# Patient Record
Sex: MALE | Race: WHITE | NOT HISPANIC OR LATINO | Employment: OTHER | URBAN - NONMETROPOLITAN AREA
[De-identification: names, ages, dates, MRNs, and addresses within clinical notes are randomized per-mention and may not be internally consistent; named-entity substitution may affect disease eponyms.]

---

## 2017-01-09 RX ORDER — PRAVASTATIN SODIUM 40 MG
TABLET ORAL
Qty: 90 TABLET | Refills: 2 | Status: SHIPPED | OUTPATIENT
Start: 2017-01-09 | End: 2017-08-07 | Stop reason: SDUPTHER

## 2017-04-17 RX ORDER — LEVOTHYROXINE SODIUM 0.07 MG/1
TABLET ORAL
Qty: 90 TABLET | Refills: 3 | Status: SHIPPED | OUTPATIENT
Start: 2017-04-17 | End: 2018-02-09 | Stop reason: SDUPTHER

## 2017-04-17 RX ORDER — OMEPRAZOLE 20 MG/1
CAPSULE, DELAYED RELEASE ORAL
Qty: 90 CAPSULE | Refills: 2 | Status: SHIPPED | OUTPATIENT
Start: 2017-04-17 | End: 2018-02-09 | Stop reason: SDUPTHER

## 2017-04-26 DIAGNOSIS — E03.9 ACQUIRED HYPOTHYROIDISM: ICD-10-CM

## 2017-04-26 DIAGNOSIS — I10 ESSENTIAL HYPERTENSION: ICD-10-CM

## 2017-04-26 DIAGNOSIS — E11.8 TYPE 2 DIABETES MELLITUS WITH COMPLICATION, WITHOUT LONG-TERM CURRENT USE OF INSULIN (HCC): ICD-10-CM

## 2017-04-26 DIAGNOSIS — I48.20 CHRONIC ATRIAL FIBRILLATION (HCC): Primary | ICD-10-CM

## 2017-04-26 DIAGNOSIS — E78.2 MIXED HYPERLIPIDEMIA: ICD-10-CM

## 2017-06-06 ENCOUNTER — LAB (OUTPATIENT)
Dept: LAB | Facility: OTHER | Age: 77
End: 2017-06-06

## 2017-06-06 ENCOUNTER — OFFICE VISIT (OUTPATIENT)
Dept: FAMILY MEDICINE CLINIC | Facility: CLINIC | Age: 77
End: 2017-06-06

## 2017-06-06 VITALS
HEART RATE: 70 BPM | SYSTOLIC BLOOD PRESSURE: 105 MMHG | WEIGHT: 177.3 LBS | BODY MASS INDEX: 26.26 KG/M2 | DIASTOLIC BLOOD PRESSURE: 65 MMHG | OXYGEN SATURATION: 98 % | HEIGHT: 69 IN

## 2017-06-06 DIAGNOSIS — I48.20 CHRONIC ATRIAL FIBRILLATION (HCC): Chronic | ICD-10-CM

## 2017-06-06 DIAGNOSIS — E03.9 ACQUIRED HYPOTHYROIDISM: ICD-10-CM

## 2017-06-06 DIAGNOSIS — I10 ESSENTIAL HYPERTENSION: Chronic | ICD-10-CM

## 2017-06-06 DIAGNOSIS — E11.8 TYPE 2 DIABETES MELLITUS WITH COMPLICATION, WITHOUT LONG-TERM CURRENT USE OF INSULIN (HCC): Primary | Chronic | ICD-10-CM

## 2017-06-06 DIAGNOSIS — E78.2 MIXED HYPERLIPIDEMIA: Chronic | ICD-10-CM

## 2017-06-06 DIAGNOSIS — I48.20 CHRONIC ATRIAL FIBRILLATION (HCC): ICD-10-CM

## 2017-06-06 DIAGNOSIS — I10 ESSENTIAL HYPERTENSION: ICD-10-CM

## 2017-06-06 DIAGNOSIS — E11.8 TYPE 2 DIABETES MELLITUS WITH COMPLICATION, WITHOUT LONG-TERM CURRENT USE OF INSULIN (HCC): ICD-10-CM

## 2017-06-06 DIAGNOSIS — N28.9 RENAL INSUFFICIENCY: ICD-10-CM

## 2017-06-06 DIAGNOSIS — E78.2 MIXED HYPERLIPIDEMIA: ICD-10-CM

## 2017-06-06 DIAGNOSIS — T46.0X1A DIGOXIN TOXICITY, ACCIDENTAL OR UNINTENTIONAL, INITIAL ENCOUNTER: ICD-10-CM

## 2017-06-06 LAB
ALBUMIN SERPL-MCNC: 3.8 G/DL (ref 3.2–5.5)
ALBUMIN/GLOB SERPL: 0.8 G/DL (ref 1–3)
ALP SERPL-CCNC: 66 U/L (ref 15–121)
ALT SERPL W P-5'-P-CCNC: 65 U/L (ref 10–60)
ANION GAP SERPL CALCULATED.3IONS-SCNC: 11 MMOL/L (ref 5–15)
ARTICHOKE IGE QN: 87 MG/DL (ref 0–129)
AST SERPL-CCNC: 71 U/L (ref 10–60)
BASOPHILS # BLD AUTO: 0.01 10*3/MM3 (ref 0–0.2)
BASOPHILS NFR BLD AUTO: 0.1 % (ref 0–2)
BILIRUB SERPL-MCNC: 0.5 MG/DL (ref 0.2–1)
BILIRUB UR QL STRIP: NEGATIVE
BUN BLD-MCNC: 51 MG/DL (ref 8–25)
BUN/CREAT SERPL: 25.5 (ref 7–25)
CALCIUM SPEC-SCNC: 8.9 MG/DL (ref 8.4–10.8)
CHLORIDE SERPL-SCNC: 101 MMOL/L (ref 100–112)
CLARITY UR: CLEAR
CO2 SERPL-SCNC: 28 MMOL/L (ref 20–32)
COLOR UR: YELLOW
CREAT BLD-MCNC: 2 MG/DL (ref 0.4–1.3)
DEPRECATED RDW RBC AUTO: 51.4 FL (ref 35.1–43.9)
EOSINOPHIL # BLD AUTO: 0.2 10*3/MM3 (ref 0–0.7)
EOSINOPHIL NFR BLD AUTO: 3 % (ref 0–7)
ERYTHROCYTE [DISTWIDTH] IN BLOOD BY AUTOMATED COUNT: 16.5 % (ref 11.5–14.5)
GFR SERPL CREATININE-BSD FRML MDRD: 33 ML/MIN/1.73 (ref 42–98)
GLOBULIN UR ELPH-MCNC: 4.7 GM/DL (ref 2.5–4.6)
GLUCOSE BLD-MCNC: 177 MG/DL (ref 70–100)
GLUCOSE UR STRIP-MCNC: NEGATIVE MG/DL
HCT VFR BLD AUTO: 40 % (ref 39–49)
HGB BLD-MCNC: 12.2 G/DL (ref 13.7–17.3)
HGB UR QL STRIP.AUTO: NEGATIVE
KETONES UR QL STRIP: ABNORMAL
LEUKOCYTE ESTERASE UR QL STRIP.AUTO: NEGATIVE
LYMPHOCYTES # BLD AUTO: 1.33 10*3/MM3 (ref 0.6–4.2)
LYMPHOCYTES NFR BLD AUTO: 19.9 % (ref 10–50)
MCH RBC QN AUTO: 26.2 PG (ref 26.5–34)
MCHC RBC AUTO-ENTMCNC: 30.5 G/DL (ref 31.5–36.3)
MCV RBC AUTO: 85.8 FL (ref 80–98)
MONOCYTES # BLD AUTO: 0.67 10*3/MM3 (ref 0–0.9)
MONOCYTES NFR BLD AUTO: 10 % (ref 0–12)
NEUTROPHILS # BLD AUTO: 4.48 10*3/MM3 (ref 2–8.6)
NEUTROPHILS NFR BLD AUTO: 67 % (ref 37–80)
NITRITE UR QL STRIP: NEGATIVE
PH UR STRIP.AUTO: 5.5 [PH] (ref 5.5–8)
PLATELET # BLD AUTO: 167 10*3/MM3 (ref 150–450)
PMV BLD AUTO: 11 FL (ref 8–12)
POTASSIUM BLD-SCNC: 5.2 MMOL/L (ref 3.4–5.4)
PROT SERPL-MCNC: 8.5 G/DL (ref 6.7–8.2)
PROT UR QL STRIP: ABNORMAL
RBC # BLD AUTO: 4.66 10*6/MM3 (ref 4.37–5.74)
SODIUM BLD-SCNC: 140 MMOL/L (ref 134–146)
SP GR UR STRIP: 1.01 (ref 1–1.03)
UROBILINOGEN UR QL STRIP: ABNORMAL
WBC NRBC COR # BLD: 6.69 10*3/MM3 (ref 3.2–9.8)

## 2017-06-06 PROCEDURE — 81003 URINALYSIS AUTO W/O SCOPE: CPT | Performed by: GENERAL PRACTICE

## 2017-06-06 PROCEDURE — 80053 COMPREHEN METABOLIC PANEL: CPT | Performed by: GENERAL PRACTICE

## 2017-06-06 PROCEDURE — 36415 COLL VENOUS BLD VENIPUNCTURE: CPT | Performed by: GENERAL PRACTICE

## 2017-06-06 PROCEDURE — 84443 ASSAY THYROID STIM HORMONE: CPT | Performed by: GENERAL PRACTICE

## 2017-06-06 PROCEDURE — 83036 HEMOGLOBIN GLYCOSYLATED A1C: CPT | Performed by: GENERAL PRACTICE

## 2017-06-06 PROCEDURE — 82043 UR ALBUMIN QUANTITATIVE: CPT | Performed by: GENERAL PRACTICE

## 2017-06-06 PROCEDURE — 85025 COMPLETE CBC W/AUTO DIFF WBC: CPT | Performed by: GENERAL PRACTICE

## 2017-06-06 PROCEDURE — 84439 ASSAY OF FREE THYROXINE: CPT | Performed by: GENERAL PRACTICE

## 2017-06-06 PROCEDURE — 80162 ASSAY OF DIGOXIN TOTAL: CPT | Performed by: GENERAL PRACTICE

## 2017-06-06 PROCEDURE — 83721 ASSAY OF BLOOD LIPOPROTEIN: CPT | Performed by: GENERAL PRACTICE

## 2017-06-06 PROCEDURE — 99214 OFFICE O/P EST MOD 30 MIN: CPT | Performed by: GENERAL PRACTICE

## 2017-06-06 RX ORDER — UBIDECARENONE 75 MG
50 CAPSULE ORAL DAILY
COMMUNITY

## 2017-06-06 RX ORDER — FUROSEMIDE 40 MG/1
40 TABLET ORAL 2 TIMES DAILY
COMMUNITY
End: 2018-02-12 | Stop reason: SDUPTHER

## 2017-06-06 NOTE — PROGRESS NOTES
Subjective   Laci Pruett is a 77 y.o. male.   Chief Complaint   Patient presents with   • Follow-up     wanting to reduce some of his meds if he can   • Hypertension   • Hyperlipidemia   • Diabetes     wanting diabetic shoes     For review and evaluation of management of chronic medical problems. Labs reviewed. Recent hospitalization for dehydration and renal disease.   Hypertension   This is a chronic problem. The current episode started more than 1 year ago. The problem has been gradually improving since onset. The problem is controlled. Associated symptoms include anxiety. Pertinent negatives include no chest pain, headaches, neck pain, palpitations or shortness of breath. There are no associated agents to hypertension. Risk factors for coronary artery disease include diabetes mellitus, dyslipidemia, male gender and stress. Past treatments include calcium channel blockers and ACE inhibitors. The current treatment provides significant improvement. There are no compliance problems.  Hypertensive end-organ damage includes heart failure. Identifiable causes of hypertension include chronic renal disease.   Hyperlipidemia   This is a chronic problem. The current episode started more than 1 year ago. The problem is controlled. Recent lipid tests were reviewed and are normal. Exacerbating diseases include chronic renal disease, diabetes and hypothyroidism. There are no known factors aggravating his hyperlipidemia. Pertinent negatives include no chest pain, myalgias or shortness of breath. Current antihyperlipidemic treatment includes statins. The current treatment provides significant improvement of lipids. There are no compliance problems.    Diabetes   He presents for his follow-up diabetic visit. He has type 2 diabetes mellitus. His disease course has been stable. Pertinent negatives for hypoglycemia include no dizziness, headaches or nervousness/anxiousness. There are no diabetic associated symptoms. Pertinent  negatives for diabetes include no chest pain, no fatigue and no weakness. There are no hypoglycemic complications. Symptoms are stable. Diabetic complications include nephropathy. Risk factors for coronary artery disease include diabetes mellitus, dyslipidemia and hypertension. Current diabetic treatment includes oral agent (monotherapy). He is compliant with treatment most of the time. His weight is stable. He is following a generally healthy diet. When asked about meal planning, he reported none. He has not had a previous visit with a dietitian. He never participates in exercise. Home blood sugar record trend: does not check regularly. An ACE inhibitor/angiotensin II receptor blocker is being taken. Eye exam is current (Dr. Anderson).      The following portions of the patient's history were reviewed and updated as appropriate: allergies, current medications, past social history and problem list.    Current Outpatient Prescriptions:   •  ACCU-CHEK FASTCLIX LANCETS misc, , Disp: , Rfl:   •  ACCU-CHEK SMARTVIEW test strip, , Disp: , Rfl:   •  Alcohol Swabs (B-D SINGLE USE SWABS REGULAR) pads, USE AS DIRECTED ONE TIME DAILY FOR INSULIN INJECTIONS AND HOME GLUCOSE TESTING , Disp: 100 each, Rfl: 2  •  amiodarone (PACERONE) 200 MG tablet, Take 200 mg by mouth daily., Disp: , Rfl:   •  aspirin 81 MG tablet, Take 81 mg by mouth daily., Disp: , Rfl:   •  Blood Glucose Calibration (ACCU-CHEK SMARTVIEW CONTROL) liquid, USE  FOR  TESTING  GLUCOMETER, Disp: 1 each, Rfl: 3  •  digoxin (LANOXIN) 250 MCG tablet, 0.5 tablet(s) by mouth daily, Disp: , Rfl:   •  furosemide (LASIX) 40 MG tablet, Take 40 mg by mouth 2 (Two) Times a Day., Disp: , Rfl:   •  ibuprofen (ADVIL,MOTRIN) 200 MG tablet, Take 200 mg by mouth as needed., Disp: , Rfl:   •  isosorbide mononitrate (ISMO,MONOKET) 20 MG tablet, TAKE 1 TABLET TWICE DAILY  7  HOURS  APART, Disp: 180 tablet, Rfl: 2  •  levothyroxine (SYNTHROID, LEVOTHROID) 75 MCG tablet, TAKE 1 TABLET  EVERY DAY, Disp: 90 tablet, Rfl: 3  •  lisinopril (PRINIVIL,ZESTRIL) 5 MG tablet, Take 5 mg by mouth daily., Disp: , Rfl:   •  nitroglycerin (NITROSTAT) 0.4 MG SL tablet, Place 0.4 mg under the tongue as needed for chest pain. Take no more than 3 doses in 15 minutes., Disp: , Rfl:   •  omeprazole (priLOSEC) 20 MG capsule, TAKE 1 CAPSULE EVERY DAY FOR STOMACH, Disp: 90 capsule, Rfl: 2  •  pravastatin (PRAVACHOL) 40 MG tablet, TAKE 1 TABLET AT BEDTIME, Disp: 90 tablet, Rfl: 2  •  sacubitril-valsartan (ENTRESTO) 24-26 MG tablet, Take 1 tablet by mouth 2 (Two) Times a Day., Disp: , Rfl:   •  sertraline (ZOLOFT) 50 MG tablet, Take  by mouth daily., Disp: , Rfl:   •  spironolactone (ALDACTONE) 25 MG tablet, Take 25 mg by mouth daily., Disp: , Rfl:   •  traZODone (DESYREL) 50 MG tablet, Take 1 tablet by mouth Every Night., Disp: 30 tablet, Rfl: 5  •  vitamin B-12 (CYANOCOBALAMIN) 100 MCG tablet, Take 50 mcg by mouth Daily., Disp: , Rfl:   •  warfarin (COUMADIN) 3 MG tablet, Take 3 mg by mouth daily., Disp: , Rfl:   •  BESIVANCE 0.6 % suspension ophthalmic suspension, , Disp: , Rfl:   •  carvedilol (COREG) 12.5 MG tablet, Take 1 tablet by mouth 2 (Two) Times a Day With Meals., Disp: 30 tablet, Rfl: 0  •  DUREZOL 0.05 % ophthalmic emulsion, , Disp: , Rfl:   •  glimepiride (AMARYL) 4 MG tablet, Take 1 tablet by mouth Every Morning Before Breakfast. For diabetes, Disp: 90 tablet, Rfl: 3    Review of Systems   Constitutional: Negative.  Negative for activity change, appetite change, chills, fatigue, fever and unexpected weight change.   HENT: Negative.  Negative for congestion, ear pain, hearing loss, nosebleeds, rhinorrhea, sinus pressure, sneezing, sore throat, tinnitus and trouble swallowing.    Eyes: Negative.  Negative for pain, discharge, redness, itching and visual disturbance.   Respiratory: Negative.  Negative for apnea, cough, chest tightness, shortness of breath and wheezing.    Cardiovascular: Negative.  Negative  "for chest pain, palpitations and leg swelling.   Gastrointestinal: Negative.  Negative for abdominal distention, abdominal pain, constipation, diarrhea, nausea and vomiting.   Endocrine: Negative.    Genitourinary: Negative.  Negative for dysuria, frequency and urgency.   Musculoskeletal: Negative.  Negative for arthralgias, back pain, gait problem, joint swelling, myalgias, neck pain and neck stiffness.   Skin: Negative.  Negative for color change and rash.   Allergic/Immunologic: Negative.    Neurological: Negative.  Negative for dizziness, weakness, light-headedness, numbness and headaches.   Hematological: Negative.  Negative for adenopathy.   Psychiatric/Behavioral: Negative.  Negative for dysphoric mood and sleep disturbance. The patient is not nervous/anxious.      Objective   Visit Vitals   • /65 (BP Location: Left arm, Patient Position: Sitting, Cuff Size: Adult)   • Pulse 70   • Ht 69\" (175.3 cm)   • Wt 177 lb 4.8 oz (80.4 kg)   • SpO2 98%   • BMI 26.18 kg/m2     Physical Exam   Constitutional: He is oriented to person, place, and time. He appears well-developed and well-nourished. No distress.   HENT:   Head: Normocephalic.   Nose: Nose normal.   Mouth/Throat: Oropharynx is clear and moist.   Eyes: Conjunctivae and EOM are normal. Pupils are equal, round, and reactive to light. Right eye exhibits no discharge. Left eye exhibits no discharge.   Neck: No thyromegaly present.   Cardiovascular: Normal rate, regular rhythm, normal heart sounds and intact distal pulses.    No murmur heard.  Pulmonary/Chest: Effort normal and breath sounds normal.   Musculoskeletal: He exhibits no edema.    Laci had a diabetic foot exam performed today.    Vascular Status -  His exam exhibits right foot vasculature normal. His exam exhibits no right foot edema. His exam exhibits left foot vasculature normal. His exam exhibits no left foot edema.   Skin Integrity  -  His right foot skin is intact.     Laci 's left foot " skin is intact. .     Lymphadenopathy:     He has no cervical adenopathy.   Neurological: He is alert and oriented to person, place, and time.   Skin: Skin is warm and dry.   Psychiatric: He has a normal mood and affect.   Nursing note and vitals reviewed.    Assessment/Plan   Problem List Items Addressed This Visit        Cardiovascular and Mediastinum    Chronic atrial fibrillation    Relevant Medications    sacubitril-valsartan (ENTRESTO) 24-26 MG tablet    Other Relevant Orders    Digoxin level    Essential hypertension    Relevant Medications    furosemide (LASIX) 40 MG tablet    Hyperlipidemia    Relevant Orders    Lipid Panel       Endocrine    Type 2 diabetes mellitus - Primary (Chronic)    Relevant Orders    Hemoglobin A1c    Comprehensive Metabolic Panel    Acquired hypothyroidism    Relevant Orders    TSH    T4, Free      Other Visit Diagnoses     Digoxin toxicity, accidental or unintentional, initial encounter        Relevant Orders    Digoxin level    Renal insufficiency        Relevant Medications    furosemide (LASIX) 40 MG tablet    Other Relevant Orders    Ambulatory Referral to Nephrology        Results for orders placed or performed in visit on 06/06/17   MicroAlbumin, Urine, Random   Result Value Ref Range    Microalbumin, Urine 2.2 mg/L   Urinalysis With / Microscopic If Indicated   Result Value Ref Range    Color, UA Yellow Yellow, Straw    Appearance, UA Clear Clear    pH, UA 5.5 5.5 - 8.0    Specific Gravity, UA 1.015 1.005 - 1.030    Glucose, UA Negative Negative    Ketones, UA Trace (A) Negative    Bilirubin, UA Negative Negative    Blood, UA Negative Negative    Protein, UA Trace (A) Negative    Leuk Esterase, UA Negative Negative    Nitrite, UA Negative Negative    Urobilinogen, UA 1.0 E.U./dL 0.2 - 1.0 E.U./dL   Hemoglobin A1c   Result Value Ref Range    Hemoglobin A1C 8.48 (H) 4 - 5.6 %   Comprehensive Metabolic Panel   Result Value Ref Range    Glucose 177 (H) 70 - 100 mg/dL    BUN 51  (H) 8 - 25 mg/dL    Creatinine 2.00 (H) 0.40 - 1.30 mg/dL    Sodium 140 134 - 146 mmol/L    Potassium 5.2 3.4 - 5.4 mmol/L    Chloride 101 100 - 112 mmol/L    CO2 28.0 20.0 - 32.0 mmol/L    Calcium 8.9 8.4 - 10.8 mg/dL    Total Protein 8.5 (H) 6.7 - 8.2 g/dL    Albumin 3.80 3.20 - 5.50 g/dL    ALT (SGPT) 65 (H) 10 - 60 U/L    AST (SGOT) 71 (H) 10 - 60 U/L    Alkaline Phosphatase 66 15 - 121 U/L    Total Bilirubin 0.5 0.2 - 1.0 mg/dL    eGFR Non  Amer 33 (L) 42 - 98 mL/min/1.73    Globulin 4.7 (H) 2.5 - 4.6 gm/dL    A/G Ratio 0.8 (L) 1.0 - 3.0 g/dL    BUN/Creatinine Ratio 25.5 (H) 7.0 - 25.0    Anion Gap 11.0 5.0 - 15.0 mmol/L   CBC Auto Differential   Result Value Ref Range    WBC 6.69 3.20 - 9.80 10*3/mm3    RBC 4.66 4.37 - 5.74 10*6/mm3    Hemoglobin 12.2 (L) 13.7 - 17.3 g/dL    Hematocrit 40.0 39.0 - 49.0 %    MCV 85.8 80.0 - 98.0 fL    MCH 26.2 (L) 26.5 - 34.0 pg    MCHC 30.5 (L) 31.5 - 36.3 g/dL    RDW 16.5 (H) 11.5 - 14.5 %    RDW-SD 51.4 (H) 35.1 - 43.9 fl    MPV 11.0 8.0 - 12.0 fL    Platelets 167 150 - 450 10*3/mm3    Neutrophil % 67.0 37.0 - 80.0 %    Lymphocyte % 19.9 10.0 - 50.0 %    Monocyte % 10.0 0.0 - 12.0 %    Eosinophil % 3.0 0.0 - 7.0 %    Basophil % 0.1 0.0 - 2.0 %    Neutrophils, Absolute 4.48 2.00 - 8.60 10*3/mm3    Lymphocytes, Absolute 1.33 0.60 - 4.20 10*3/mm3    Monocytes, Absolute 0.67 0.00 - 0.90 10*3/mm3    Eosinophils, Absolute 0.20 0.00 - 0.70 10*3/mm3    Basophils, Absolute 0.01 0.00 - 0.20 10*3/mm3   TSH   Result Value Ref Range    TSH 6.340 (H) 0.460 - 4.680 mIU/mL   T4, Free   Result Value Ref Range    Free T4 1.48 0.78 - 2.19 ng/dL   Digoxin level   Result Value Ref Range    Digoxin 2.70 (C) 0.80 - 2.00 ng/mL   LDL Cholesterol, Direct   Result Value Ref Range    LDL Cholesterol  87 0 - 129 mg/dL      Hold digoxin for 3 days then start on 1/2 tab daily. Make sure he is taking his levothyroxine daily. Home health nurse will check on these for us.     New Medications Ordered  This Visit   Medications   • vitamin B-12 (CYANOCOBALAMIN) 100 MCG tablet     Sig: Take 50 mcg by mouth Daily.   • furosemide (LASIX) 40 MG tablet     Sig: Take 40 mg by mouth 2 (Two) Times a Day.   • sacubitril-valsartan (ENTRESTO) 24-26 MG tablet     Sig: Take 1 tablet by mouth 2 (Two) Times a Day.     Return in about 3 months (around 9/6/2017) for medicare wellness visit.

## 2017-06-07 LAB
ALBUMIN UR-MCNC: 2.2 MG/L
DIGOXIN SERPL-MCNC: 2.7 NG/ML (ref 0.8–2)
HBA1C MFR BLD: 8.48 % (ref 4–5.6)
T4 FREE SERPL-MCNC: 1.48 NG/DL (ref 0.78–2.19)
TSH SERPL DL<=0.05 MIU/L-ACNC: 6.34 MIU/ML (ref 0.46–4.68)

## 2017-06-07 RX ORDER — GLIMEPIRIDE 4 MG/1
4 TABLET ORAL
Qty: 90 TABLET | Refills: 3 | Status: SHIPPED | OUTPATIENT
Start: 2017-06-07 | End: 2018-02-12 | Stop reason: SDUPTHER

## 2017-06-09 ENCOUNTER — TELEPHONE (OUTPATIENT)
Dept: FAMILY MEDICINE CLINIC | Facility: CLINIC | Age: 77
End: 2017-06-09

## 2017-06-09 RX ORDER — CARVEDILOL 12.5 MG/1
TABLET ORAL
Qty: 180 TABLET | Refills: 2 | Status: SHIPPED | OUTPATIENT
Start: 2017-06-09 | End: 2017-06-09 | Stop reason: SDUPTHER

## 2017-06-09 RX ORDER — CARVEDILOL 12.5 MG/1
12.5 TABLET ORAL 2 TIMES DAILY WITH MEALS
Qty: 30 TABLET | Refills: 0 | Status: SHIPPED | OUTPATIENT
Start: 2017-06-09 | End: 2018-01-05 | Stop reason: SDUPTHER

## 2017-07-05 RX ORDER — SPIRONOLACTONE 25 MG/1
TABLET ORAL
Qty: 45 TABLET | Refills: 3 | Status: SHIPPED | OUTPATIENT
Start: 2017-07-05 | End: 2018-02-12 | Stop reason: SDUPTHER

## 2017-07-06 ENCOUNTER — LAB (OUTPATIENT)
Dept: LAB | Facility: OTHER | Age: 77
End: 2017-07-06

## 2017-07-06 ENCOUNTER — TRANSCRIBE ORDERS (OUTPATIENT)
Dept: LAB | Facility: OTHER | Age: 77
End: 2017-07-06

## 2017-07-06 DIAGNOSIS — I48.91 ATRIAL FIBRILLATION, UNSPECIFIED TYPE (HCC): Primary | ICD-10-CM

## 2017-07-06 DIAGNOSIS — Z79.899 DRUG THERAPY: ICD-10-CM

## 2017-07-06 DIAGNOSIS — I48.91 ATRIAL FIBRILLATION, UNSPECIFIED TYPE (HCC): ICD-10-CM

## 2017-07-06 DIAGNOSIS — N18.30 CHRONIC RENAL DISEASE, STAGE III (HCC): ICD-10-CM

## 2017-07-06 DIAGNOSIS — N18.30 CHRONIC RENAL DISEASE, STAGE III (HCC): Primary | ICD-10-CM

## 2017-07-06 LAB
ALBUMIN SERPL-MCNC: 3.5 G/DL (ref 3.2–5.5)
ANION GAP SERPL CALCULATED.3IONS-SCNC: 12 MMOL/L (ref 5–15)
BASOPHILS # BLD AUTO: 0.01 10*3/MM3 (ref 0–0.2)
BASOPHILS NFR BLD AUTO: 0.2 % (ref 0–2)
BUN BLD-MCNC: 35 MG/DL (ref 8–25)
BUN/CREAT SERPL: 18.4 (ref 7–25)
CALCIUM SPEC-SCNC: 8.6 MG/DL (ref 8.4–10.8)
CHLORIDE SERPL-SCNC: 101 MMOL/L (ref 100–112)
CO2 SERPL-SCNC: 31 MMOL/L (ref 20–32)
CREAT BLD-MCNC: 1.9 MG/DL (ref 0.4–1.3)
DEPRECATED RDW RBC AUTO: 56.7 FL (ref 35.1–43.9)
EOSINOPHIL # BLD AUTO: 0.16 10*3/MM3 (ref 0–0.7)
EOSINOPHIL NFR BLD AUTO: 3.7 % (ref 0–7)
ERYTHROCYTE [DISTWIDTH] IN BLOOD BY AUTOMATED COUNT: 18.4 % (ref 11.5–14.5)
GFR SERPL CREATININE-BSD FRML MDRD: 35 ML/MIN/1.73 (ref 42–98)
GLUCOSE BLD-MCNC: 150 MG/DL (ref 70–100)
HCT VFR BLD AUTO: 37.6 % (ref 39–49)
HGB BLD-MCNC: 11.8 G/DL (ref 13.7–17.3)
INR PPP: 1.61 (ref 0.8–1.2)
LYMPHOCYTES # BLD AUTO: 1.32 10*3/MM3 (ref 0.6–4.2)
LYMPHOCYTES NFR BLD AUTO: 30.6 % (ref 10–50)
MCH RBC QN AUTO: 27.1 PG (ref 26.5–34)
MCHC RBC AUTO-ENTMCNC: 31.4 G/DL (ref 31.5–36.3)
MCV RBC AUTO: 86.2 FL (ref 80–98)
MONOCYTES # BLD AUTO: 0.46 10*3/MM3 (ref 0–0.9)
MONOCYTES NFR BLD AUTO: 10.7 % (ref 0–12)
NEUTROPHILS # BLD AUTO: 2.36 10*3/MM3 (ref 2–8.6)
NEUTROPHILS NFR BLD AUTO: 54.8 % (ref 37–80)
PHOSPHATE SERPL-MCNC: 3.3 MG/DL (ref 2.5–4.6)
PLATELET # BLD AUTO: 105 10*3/MM3 (ref 150–450)
PMV BLD AUTO: 11.5 FL (ref 8–12)
POTASSIUM BLD-SCNC: 4.8 MMOL/L (ref 3.4–5.4)
PROTHROMBIN TIME: 19.2 SECONDS (ref 11.1–15.3)
RBC # BLD AUTO: 4.36 10*6/MM3 (ref 4.37–5.74)
SODIUM BLD-SCNC: 144 MMOL/L (ref 134–146)
WBC NRBC COR # BLD: 4.31 10*3/MM3 (ref 3.2–9.8)

## 2017-07-06 PROCEDURE — 84156 ASSAY OF PROTEIN URINE: CPT | Performed by: INTERNAL MEDICINE

## 2017-07-06 PROCEDURE — 80069 RENAL FUNCTION PANEL: CPT | Performed by: INTERNAL MEDICINE

## 2017-07-06 PROCEDURE — 85610 PROTHROMBIN TIME: CPT | Performed by: INTERNAL MEDICINE

## 2017-07-06 PROCEDURE — 36416 COLLJ CAPILLARY BLOOD SPEC: CPT | Performed by: INTERNAL MEDICINE

## 2017-07-06 PROCEDURE — 82570 ASSAY OF URINE CREATININE: CPT | Performed by: INTERNAL MEDICINE

## 2017-07-06 PROCEDURE — 80162 ASSAY OF DIGOXIN TOTAL: CPT | Performed by: INTERNAL MEDICINE

## 2017-07-06 PROCEDURE — 85025 COMPLETE CBC W/AUTO DIFF WBC: CPT | Performed by: INTERNAL MEDICINE

## 2017-07-06 PROCEDURE — 36415 COLL VENOUS BLD VENIPUNCTURE: CPT | Performed by: INTERNAL MEDICINE

## 2017-07-06 PROCEDURE — 82306 VITAMIN D 25 HYDROXY: CPT | Performed by: INTERNAL MEDICINE

## 2017-07-06 PROCEDURE — 83970 ASSAY OF PARATHORMONE: CPT | Performed by: INTERNAL MEDICINE

## 2017-07-07 LAB
25(OH)D3 SERPL-MCNC: 20.8 NG/ML (ref 30–100)
CREAT UR-MCNC: 52.4 MG/DL
DIGOXIN SERPL-MCNC: 1.8 NG/ML (ref 0.8–2)
PROT UR-MCNC: 11 MG/DL
PROT/CREAT UR: 209.9 MG/G CREA (ref 0–200)
PTH-INTACT SERPL-MCNC: 247.4 PG/ML (ref 10–65)

## 2017-07-24 ENCOUNTER — HOSPITAL ENCOUNTER (OUTPATIENT)
Dept: ULTRASOUND IMAGING | Facility: HOSPITAL | Age: 77
Discharge: HOME | End: 2017-07-24
Admitting: INTERNAL MEDICINE

## 2017-07-24 DIAGNOSIS — N18.30 CHRONIC KIDNEY DISEASE, STAGE III (MODERATE) (HCC): ICD-10-CM

## 2017-07-24 PROCEDURE — 76770 US EXAM ABDO BACK WALL COMP: CPT

## 2017-08-07 RX ORDER — PRAVASTATIN SODIUM 40 MG
TABLET ORAL
Qty: 90 TABLET | Refills: 2 | Status: SHIPPED | OUTPATIENT
Start: 2017-08-07 | End: 2018-02-12 | Stop reason: SDUPTHER

## 2017-08-08 ENCOUNTER — OFFICE VISIT (OUTPATIENT)
Dept: FAMILY MEDICINE CLINIC | Facility: CLINIC | Age: 77
End: 2017-08-08

## 2017-08-08 VITALS
DIASTOLIC BLOOD PRESSURE: 55 MMHG | WEIGHT: 181.9 LBS | OXYGEN SATURATION: 98 % | HEIGHT: 69 IN | SYSTOLIC BLOOD PRESSURE: 91 MMHG | HEART RATE: 69 BPM | BODY MASS INDEX: 26.94 KG/M2

## 2017-08-08 DIAGNOSIS — M51.36 DEGENERATIVE DISC DISEASE, LUMBAR: Primary | ICD-10-CM

## 2017-08-08 PROCEDURE — 99213 OFFICE O/P EST LOW 20 MIN: CPT | Performed by: GENERAL PRACTICE

## 2017-08-08 RX ORDER — CYANOCOBALAMIN/FOLIC AC/VIT B6 1-2.2-25MG
TABLET ORAL
COMMUNITY
Start: 2017-07-20 | End: 2018-08-29

## 2017-08-08 NOTE — PROGRESS NOTES
Subjective   Laci Pruett is a 77 y.o. male.   Chief Complaint   Patient presents with   • Follow-up   • Back Pain     having trouble walking   • Hypertension     Back Pain   This is a recurrent problem. The current episode started 1 to 4 weeks ago. The problem occurs constantly. The problem has been gradually worsening since onset. The pain is present in the lumbar spine. The pain is at a severity of 8/10. The pain is severe. The pain is worse during the day. The symptoms are aggravated by bending and standing. Stiffness is present in the morning. Pertinent negatives include no abdominal pain, chest pain, dysuria, fever, headaches, numbness or weakness.   Had an episode of passing blood clot in urine, none since.     The following portions of the patient's history were reviewed and updated as appropriate: allergies, current medications, past social history and problem list.    Outpatient Medications Prior to Visit   Medication Sig Dispense Refill   • ACCU-CHEK FASTCLIX LANCETS misc      • ACCU-CHEK SMARTVIEW test strip      • Alcohol Swabs (B-D SINGLE USE SWABS REGULAR) pads USE AS DIRECTED ONE TIME DAILY FOR INSULIN INJECTIONS AND HOME GLUCOSE TESTING  100 each 2   • amiodarone (PACERONE) 200 MG tablet Take 200 mg by mouth daily.     • aspirin 81 MG tablet Take 81 mg by mouth daily.     • BESIVANCE 0.6 % suspension ophthalmic suspension      • Blood Glucose Calibration (ACCU-CHEK SMARTVIEW CONTROL) liquid USE  FOR  TESTING  GLUCOMETER 1 each 3   • carvedilol (COREG) 12.5 MG tablet Take 1 tablet by mouth 2 (Two) Times a Day With Meals. 30 tablet 0   • digoxin (LANOXIN) 250 MCG tablet 0.5 tablet(s) by mouth daily     • DUREZOL 0.05 % ophthalmic emulsion      • furosemide (LASIX) 40 MG tablet Take 40 mg by mouth 2 (Two) Times a Day.     • glimepiride (AMARYL) 4 MG tablet Take 1 tablet by mouth Every Morning Before Breakfast. For diabetes 90 tablet 3   • ibuprofen (ADVIL,MOTRIN) 200 MG tablet Take 200 mg by mouth  as needed.     • isosorbide mononitrate (ISMO,MONOKET) 20 MG tablet TAKE 1 TABLET TWICE DAILY  7  HOURS  APART 180 tablet 2   • levothyroxine (SYNTHROID, LEVOTHROID) 75 MCG tablet TAKE 1 TABLET EVERY DAY 90 tablet 3   • lisinopril (PRINIVIL,ZESTRIL) 5 MG tablet Take 5 mg by mouth daily.     • nitroglycerin (NITROSTAT) 0.4 MG SL tablet Place 0.4 mg under the tongue as needed for chest pain. Take no more than 3 doses in 15 minutes.     • omeprazole (priLOSEC) 20 MG capsule TAKE 1 CAPSULE EVERY DAY FOR STOMACH 90 capsule 2   • pravastatin (PRAVACHOL) 40 MG tablet TAKE 1 TABLET AT BEDTIME 90 tablet 2   • sacubitril-valsartan (ENTRESTO) 24-26 MG tablet Take 1 tablet by mouth 2 (Two) Times a Day.     • sertraline (ZOLOFT) 50 MG tablet Take  by mouth daily.     • spironolactone (ALDACTONE) 25 MG tablet TAKE 1/2 TABLET EVERY DAY 45 tablet 3   • traZODone (DESYREL) 50 MG tablet Take 1 tablet by mouth Every Night. 30 tablet 5   • vitamin B-12 (CYANOCOBALAMIN) 100 MCG tablet Take 50 mcg by mouth Daily.     • warfarin (COUMADIN) 3 MG tablet Take 3 mg by mouth daily.       No facility-administered medications prior to visit.        Review of Systems   Constitutional: Negative.  Negative for chills, fatigue, fever and unexpected weight change.   HENT: Negative.  Negative for congestion, ear pain, hearing loss, nosebleeds, rhinorrhea, sneezing, sore throat and tinnitus.    Eyes: Negative.  Negative for discharge.   Respiratory: Negative.  Negative for cough, shortness of breath and wheezing.    Cardiovascular: Negative.  Negative for chest pain and palpitations.   Gastrointestinal: Negative.  Negative for abdominal pain, constipation, diarrhea, nausea and vomiting.   Endocrine: Negative.    Genitourinary: Negative.  Negative for dysuria, frequency and urgency.   Musculoskeletal: Positive for back pain. Negative for arthralgias, joint swelling, myalgias and neck pain.   Skin: Negative.  Negative for rash.   Allergic/Immunologic:  "Negative.    Neurological: Negative.  Negative for dizziness, weakness, numbness and headaches.   Hematological: Negative.  Negative for adenopathy.   Psychiatric/Behavioral: Negative.  Negative for dysphoric mood and sleep disturbance. The patient is not nervous/anxious.      Objective   Visit Vitals   • BP 91/55 (BP Location: Left arm, Patient Position: Sitting, Cuff Size: Adult)   • Pulse 69   • Ht 69\" (175.3 cm)   • Wt 181 lb 14.4 oz (82.5 kg)   • SpO2 98%   • BMI 26.86 kg/m2     Physical Exam   Constitutional: He is oriented to person, place, and time. He appears well-developed and well-nourished. No distress.   HENT:   Head: Normocephalic.   Nose: Nose normal.   Mouth/Throat: Oropharynx is clear and moist.   Eyes: Conjunctivae and EOM are normal. Pupils are equal, round, and reactive to light. Right eye exhibits no discharge. Left eye exhibits no discharge.   Neck: No thyromegaly present.   Cardiovascular: Normal rate, regular rhythm, normal heart sounds and intact distal pulses.    No murmur heard.  Pulmonary/Chest: Effort normal and breath sounds normal.   Musculoskeletal: He exhibits no edema.        Lumbar back: He exhibits decreased range of motion and tenderness.   Straight leg raise 50 degrees      Lymphadenopathy:     He has no cervical adenopathy.   Neurological: He is alert and oriented to person, place, and time.   Skin: Skin is warm and dry.   Psychiatric: He has a normal mood and affect.   Nursing note and vitals reviewed.    Assessment/Plan   Problem List Items Addressed This Visit     None      Visit Diagnoses     Degenerative disc disease, lumbar    -  Primary    Relevant Orders    XR Spine Lumbar 2 or 3 View (Completed)          Will notify regarding results.     New Medications Ordered This Visit   Medications   • TL AARON RX 2.2-25-1 MG tablet tablet     Return for Next scheduled follow up.  "

## 2017-08-11 DIAGNOSIS — M54.9 CHRONIC BILATERAL BACK PAIN, UNSPECIFIED BACK LOCATION: Primary | ICD-10-CM

## 2017-08-11 DIAGNOSIS — R29.898 WEAKNESS OF BOTH LOWER EXTREMITIES: ICD-10-CM

## 2017-08-11 DIAGNOSIS — G89.29 CHRONIC BILATERAL BACK PAIN, UNSPECIFIED BACK LOCATION: Primary | ICD-10-CM

## 2017-08-15 ENCOUNTER — TRANSCRIBE ORDERS (OUTPATIENT)
Dept: LAB | Facility: OTHER | Age: 77
End: 2017-08-15

## 2017-08-15 DIAGNOSIS — N18.30 CHRONIC KIDNEY DISEASE, STAGE III (MODERATE) (HCC): Primary | ICD-10-CM

## 2017-08-18 ENCOUNTER — LAB (OUTPATIENT)
Dept: LAB | Facility: OTHER | Age: 77
End: 2017-08-18

## 2017-08-18 DIAGNOSIS — E11.9 DIABETES MELLITUS WITHOUT COMPLICATION (HCC): Primary | ICD-10-CM

## 2017-08-18 DIAGNOSIS — N18.30 CHRONIC KIDNEY DISEASE, STAGE III (MODERATE) (HCC): ICD-10-CM

## 2017-08-18 LAB
ALBUMIN SERPL-MCNC: 3.5 G/DL (ref 3.2–5.5)
ALP SERPL-CCNC: 69 U/L (ref 15–121)
ALT SERPL W P-5'-P-CCNC: 47 U/L (ref 10–60)
AST SERPL-CCNC: 54 U/L (ref 10–60)
BILIRUB CONJ SERPL-MCNC: 0.1 MG/DL (ref 0–0.1)
BILIRUB INDIRECT SERPL-MCNC: 0.5 MG/DL
BILIRUB SERPL-MCNC: 0.6 MG/DL (ref 0.2–1)
PROT SERPL-MCNC: 8.1 G/DL (ref 6.7–8.2)

## 2017-08-18 PROCEDURE — 80076 HEPATIC FUNCTION PANEL: CPT | Performed by: INTERNAL MEDICINE

## 2017-08-18 PROCEDURE — 36415 COLL VENOUS BLD VENIPUNCTURE: CPT | Performed by: INTERNAL MEDICINE

## 2017-08-22 RX ORDER — BLOOD SUGAR DIAGNOSTIC
STRIP MISCELLANEOUS
Qty: 100 EACH | Refills: 2 | Status: SHIPPED | OUTPATIENT
Start: 2017-08-22 | End: 2017-10-05

## 2017-08-28 ENCOUNTER — TELEPHONE (OUTPATIENT)
Dept: FAMILY MEDICINE CLINIC | Facility: CLINIC | Age: 77
End: 2017-08-28

## 2017-08-28 DIAGNOSIS — M47.816 OSTEOARTHRITIS OF LUMBAR SPINE, UNSPECIFIED SPINAL OSTEOARTHRITIS COMPLICATION STATUS: ICD-10-CM

## 2017-08-28 DIAGNOSIS — M54.5 LOW BACK PAIN, UNSPECIFIED BACK PAIN LATERALITY, UNSPECIFIED CHRONICITY, WITH SCIATICA PRESENCE UNSPECIFIED: ICD-10-CM

## 2017-08-28 DIAGNOSIS — M51.36 DDD (DEGENERATIVE DISC DISEASE), LUMBAR: Primary | ICD-10-CM

## 2017-08-28 NOTE — TELEPHONE ENCOUNTER
Pr Dr. REKHA Barnett, Mr. Pruett has been called with his recent X-ray results & recommendations.  Continue his current medications and follow-up as planned or sooner if any problems.    Referral sent to PT @ [facility], He lives in [place].      ----- Message from Jade Barnett MD sent at 8/23/2017  2:05 PM CDT -----  Call and tell shows degenerative disc disease and osteoarthritis, physical therapy might help, can order if he wants

## 2017-09-07 ENCOUNTER — CONSULT (OUTPATIENT)
Dept: PHYSICAL THERAPY | Facility: CLINIC | Age: 77
End: 2017-09-07

## 2017-09-07 DIAGNOSIS — M54.5 ACUTE LOW BACK PAIN, UNSPECIFIED BACK PAIN LATERALITY, WITH SCIATICA PRESENCE UNSPECIFIED: ICD-10-CM

## 2017-09-07 DIAGNOSIS — M51.36 DDD (DEGENERATIVE DISC DISEASE), LUMBAR: Primary | ICD-10-CM

## 2017-09-07 DIAGNOSIS — M47.816 OSTEOARTHRITIS OF LUMBAR SPINE, UNSPECIFIED SPINAL OSTEOARTHRITIS COMPLICATION STATUS: ICD-10-CM

## 2017-09-07 PROCEDURE — 97162 PT EVAL MOD COMPLEX 30 MIN: CPT | Performed by: PHYSICAL THERAPIST

## 2017-09-07 NOTE — PROGRESS NOTES
Outpatient Physical Therapy Ortho Initial Evaluation       Patient Name: Laci Pruett  : 1940  MRN: [id number]  Today's Date: 2017      Visit Date: 2017  Visit   Re-cert date: 17  Return to MD: ALVIN  PRECAUTION: PACEMAKER/DEFIBRILLATOR-no electrical stimulation    TIME IN 1108    TIME OUT 1157    Patient Active Problem List   Diagnosis   • Acquired hypothyroidism   • Chronic atrial fibrillation   • Degeneration of intervertebral disc between fourth and fifth lumbar vertebrae   • Essential hypertension   • Hyperlipidemia   • Type 2 diabetes mellitus        Past Medical History:   Diagnosis Date   • Acquired hypothyroidism    • Chronic atrial fibrillation    • Degeneration of intervertebral disc between fourth and fifth lumbar vertebrae    • Essential hypertension    • Hyperlipidemia    • Need for prophylactic vaccination against Streptococcus pneumoniae (pneumococcus)    • Type 2 diabetes mellitus     Type 2 diabetes mellitus - no retinopathy           Past Surgical History:   Procedure Laterality Date   • CARDIAC CATHETERIZATION  2016    Coronary arteries are essentially normal.Cardiomyopathy. EF of 25%.RV pacing noted.Moderate to severe 3+ mitral regurg noted.Hemodynamic data as mentioned.Moderate pulmonary hypertension with CHF.   • OTHER SURGICAL HISTORY  2008    Colonoscopy remove polyps ,diverticula, roids   • OTHER SURGICAL HISTORY  03/15/2012    I&D, Simple    • OTHER SURGICAL HISTORY  2008    Vision screen ,DIABETIC EYE EXAM   • OTHER SURGICAL HISTORY      Pacemaker AICD pocket        Visit Dx:     ICD-10-CM ICD-9-CM   1. DDD (degenerative disc disease), lumbar M51.36 722.52   2. Osteoarthritis of lumbar spine, unspecified spinal osteoarthritis complication status M47.816 721.3   3. Acute low back pain, unspecified back pain laterality, with sciatica presence unspecified M54.5 724.2     Outpatient Medications     ACCU-CHEK FASTCLIX LANCETS misc       ACCU-CHEK SMARTVIEW test strip      Alcohol Swabs (B-D SINGLE USE SWABS REGULAR) pads      amiodarone (PACERONE) 200 MG tablet      aspirin 81 MG tablet      BESIVANCE 0.6 % suspension ophthalmic suspension      Blood Glucose Calibration (ACCU-CHEK SMARTVIEW CONTROL) liquid      carvedilol (COREG) 12.5 MG tablet      digoxin (LANOXIN) 250 MCG tablet      DUREZOL 0.05 % ophthalmic emulsion      furosemide (LASIX) 40 MG tablet      glimepiride (AMARYL) 4 MG tablet      ibuprofen (ADVIL,MOTRIN) 200 MG tablet      isosorbide mononitrate (ISMO,MONOKET) 20 MG tablet      levothyroxine (SYNTHROID, LEVOTHROID) 75 MCG tablet      lisinopril (PRINIVIL,ZESTRIL) 5 MG tablet      nitroglycerin (NITROSTAT) 0.4 MG SL tablet      omeprazole (priLOSEC) 20 MG capsule      pravastatin (PRAVACHOL) 40 MG tablet      sacubitril-valsartan (ENTRESTO) 24-26 MG tablet      sertraline (ZOLOFT) 50 MG tablet      spironolactone (ALDACTONE) 25 MG tablet      TL AARNO RX 2.2-25-1 MG tablet tablet      traZODone (DESYREL) 50 MG tablet      vitamin B-12 (CYANOCOBALAMIN) 100 MCG tablet      warfarin (COUMADIN) 3 MG tablet      Allergies: NKA                         Strength RLE  R Hip Flexion: 4+/5  R Knee Flexion: 5/5  R Knee Extension: 4+/5  R Ankle Dorsiflexion: 5/5  R Ankle Plantar Flexion: 5/5  1st Toe Extension: 5/5              Strength LLE  L Hip Flexion: 4+/5  L Knee Flexion: 5/5  L Knee Extension: 4+/5  L Ankle Dorsiflexion: 5/5  L Ankle Plantar Flexion: 5/5  1st Toe Extension: 4+/5               Body Part  Body Part: Lumbar            Lumbar Spine Range of Motion  Lumbar Spine Flexion:  (50% mod limit)  Lumbar Spine Extension:  (50% mod limit)  Lumbar Spine Sidebend Left:  (75% min limit)  Lumbar Spine Sidebend Right:  (50% mod limit)  Lumbar Spine Rotation Left:  (75% min limit)  Lumbar Spine Rotation Right:  (50% mod limit)     Lumbar Special Tests  SLR (Neural Tension): Bilateral:, Negative                 Subjective  Evaluation    History of Present Illness  Mechanism of injury: 76 yo male with chronic LBP with intermittent B LE radiculopathy for many years. 3 years ago misstepped and felt back pain. Progressively worsening LBP since the onset, pt c/o intermittent nerve pain in B LE's radiating distally to the feet ([id number]/week). Patient reports multiple falls over the past 6 months, 5-6 falls over that time period. Patient reports h/o heart trouble and LE weakness limits tolerance to ambulation. Patient reports wanting an electric scooter for community mobility needs.    Quality of life: fair    Pain  Current pain rating: 3  At worst pain ratin  Location: central low back pain  Quality: dull ache (stabbing)  Relieving factors: heat  Aggravating factors: ambulation, stairs, squatting, standing, lifting and sleeping (bending over, rising from sitting)  Progression: worsening    Social Support  Lives in: one-story house  Lives with: alone    Diagnostic Tests  X-ray: abnormal (lumbar DDD L3-4 mild to moderate)    Treatments  Current treatment: physical therapy                    Exercises       17 1145          Exercise 1    Exercise Name 1 DKC stretch w/ towel  -BS      Exercise 2    Exercise Name 2 LTR  -BS      Exercise 3    Exercise Name 3 SKC w/ towel  -BS      Exercise 4    Exercise Name 4 gait training w/ spc  -BS      Exercise 5    Exercise Name 5 gait training with RW  -BS        User Key  (r) = Recorded By, (t) = Taken By, (c) = Cosigned By    Initials Name Provider Type    BALAJI Davis, PT Physical Therapist                  Assessment & Plan     Assessment  Impairments: abnormal gait, abnormal or restricted ROM, activity intolerance, impaired balance, impaired physical strength, lacks appropriate home exercise program and pain with function  Other impairment: light touch sensation: impaired with R distal anterior thigh and left great toe (L5), bunion with R great toe, L>R sided core trunk  instability noted with resisted L>R SLR  Assessment details: 76 yo male with chronic LBP due party to core trunk instability and lumbar DDD. Patient presents with limited lumbar spine AROM, B hip flex and R quad weakness, unsteady gait pattern and poor safety awareness (impulsive and lacks insight in functional limitations).  Prognosis: fair  Functional Limitations: walking, uncomfortable because of pain, sitting and standing  Goals  Plan Goals: STG's (in 2 weeks)  1. Pt I with HEP.  2. Improve lumbar spine AROM to min/mod limit (25-50%)-all planes.  3. Improve B hip flex and B knee ext (quads) MMT to 4+/5  4. Reduce low back pain by 25% with prolonged standing and performing ADL's  5. Able to ambulate with RW with fair+ gait mechanics x 4 minutes continuously.    LTG's (4 weeks)  1.  Improve lumbar spine AROM to min limit (25%)-all planes.  2. Improve B hip flex and R knee ext (quads) MMT to 5/5  3. Reduce low back pain by 50% with prolonged standing and performing ADL's  4. Able to ambulate with RW with good gait mechanics x 6 minutes continuously.      Plan  Therapy options: will be seen for skilled physical therapy services  Planned modality interventions: cryotherapy and thermotherapy (hydrocollator packs)  Planned therapy interventions: manual therapy, neuromuscular re-education, gait training, flexibility, balance/weight-bearing training, abdominal trunk stabilization, soft tissue mobilization, spinal/joint mobilization, stretching, strengthening, therapeutic activities, transfer training, joint mobilization and home exercise program  Frequency: 2x week  Duration in weeks: 4  Treatment plan discussed with: patient  Plan details: F/u with core trunk stability ex's and stretching lumbar paraspinals (flexed based lumbar ROM ex's).    ADDENDUM: removed electrical stimulation from original sent poc (h/o pacemaker and AICD)    Time Calculation: 49        Modified Oswestry score 28/50-56%              Lupillo Davis,  PT  9/7/2017        Laci Pruett    1940

## 2017-09-12 ENCOUNTER — TRANSCRIBE ORDERS (OUTPATIENT)
Dept: LAB | Facility: OTHER | Age: 77
End: 2017-09-12

## 2017-09-12 ENCOUNTER — LAB (OUTPATIENT)
Dept: LAB | Facility: OTHER | Age: 77
End: 2017-09-12

## 2017-09-12 DIAGNOSIS — N18.30 CHRONIC KIDNEY DISEASE, STAGE III (MODERATE) (HCC): ICD-10-CM

## 2017-09-12 DIAGNOSIS — E11.8 TYPE 2 DIABETES MELLITUS WITH COMPLICATION, WITHOUT LONG-TERM CURRENT USE OF INSULIN (HCC): Chronic | ICD-10-CM

## 2017-09-12 DIAGNOSIS — N18.30 CHRONIC RENAL DISEASE, STAGE III (HCC): ICD-10-CM

## 2017-09-12 DIAGNOSIS — N18.30 CHRONIC RENAL DISEASE, STAGE III (HCC): Primary | ICD-10-CM

## 2017-09-12 DIAGNOSIS — T46.0X1A DIGOXIN TOXICITY, ACCIDENTAL OR UNINTENTIONAL, INITIAL ENCOUNTER: ICD-10-CM

## 2017-09-12 DIAGNOSIS — E78.2 MIXED HYPERLIPIDEMIA: Chronic | ICD-10-CM

## 2017-09-12 DIAGNOSIS — E03.9 ACQUIRED HYPOTHYROIDISM: ICD-10-CM

## 2017-09-12 DIAGNOSIS — I48.20 CHRONIC ATRIAL FIBRILLATION (HCC): Chronic | ICD-10-CM

## 2017-09-12 LAB
ALBUMIN SERPL-MCNC: 3.7 G/DL (ref 3.2–5.5)
ALBUMIN/GLOB SERPL: 0.8 G/DL (ref 1–3)
ALP SERPL-CCNC: 81 U/L (ref 15–121)
ALT SERPL W P-5'-P-CCNC: 38 U/L (ref 10–60)
ANION GAP SERPL CALCULATED.3IONS-SCNC: 9 MMOL/L (ref 5–15)
AST SERPL-CCNC: 51 U/L (ref 10–60)
BILIRUB SERPL-MCNC: 0.8 MG/DL (ref 0.2–1)
BUN BLD-MCNC: 28 MG/DL (ref 8–25)
BUN/CREAT SERPL: 17.5 (ref 7–25)
CALCIUM SPEC-SCNC: 9.1 MG/DL (ref 8.4–10.8)
CHLORIDE SERPL-SCNC: 96 MMOL/L (ref 100–112)
CHOLEST SERPL-MCNC: 170 MG/DL (ref 150–200)
CO2 SERPL-SCNC: 34 MMOL/L (ref 20–32)
CREAT BLD-MCNC: 1.6 MG/DL (ref 0.4–1.3)
GFR SERPL CREATININE-BSD FRML MDRD: 42 ML/MIN/1.73 (ref 42–98)
GLOBULIN UR ELPH-MCNC: 4.7 GM/DL (ref 2.5–4.6)
GLUCOSE BLD-MCNC: 284 MG/DL (ref 70–100)
HDLC SERPL-MCNC: 39 MG/DL (ref 35–100)
LDLC SERPL CALC-MCNC: 91 MG/DL
LDLC/HDLC SERPL: 2.33 {RATIO}
PHOSPHATE SERPL-MCNC: 2.9 MG/DL (ref 2.5–4.6)
POTASSIUM BLD-SCNC: 5.2 MMOL/L (ref 3.4–5.4)
PROT SERPL-MCNC: 8.4 G/DL (ref 6.7–8.2)
SODIUM BLD-SCNC: 139 MMOL/L (ref 134–146)
TRIGL SERPL-MCNC: 201 MG/DL (ref 35–160)
VLDLC SERPL-MCNC: 40.2 MG/DL

## 2017-09-12 PROCEDURE — 84439 ASSAY OF FREE THYROXINE: CPT | Performed by: GENERAL PRACTICE

## 2017-09-12 PROCEDURE — 80061 LIPID PANEL: CPT | Performed by: GENERAL PRACTICE

## 2017-09-12 PROCEDURE — 80162 ASSAY OF DIGOXIN TOTAL: CPT | Performed by: GENERAL PRACTICE

## 2017-09-12 PROCEDURE — 80053 COMPREHEN METABOLIC PANEL: CPT | Performed by: GENERAL PRACTICE

## 2017-09-12 PROCEDURE — 84156 ASSAY OF PROTEIN URINE: CPT | Performed by: INTERNAL MEDICINE

## 2017-09-12 PROCEDURE — 83036 HEMOGLOBIN GLYCOSYLATED A1C: CPT | Performed by: GENERAL PRACTICE

## 2017-09-12 PROCEDURE — 82570 ASSAY OF URINE CREATININE: CPT | Performed by: INTERNAL MEDICINE

## 2017-09-12 PROCEDURE — 82306 VITAMIN D 25 HYDROXY: CPT | Performed by: GENERAL PRACTICE

## 2017-09-12 PROCEDURE — 36415 COLL VENOUS BLD VENIPUNCTURE: CPT | Performed by: GENERAL PRACTICE

## 2017-09-12 PROCEDURE — 84443 ASSAY THYROID STIM HORMONE: CPT | Performed by: GENERAL PRACTICE

## 2017-09-12 PROCEDURE — 84100 ASSAY OF PHOSPHORUS: CPT | Performed by: INTERNAL MEDICINE

## 2017-09-13 LAB
25(OH)D3 SERPL-MCNC: 26.4 NG/ML (ref 30–100)
CREAT UR-MCNC: 103.3 MG/DL
DIGOXIN SERPL-MCNC: 1.8 NG/ML (ref 0.8–2)
HBA1C MFR BLD: 8.1 % (ref 4–5.6)
PROT UR-MCNC: 14.1 MG/DL
PROT/CREAT UR: 136.5 MG/G CREA (ref 0–200)
T4 FREE SERPL-MCNC: 1.94 NG/DL (ref 0.78–2.19)
TSH SERPL DL<=0.05 MIU/L-ACNC: 6.41 MIU/ML (ref 0.46–4.68)

## 2017-09-19 ENCOUNTER — TREATMENT (OUTPATIENT)
Dept: PHYSICAL THERAPY | Facility: CLINIC | Age: 77
End: 2017-09-19

## 2017-09-19 DIAGNOSIS — M47.816 OSTEOARTHRITIS OF LUMBAR SPINE, UNSPECIFIED SPINAL OSTEOARTHRITIS COMPLICATION STATUS: ICD-10-CM

## 2017-09-19 DIAGNOSIS — M54.5 ACUTE LOW BACK PAIN, UNSPECIFIED BACK PAIN LATERALITY, WITH SCIATICA PRESENCE UNSPECIFIED: ICD-10-CM

## 2017-09-19 DIAGNOSIS — M51.36 DDD (DEGENERATIVE DISC DISEASE), LUMBAR: Primary | ICD-10-CM

## 2017-09-19 PROCEDURE — 97110 THERAPEUTIC EXERCISES: CPT | Performed by: PHYSICAL THERAPIST

## 2017-09-19 NOTE — PROGRESS NOTES
"Daily Treatment Note    Time In: 1322      Time Out: 1420    ICD-10-CM ICD-9-CM   1. DDD (degenerative disc disease), lumbar M51.36 722.52   2. Osteoarthritis of lumbar spine, unspecified spinal osteoarthritis complication status M47.816 721.3   3. Acute low back pain, unspecified back pain laterality, with sciatica presence unspecified M54.5 724.2       Subjective   Pt c/o LBP into hips and LE's. He is trying to do HEP. Pt reports his cane is in his car.   Patient reports to therapy 7/10 pain, and 0% improvement.  Attendance  2/3 visits. Recert 9/28. MD f/u TBCLOETTE.      Objective    Slow, cautious amb, Ind. V cues necessary for correct TE performance. Intermitt c/o pain at LB with TE. Post treatment pain 5/10.     PROCEDURES AND MODALITIES:       Moist Heat  MH Applied: Yes  Location: LB  Rx Minutes: 10 mins  MH S/P Rx: Yes       EXERCISE  Exercise 1  Exercise Name 1: Bike  Equipment/Resistance 1: L1  Time: 3.5'  Exercise 2  Exercise Name 2: LTR  Sets/Reps 2: 10x Exercise 3  Exercise Name 3: SKC  Sets/Reps 3: 10x, 2\" Exercise 4  Exercise Name 4: BKLL  Sets/Reps 4: 2/6x ea Exercise 5  Exercise Name 5: BKFO  Sets/Reps 5: 15x ea Exercise 6  Exercise Name 6: B  HS stretch  Sets/Reps 6: 2/30\"   Exercise 7  Exercise Name 7: LAQ  Sets/Reps 7: 15x Exercise 8  Exercise Name 8: Seated trunk ext iso  Sets/Reps 8: 10x Exercise 9  Exercise Name 9: Scap squeezes  Sets/Reps 9: 12x                                       Therapy Exercise [id number] 48 minutes    Total Treatment Time: 58 Minutes    Assessment/Plan   Fair piper of today's treatment. Pt advised to increase SPC use for safety. Good reaction to modality.   Progress per Plan of Care             Lito Garcia, PTA  Physical Therapist    "

## 2017-09-21 ENCOUNTER — TREATMENT (OUTPATIENT)
Dept: PHYSICAL THERAPY | Facility: CLINIC | Age: 77
End: 2017-09-21

## 2017-09-21 DIAGNOSIS — M47.816 OSTEOARTHRITIS OF LUMBAR SPINE, UNSPECIFIED SPINAL OSTEOARTHRITIS COMPLICATION STATUS: ICD-10-CM

## 2017-09-21 DIAGNOSIS — M51.36 DDD (DEGENERATIVE DISC DISEASE), LUMBAR: Primary | ICD-10-CM

## 2017-09-21 DIAGNOSIS — M54.5 ACUTE LOW BACK PAIN, UNSPECIFIED BACK PAIN LATERALITY, WITH SCIATICA PRESENCE UNSPECIFIED: ICD-10-CM

## 2017-09-21 PROCEDURE — 97110 THERAPEUTIC EXERCISES: CPT | Performed by: PHYSICAL THERAPIST

## 2017-09-21 NOTE — PROGRESS NOTES
"Daily Treatment Note    Time In: 1315      Time Out: 1410    ICD-10-CM ICD-9-CM   1. DDD (degenerative disc disease), lumbar M51.36 722.52   2. Osteoarthritis of lumbar spine, unspecified spinal osteoarthritis complication status M47.816 721.3   3. Acute low back pain, unspecified back pain laterality, with sciatica presence unspecified M54.5 724.2       Subjective   Pt c/o LBP. He felt better post prev treatment MHP.   Patient reports to therapy 4-5/10 pain, and 0% improvement.  Attendance  3/4 visits. Recert 9/28. MD f/u ALVIN.      Objective     V cues necessary for correct TE performance. Decreased LBP post treatment.        PROCEDURES AND MODALITIES:       Moist Heat  MH Applied: Yes  Location: LB  Rx Minutes: 10 mins  MH S/P Rx: Yes       EXERCISE  Exercise 1  Exercise Name 1: Bike  Equipment/Resistance 1: L1  Time: 7'  Exercise 2  Exercise Name 2: LTR  Sets/Reps 2: 10x Exercise 3  Exercise Name 3: SKC  Sets/Reps 3: 10x, 2\" Exercise 4  Exercise Name 4: BKLL  Sets/Reps 4: 2/8x ea Exercise 5  Exercise Name 5: BKFO  Sets/Reps 5: 16x Exercise 6  Exercise Name 6: Bridges  Sets/Reps 6: 2/10x   Exercise 7  Exercise Name 7: H/L alt UE/LE iso  Sets/Reps 7: 2/5x ea Exercise 8  Exercise Name 8: Seated trunk ext iso  Sets/Reps 8: 15x Exercise 9  Exercise Name 9: Scap squeezes                                     Therapy Exercise [id number] 45 minutes    Total Treatment Time: 55 Minutes    Assessment/Plan   Good tolerance of today's treatment with increased TE. Pt continues to benefit from PT for further progression towards goals.  Progress per Plan of Care             Lito Garcia, PTA  Physical Therapist    "

## 2017-09-26 ENCOUNTER — TELEPHONE (OUTPATIENT)
Dept: FAMILY MEDICINE CLINIC | Facility: CLINIC | Age: 77
End: 2017-09-26

## 2017-09-28 ENCOUNTER — TELEPHONE (OUTPATIENT)
Dept: FAMILY MEDICINE CLINIC | Facility: CLINIC | Age: 77
End: 2017-09-28

## 2017-09-28 NOTE — TELEPHONE ENCOUNTER
----- Message from Rachel Camarena sent at 9/25/2017  9:33 AM CDT -----  Mr. Pruett is asking that you call him back pTRIED   TRIED TO CALL PT ON Friday AND AGAIN TODAY BUT HAS NO VOICE MAIL AND DID NOT ANSWER

## 2017-10-05 ENCOUNTER — OFFICE VISIT (OUTPATIENT)
Dept: FAMILY MEDICINE CLINIC | Facility: CLINIC | Age: 77
End: 2017-10-05

## 2017-10-05 VITALS
SYSTOLIC BLOOD PRESSURE: 118 MMHG | BODY MASS INDEX: 27.18 KG/M2 | HEIGHT: 69 IN | WEIGHT: 183.5 LBS | DIASTOLIC BLOOD PRESSURE: 70 MMHG | OXYGEN SATURATION: 97 % | HEART RATE: 70 BPM

## 2017-10-05 DIAGNOSIS — E78.2 MIXED HYPERLIPIDEMIA: Chronic | ICD-10-CM

## 2017-10-05 DIAGNOSIS — I48.20 CHRONIC ATRIAL FIBRILLATION (HCC): Chronic | ICD-10-CM

## 2017-10-05 DIAGNOSIS — Z23 NEED FOR VACCINATION: ICD-10-CM

## 2017-10-05 DIAGNOSIS — E11.8 TYPE 2 DIABETES MELLITUS WITH COMPLICATION, WITHOUT LONG-TERM CURRENT USE OF INSULIN (HCC): Chronic | ICD-10-CM

## 2017-10-05 DIAGNOSIS — I10 ESSENTIAL HYPERTENSION: Chronic | ICD-10-CM

## 2017-10-05 DIAGNOSIS — Z00.00 MEDICARE ANNUAL WELLNESS VISIT, INITIAL: Primary | ICD-10-CM

## 2017-10-05 PROCEDURE — G0438 PPPS, INITIAL VISIT: HCPCS | Performed by: GENERAL PRACTICE

## 2017-10-05 PROCEDURE — 90662 IIV NO PRSV INCREASED AG IM: CPT | Performed by: GENERAL PRACTICE

## 2017-10-05 PROCEDURE — G0008 ADMIN INFLUENZA VIRUS VAC: HCPCS | Performed by: GENERAL PRACTICE

## 2017-10-05 PROCEDURE — 99214 OFFICE O/P EST MOD 30 MIN: CPT | Performed by: GENERAL PRACTICE

## 2017-10-05 RX ORDER — TAMSULOSIN HYDROCHLORIDE 0.4 MG/1
1 CAPSULE ORAL NIGHTLY
COMMUNITY
End: 2017-10-09 | Stop reason: SDUPTHER

## 2017-10-05 NOTE — PROGRESS NOTES
QUICK REFERENCE INFORMATION:  The ABCs of the Annual Wellness Visit    Initial Medicare Wellness Visit    HEALTH RISK ASSESSMENT    1940    Recent Hospitalizations:  Recently treated at the following:  Other: [facility].    Current Medical Providers:  Patient Care Team:  Jade Barnett MD as PCP - General  Laci Tanner MD as Consulting Physician (Urology)  Woody Campbell MD as Consulting Physician (Nephrology)    Smoking Status:  History   Smoking Status   • Never Smoker   Smokeless Tobacco   • Never Used     Alcohol Consumption:  History   Alcohol Use No     Comment: 50 yrs ago     Depression Screen:   PHQ-2/PHQ-9 Depression Screening 10/5/2017   Little interest or pleasure in doing things 0   Feeling down, depressed, or hopeless 0   Trouble falling or staying asleep, or sleeping too much 1   Feeling tired or having little energy 1   Poor appetite or overeating 0   Feeling bad about yourself - or that you are a failure or have let yourself or your family down 0   Trouble concentrating on things, such as reading the newspaper or watching television 3   Moving or speaking so slowly that other people could have noticed. Or the opposite - being so fidgety or restless that you have been moving around a lot more than usual 3   Thoughts that you would be better off dead, or of hurting yourself in some way 0   Total Score 8   If you checked off any problems, how difficult have these problems made it for you to do your work, take care of things at home, or get along with other people? Very difficult       Health Habits and Functional and Cognitive Screening:  Functional & Cognitive Status 10/5/2017   Do you have difficulty preparing food and eating? Yes   Do you have difficulty bathing yourself? Yes   Do you have difficulty getting dressed? No   Do you have difficulty using the toilet? No   Do you have difficulty moving around from place to place? Yes   In the past year have you fallen or  experienced a near fall? Yes   Do you need help using the phone?  No   Are you deaf or do you have serious difficulty hearing?  Yes   Do you need help with transportation? No   Do you need help shopping? Yes   Do you need help preparing meals?  Yes   Do you need help with housework?  Yes   Do you need help with laundry? Yes   Do you need help taking your medications? Yes   Do you need help managing money? Yes       Health Habits  Current Diet: Well Balanced Diet  Dental Exam: Not up to date  Eye Exam: Up to date  Exercise (times per week): 3 times per week  Current Exercise Activities Include: Walking      Does the patient have evidence of cognitive impairment? Yes    Asiprin use counseling: Taking ASA appropriately as indicated      Recent Lab Results:    Visual Acuity:  No exam data present    Age-appropriate Screening Schedule:  Refer to the list below for future screening recommendations based on patient's age, sex and/or medical conditions. Orders for these recommended tests are listed in the plan section. The patient has been provided with a written plan.    Health Maintenance   Topic Date Due   • HEMOGLOBIN A1C  03/12/2018   • DIABETIC FOOT EXAM  06/06/2018   • COLONOSCOPY  06/28/2018   • LIPID PANEL  09/12/2018   • URINE MICROALBUMIN  09/12/2018   • DIABETIC EYE EXAM  10/05/2018   • TDAP/TD VACCINES (2 - Td) 06/06/2027   • INFLUENZA VACCINE  Completed   • PNEUMOCOCCAL VACCINES (65+ LOW/MEDIUM RISK)  Completed   • ZOSTER VACCINE  Addressed        Subjective   History of Present Illness    Laci Pruett is a 77 y.o. male who presents for an Annual Wellness Visit.    The following portions of the patient's history were reviewed and updated as appropriate: allergies, current medications, past family history, past medical history, past social history, past surgical history and problem list.    Outpatient Medications Prior to Visit   Medication Sig Dispense Refill   • amiodarone (PACERONE) 200 MG tablet Take  200 mg by mouth Daily. Taking 1/2 tablet daily     • aspirin 81 MG tablet Take 81 mg by mouth daily.     • carvedilol (COREG) 12.5 MG tablet Take 1 tablet by mouth 2 (Two) Times a Day With Meals. 30 tablet 0   • digoxin (LANOXIN) 250 MCG tablet 0.5 tablet(s) by mouth daily     • furosemide (LASIX) 40 MG tablet Take 40 mg by mouth 2 (Two) Times a Day.     • glimepiride (AMARYL) 4 MG tablet Take 1 tablet by mouth Every Morning Before Breakfast. For diabetes 90 tablet 3   • isosorbide mononitrate (ISMO,MONOKET) 20 MG tablet TAKE 1 TABLET TWICE DAILY  7  HOURS  APART 180 tablet 2   • levothyroxine (SYNTHROID, LEVOTHROID) 75 MCG tablet TAKE 1 TABLET EVERY DAY 90 tablet 3   • nitroglycerin (NITROSTAT) 0.4 MG SL tablet Place 0.4 mg under the tongue as needed for chest pain. Take no more than 3 doses in 15 minutes.     • omeprazole (priLOSEC) 20 MG capsule TAKE 1 CAPSULE EVERY DAY FOR STOMACH 90 capsule 2   • pravastatin (PRAVACHOL) 40 MG tablet TAKE 1 TABLET AT BEDTIME 90 tablet 2   • sacubitril-valsartan (ENTRESTO) 24-26 MG tablet Take 1 tablet by mouth 2 (Two) Times a Day.     • spironolactone (ALDACTONE) 25 MG tablet TAKE 1/2 TABLET EVERY DAY 45 tablet 3   • TL AARON RX 2.2-25-1 MG tablet tablet      • traZODone (DESYREL) 50 MG tablet Take 1 tablet by mouth Every Night. 30 tablet 5   • vitamin B-12 (CYANOCOBALAMIN) 100 MCG tablet Take 50 mcg by mouth Daily.     • warfarin (COUMADIN) 3 MG tablet Take 3 mg by mouth daily.     • ACCU-CHEK FASTCLIX LANCETS misc      • ACCU-CHEK SMARTVIEW test strip TEST BLOOD SUGAR ONE TIME DAILY AS DIRECTED 100 each 2   • Alcohol Swabs (B-D SINGLE USE SWABS REGULAR) pads USE AS DIRECTED ONE TIME DAILY FOR INSULIN INJECTIONS AND HOME GLUCOSE TESTING  100 each 2   • BESIVANCE 0.6 % suspension ophthalmic suspension      • Blood Glucose Calibration (ACCU-CHEK SMARTVIEW CONTROL) liquid USE  FOR  TESTING  GLUCOMETER 1 each 3   • DUREZOL 0.05 % ophthalmic emulsion      • ibuprofen (ADVIL,MOTRIN)  200 MG tablet Take 200 mg by mouth as needed.     • lisinopril (PRINIVIL,ZESTRIL) 5 MG tablet Take 5 mg by mouth daily.     • sertraline (ZOLOFT) 50 MG tablet Take  by mouth daily.       No facility-administered medications prior to visit.        Patient Active Problem List   Diagnosis   • Acquired hypothyroidism   • Chronic atrial fibrillation   • Degeneration of intervertebral disc between fourth and fifth lumbar vertebrae   • Essential hypertension   • Hyperlipidemia   • Type 2 diabetes mellitus       Advance Care Planning:  has NO advance directive - information provided to the patient today    Identification of Risk Factors:  Risk factors include: weight , unhealthy diet, cardiovascular risk, inactivity, increased fall risk, inadequate social support, cognitive impairment and hearing limitations.    Review of Systems    Compared to one year ago, the patient feels his physical health is worse.  Compared to one year ago, the patient feels his mental health is worse.    Objective     Physical Exam   Constitutional: He is oriented to person, place, and time. He appears well-developed and well-nourished. No distress.   HENT:   Head: Normocephalic.   Nose: Nose normal.   Mouth/Throat: Oropharynx is clear and moist.   Eyes: Conjunctivae and EOM are normal. Pupils are equal, round, and reactive to light. Right eye exhibits no discharge. Left eye exhibits no discharge.   Neck: No thyromegaly present.   Cardiovascular: Normal rate, regular rhythm, normal heart sounds and intact distal pulses.    No murmur heard.  Pulmonary/Chest: Effort normal and breath sounds normal.   Musculoskeletal: He exhibits no edema.   Lymphadenopathy:     He has no cervical adenopathy.   Neurological: He is alert and oriented to person, place, and time.   Skin: Skin is warm and dry.   Psychiatric: He has a normal mood and affect.   Nursing note and vitals reviewed.      Vitals:    10/05/17 1046   BP: 118/70   Pulse: 70   SpO2: 97%   Weight: 183  "lb 8 oz (83.2 kg)   Height: 69\" (175.3 cm)   PainSc:   4   PainLoc: Leg       Body mass index is 27.1 kg/(m^2).  Discussed the patient's BMI with him. The BMI is in the acceptable range.    Assessment/Plan   Patient Self-Management and Personalized Health Advice  The patient has been provided with information about: diet, exercise, weight management, fall prevention and mental health concerns and preventive services including:   · Advance directive, Exercise counseling provided, Fall Risk assessment done, Fall Risk plan of care done, Influenza vaccine, Nutrition counseling provided.    Visit Diagnoses:    ICD-10-CM ICD-9-CM   1. Medicare annual wellness visit, initial Z00.00 V70.0   2. Need for vaccination Z23 V05.9   3. Chronic atrial fibrillation I48.2 427.31   4. Essential hypertension I10 401.9   5. Mixed hyperlipidemia E78.2 272.2   6. Type 2 diabetes mellitus with complication, without long-term current use of insulin E11.8 250.90       Orders Placed This Encounter   Procedures   • Flu Vaccine High Dose PF 65YR+       Outpatient Encounter Prescriptions as of 10/5/2017   Medication Sig Dispense Refill   • amiodarone (PACERONE) 200 MG tablet Take 200 mg by mouth Daily. Taking 1/2 tablet daily     • aspirin 81 MG tablet Take 81 mg by mouth daily.     • carvedilol (COREG) 12.5 MG tablet Take 1 tablet by mouth 2 (Two) Times a Day With Meals. 30 tablet 0   • digoxin (LANOXIN) 250 MCG tablet 0.5 tablet(s) by mouth daily     • furosemide (LASIX) 40 MG tablet Take 40 mg by mouth 2 (Two) Times a Day.     • glimepiride (AMARYL) 4 MG tablet Take 1 tablet by mouth Every Morning Before Breakfast. For diabetes 90 tablet 3   • isosorbide mononitrate (ISMO,MONOKET) 20 MG tablet TAKE 1 TABLET TWICE DAILY  7  HOURS  APART 180 tablet 2   • levothyroxine (SYNTHROID, LEVOTHROID) 75 MCG tablet TAKE 1 TABLET EVERY DAY 90 tablet 3   • nitroglycerin (NITROSTAT) 0.4 MG SL tablet Place 0.4 mg under the tongue as needed for chest pain. " Take no more than 3 doses in 15 minutes.     • omeprazole (priLOSEC) 20 MG capsule TAKE 1 CAPSULE EVERY DAY FOR STOMACH 90 capsule 2   • pravastatin (PRAVACHOL) 40 MG tablet TAKE 1 TABLET AT BEDTIME 90 tablet 2   • sacubitril-valsartan (ENTRESTO) 24-26 MG tablet Take 1 tablet by mouth 2 (Two) Times a Day.     • sacubitril-valsartan (ENTRESTO) 24-26 MG tablet Take 1 tablet by mouth Daily.     • spironolactone (ALDACTONE) 25 MG tablet TAKE 1/2 TABLET EVERY DAY 45 tablet 3   • TL AARON RX 2.2-25-1 MG tablet tablet      • traZODone (DESYREL) 50 MG tablet Take 1 tablet by mouth Every Night. 30 tablet 5   • vitamin B-12 (CYANOCOBALAMIN) 100 MCG tablet Take 50 mcg by mouth Daily.     • warfarin (COUMADIN) 3 MG tablet Take 3 mg by mouth daily.     • [DISCONTINUED] tamsulosin (FLOMAX) 0.4 MG capsule 24 hr capsule Take 1 capsule by mouth Every Night.     • [DISCONTINUED] ACCU-CHEK FASTCLIX LANCETS misc      • [DISCONTINUED] ACCU-CHEK SMARTVIEW test strip TEST BLOOD SUGAR ONE TIME DAILY AS DIRECTED 100 each 2   • [DISCONTINUED] Alcohol Swabs (B-D SINGLE USE SWABS REGULAR) pads USE AS DIRECTED ONE TIME DAILY FOR INSULIN INJECTIONS AND HOME GLUCOSE TESTING  100 each 2   • [DISCONTINUED] BESIVANCE 0.6 % suspension ophthalmic suspension      • [DISCONTINUED] Blood Glucose Calibration (ACCU-CHEK SMARTVIEW CONTROL) liquid USE  FOR  TESTING  GLUCOMETER 1 each 3   • [DISCONTINUED] DUREZOL 0.05 % ophthalmic emulsion      • [DISCONTINUED] ibuprofen (ADVIL,MOTRIN) 200 MG tablet Take 200 mg by mouth as needed.     • [DISCONTINUED] lisinopril (PRINIVIL,ZESTRIL) 5 MG tablet Take 5 mg by mouth daily.     • [DISCONTINUED] sertraline (ZOLOFT) 50 MG tablet Take  by mouth daily.       No facility-administered encounter medications on file as of 10/5/2017.        Reviewed use of high risk medication in the elderly: yes  Reviewed for potential of harmful drug interactions in the elderly: yes    Follow Up:  Return in about 3 months (around 1/5/2018)  for Recheck.     An After Visit Summary and PPPS with all of these plans were given to the patient.   Information has been scanned into chart.  Discussed importance of taking medications as prescribed. Encouraged healthy eating habits with low fat, low salt choices and working towards maintaining a healthy weight. Recommended regular exercise if able as well as care to prevent falls.

## 2017-10-05 NOTE — PATIENT INSTRUCTIONS
Call [employer] and ask if they have any services to help with taking medications regularly.     Medicare Wellness  Personal Prevention Plan of Service     Date of Office Visit:  10/05/2017  Encounter Provider:  Jade Barnett MD  Place of Service:  [facility]  Patient Name: Laci Pruett  :  1940    As part of the Medicare Wellness portion of your visit today, we are providing you with this personalized preventive plan of services (PPPS). This plan is based upon recommendations of the [place] Preventive Services Task Force (USPSTF) and the Advisory Committee on Immunization Practices (ACIP).    This lists the preventive care services that should be considered, and provides dates of when you are due. Items listed as completed are up-to-date and do not require any further intervention.    Health Maintenance   Topic Date Due   • INFLUENZA VACCINE  2017   • HEMOGLOBIN A1C  2018   • DIABETIC FOOT EXAM  2018   • COLONOSCOPY  2018   • LIPID PANEL  2018   • URINE MICROALBUMIN  2018   • MEDICARE ANNUAL WELLNESS  10/05/2018   • DIABETIC EYE EXAM  10/05/2018   • TDAP/TD VACCINES (2 - Td) 2027   • PNEUMOCOCCAL VACCINES (65+ LOW/MEDIUM RISK)  Completed   • ZOSTER VACCINE  Addressed       No orders of the defined types were placed in this encounter.      Return in about 3 months (around 2018) for Recheck.

## 2017-10-09 DIAGNOSIS — E11.9 DIABETES MELLITUS WITHOUT COMPLICATION (HCC): Primary | ICD-10-CM

## 2017-10-09 RX ORDER — PEN NEEDLE, DIABETIC 31 GX5/16"
NEEDLE, DISPOSABLE MISCELLANEOUS
Qty: 200 EACH | Refills: 4 | Status: SHIPPED | OUTPATIENT
Start: 2017-10-09 | End: 2018-02-12 | Stop reason: SDUPTHER

## 2017-10-09 RX ORDER — TAMSULOSIN HYDROCHLORIDE 0.4 MG/1
1 CAPSULE ORAL NIGHTLY
Qty: 90 CAPSULE | Refills: 2 | Status: SHIPPED | OUTPATIENT
Start: 2017-10-09 | End: 2018-02-12 | Stop reason: SDUPTHER

## 2017-10-09 RX ORDER — BLOOD-GLUCOSE METER
EACH MISCELLANEOUS
Qty: 1 KIT | Refills: 0 | Status: SHIPPED | OUTPATIENT
Start: 2017-10-09 | End: 2017-12-11 | Stop reason: SDUPTHER

## 2017-10-09 RX ORDER — BLOOD GLUCOSE CONTROL HIGH,LOW
EACH MISCELLANEOUS
Qty: 3 EACH | Refills: 4 | Status: SHIPPED | OUTPATIENT
Start: 2017-10-09 | End: 2018-02-12 | Stop reason: SDUPTHER

## 2017-10-19 NOTE — PROGRESS NOTES
Subjective   Laci Pruett is a 77 y.o. male.   Chief Complaint   Patient presents with   • Diabetes     labs medicare wellness   • Thyroid Problem     For review and evaluation of management of chronic medical problems. Labs reviewed.   Diabetes   He presents for his follow-up diabetic visit. He has type 2 diabetes mellitus. His disease course has been stable. Pertinent negatives for hypoglycemia include no dizziness, headaches or nervousness/anxiousness. There are no diabetic associated symptoms. Pertinent negatives for diabetes include no chest pain, no fatigue and no weakness. There are no hypoglycemic complications. Symptoms are stable. Diabetic complications include nephropathy. Risk factors for coronary artery disease include diabetes mellitus, dyslipidemia and hypertension. Current diabetic treatment includes oral agent (monotherapy). He is compliant with treatment most of the time. His weight is stable. He is following a generally healthy diet. When asked about meal planning, he reported none. He has not had a previous visit with a dietitian. He never participates in exercise. Home blood sugar record trend: does not check regularly. An ACE inhibitor/angiotensin II receptor blocker is being taken. Eye exam is current (Dr. Anderson).   Hypertension   This is a chronic problem. The current episode started more than 1 year ago. The problem has been gradually improving since onset. The problem is controlled. Associated symptoms include anxiety. Pertinent negatives include no chest pain, headaches, neck pain, palpitations or shortness of breath. There are no associated agents to hypertension. Risk factors for coronary artery disease include diabetes mellitus, dyslipidemia, male gender and stress. Past treatments include calcium channel blockers and ACE inhibitors. The current treatment provides significant improvement. There are no compliance problems.  Identifiable causes of hypertension include chronic renal  disease.   Hyperlipidemia   This is a chronic problem. The current episode started more than 1 year ago. The problem is controlled. Recent lipid tests were reviewed and are normal. Exacerbating diseases include chronic renal disease and hypothyroidism. There are no known factors aggravating his hyperlipidemia. Pertinent negatives include no chest pain, myalgias or shortness of breath. Current antihyperlipidemic treatment includes statins. The current treatment provides significant improvement of lipids. There are no compliance problems.    Hypothyroidism   This is a chronic problem. The current episode started more than 1 year ago. The problem occurs constantly. The problem has been unchanged. Pertinent negatives include no abdominal pain, arthralgias, chest pain, chills, congestion, coughing, fatigue, fever, headaches, joint swelling, myalgias, nausea, neck pain, numbness, rash, sore throat, vomiting or weakness. Nothing aggravates the symptoms. Treatments tried: levothyroxine. The treatment provided moderate relief.      The following portions of the patient's history were reviewed and updated as appropriate: allergies, current medications, past social history and problem list.    Outpatient Medications Prior to Visit   Medication Sig Dispense Refill   • amiodarone (PACERONE) 200 MG tablet Take 200 mg by mouth Daily. Taking 1/2 tablet daily     • aspirin 81 MG tablet Take 81 mg by mouth daily.     • carvedilol (COREG) 12.5 MG tablet Take 1 tablet by mouth 2 (Two) Times a Day With Meals. 30 tablet 0   • digoxin (LANOXIN) 250 MCG tablet 0.5 tablet(s) by mouth daily     • furosemide (LASIX) 40 MG tablet Take 40 mg by mouth 2 (Two) Times a Day.     • glimepiride (AMARYL) 4 MG tablet Take 1 tablet by mouth Every Morning Before Breakfast. For diabetes 90 tablet 3   • isosorbide mononitrate (ISMO,MONOKET) 20 MG tablet TAKE 1 TABLET TWICE DAILY  7  HOURS  APART 180 tablet 2   • levothyroxine (SYNTHROID, LEVOTHROID) 75 MCG  tablet TAKE 1 TABLET EVERY DAY 90 tablet 3   • nitroglycerin (NITROSTAT) 0.4 MG SL tablet Place 0.4 mg under the tongue as needed for chest pain. Take no more than 3 doses in 15 minutes.     • omeprazole (priLOSEC) 20 MG capsule TAKE 1 CAPSULE EVERY DAY FOR STOMACH 90 capsule 2   • pravastatin (PRAVACHOL) 40 MG tablet TAKE 1 TABLET AT BEDTIME 90 tablet 2   • sacubitril-valsartan (ENTRESTO) 24-26 MG tablet Take 1 tablet by mouth 2 (Two) Times a Day.     • spironolactone (ALDACTONE) 25 MG tablet TAKE 1/2 TABLET EVERY DAY 45 tablet 3   • TL AARON RX 2.2-25-1 MG tablet tablet      • traZODone (DESYREL) 50 MG tablet Take 1 tablet by mouth Every Night. 30 tablet 5   • vitamin B-12 (CYANOCOBALAMIN) 100 MCG tablet Take 50 mcg by mouth Daily.     • warfarin (COUMADIN) 3 MG tablet Take 3 mg by mouth daily.     • ACCU-CHEK FASTCLIX LANCETS misc      • ACCU-CHEK SMARTVIEW test strip TEST BLOOD SUGAR ONE TIME DAILY AS DIRECTED 100 each 2   • Alcohol Swabs (B-D SINGLE USE SWABS REGULAR) pads USE AS DIRECTED ONE TIME DAILY FOR INSULIN INJECTIONS AND HOME GLUCOSE TESTING  100 each 2   • BESIVANCE 0.6 % suspension ophthalmic suspension      • Blood Glucose Calibration (ACCU-CHEK SMARTVIEW CONTROL) liquid USE  FOR  TESTING  GLUCOMETER 1 each 3   • DUREZOL 0.05 % ophthalmic emulsion      • ibuprofen (ADVIL,MOTRIN) 200 MG tablet Take 200 mg by mouth as needed.     • lisinopril (PRINIVIL,ZESTRIL) 5 MG tablet Take 5 mg by mouth daily.     • sertraline (ZOLOFT) 50 MG tablet Take  by mouth daily.       No facility-administered medications prior to visit.        Review of Systems   Constitutional: Negative.  Negative for chills, fatigue, fever and unexpected weight change.   HENT: Negative.  Negative for congestion, ear pain, hearing loss, nosebleeds, rhinorrhea, sneezing, sore throat and tinnitus.    Eyes: Negative.  Negative for discharge.   Respiratory: Negative.  Negative for cough, shortness of breath and wheezing.    Cardiovascular:  "Negative.  Negative for chest pain and palpitations.   Gastrointestinal: Negative.  Negative for abdominal pain, constipation, diarrhea, nausea and vomiting.   Endocrine: Negative.    Genitourinary: Negative.  Negative for dysuria, frequency and urgency.   Musculoskeletal: Negative.  Negative for arthralgias, back pain, joint swelling, myalgias and neck pain.   Skin: Negative.  Negative for rash.   Allergic/Immunologic: Negative.    Neurological: Negative.  Negative for dizziness, weakness, numbness and headaches.   Hematological: Negative.  Negative for adenopathy.   Psychiatric/Behavioral: Negative.  Negative for dysphoric mood and sleep disturbance. The patient is not nervous/anxious.        Objective   Visit Vitals   • /70   • Pulse 70   • Ht 69\" (175.3 cm)   • Wt 183 lb 8 oz (83.2 kg)   • SpO2 97%   • BMI 27.1 kg/m2     Physical Exam   Constitutional: He is oriented to person, place, and time. He appears well-developed and well-nourished. No distress.   HENT:   Head: Normocephalic.   Nose: Nose normal.   Mouth/Throat: Oropharynx is clear and moist.   Eyes: Conjunctivae and EOM are normal. Pupils are equal, round, and reactive to light. Right eye exhibits no discharge. Left eye exhibits no discharge.   Neck: No thyromegaly present.   Cardiovascular: Normal rate, regular rhythm, normal heart sounds and intact distal pulses.    No murmur heard.  Pulmonary/Chest: Effort normal and breath sounds normal.   Musculoskeletal: He exhibits no edema.   Lymphadenopathy:     He has no cervical adenopathy.   Neurological: He is alert and oriented to person, place, and time.   Skin: Skin is warm and dry.   Psychiatric: He has a normal mood and affect.   Nursing note and vitals reviewed.    Results for orders placed or performed in visit on 09/12/17   Lipid Panel   Result Value Ref Range    Total Cholesterol 170 150 - 200 mg/dL    Triglycerides 201 (H) 35 - 160 mg/dL    HDL Cholesterol 39 35 - 100 mg/dL    LDL Cholesterol "  91 mg/dL    VLDL Cholesterol 40.2 mg/dL    LDL/HDL Ratio 2.33    Hemoglobin A1c   Result Value Ref Range    Hemoglobin A1C 8.1 (H) 4 - 5.6 %   Comprehensive Metabolic Panel   Result Value Ref Range    Glucose 284 (H) 70 - 100 mg/dL    BUN 28 (H) 8 - 25 mg/dL    Creatinine 1.60 (H) 0.40 - 1.30 mg/dL    Sodium 139 134 - 146 mmol/L    Potassium 5.2 3.4 - 5.4 mmol/L    Chloride 96 (L) 100 - 112 mmol/L    CO2 34.0 (H) 20.0 - 32.0 mmol/L    Calcium 9.1 8.4 - 10.8 mg/dL    Total Protein 8.4 (H) 6.7 - 8.2 g/dL    Albumin 3.70 3.20 - 5.50 g/dL    ALT (SGPT) 38 10 - 60 U/L    AST (SGOT) 51 10 - 60 U/L    Alkaline Phosphatase 81 15 - 121 U/L    Total Bilirubin 0.8 0.2 - 1.0 mg/dL    eGFR Non African Amer 42 42 - 98 mL/min/1.73    Globulin 4.7 (H) 2.5 - 4.6 gm/dL    A/G Ratio 0.8 (L) 1.0 - 3.0 g/dL    BUN/Creatinine Ratio 17.5 7.0 - 25.0    Anion Gap 9.0 5.0 - 15.0 mmol/L   TSH   Result Value Ref Range    TSH 6.410 (H) 0.460 - 4.680 mIU/mL   T4, Free   Result Value Ref Range    Free T4 1.94 0.78 - 2.19 ng/dL   Digoxin level   Result Value Ref Range    Digoxin 1.80 0.80 - 2.00 ng/mL   Protein / Creatinine Ratio, Urine   Result Value Ref Range    Protein/Creatinine Ratio, Urine 136.5 0.0 - 200.0 mg/G Crea    Creatinine, Urine 103.3 mg/dL    Total Protein, Urine 14.1 mg/dL   Vitamin D 25 Hydroxy   Result Value Ref Range    25 Hydroxy, Vitamin D 26.4 (L) 30.0 - 100.0 ng/ml   Phosphorus   Result Value Ref Range    Phosphorus 2.9 2.5 - 4.6 mg/dL      Assessment/Plan   Problem List Items Addressed This Visit        Cardiovascular and Mediastinum    Chronic atrial fibrillation (Chronic)    Relevant Medications    sacubitril-valsartan (ENTRESTO) 24-26 MG tablet    Essential hypertension (Chronic)    Hyperlipidemia (Chronic)       Endocrine    Type 2 diabetes mellitus (Chronic)      Other Visit Diagnoses     Medicare annual wellness visit, initial    -  Primary    Need for vaccination        Relevant Orders    Flu Vaccine High Dose PF  65YR+ (Completed)        Needs to make sure he is taking his medications daily, family is helping with this.     New Medications Ordered This Visit   Medications   • sacubitril-valsartan (ENTRESTO) 24-26 MG tablet     Sig: Take 1 tablet by mouth Daily.     Return in about 3 months (around 1/5/2018) for Recheck.

## 2017-12-07 ENCOUNTER — TELEPHONE (OUTPATIENT)
Dept: FAMILY MEDICINE CLINIC | Facility: CLINIC | Age: 77
End: 2017-12-07

## 2017-12-07 DIAGNOSIS — M25.552 PAIN OF LEFT HIP JOINT: Primary | ICD-10-CM

## 2017-12-11 ENCOUNTER — TRANSCRIBE ORDERS (OUTPATIENT)
Dept: LAB | Facility: HOSPITAL | Age: 77
End: 2017-12-11

## 2017-12-11 DIAGNOSIS — N18.30 CHRONIC KIDNEY DISEASE, STAGE III (MODERATE) (HCC): Primary | ICD-10-CM

## 2017-12-11 DIAGNOSIS — E11.9 DIABETES MELLITUS WITHOUT COMPLICATION (HCC): ICD-10-CM

## 2017-12-12 DIAGNOSIS — M25.552 LEFT HIP PAIN: Primary | ICD-10-CM

## 2017-12-12 RX ORDER — BLOOD-GLUCOSE METER
EACH MISCELLANEOUS
Qty: 1 KIT | Refills: 0 | Status: SHIPPED | OUTPATIENT
Start: 2017-12-12 | End: 2018-02-12 | Stop reason: SDUPTHER

## 2017-12-12 RX ORDER — LANCETS
EACH MISCELLANEOUS
Qty: 100 EACH | Refills: 4 | Status: SHIPPED | OUTPATIENT
Start: 2017-12-12 | End: 2018-02-12 | Stop reason: SDUPTHER

## 2017-12-13 ENCOUNTER — OFFICE VISIT (OUTPATIENT)
Dept: ORTHOPEDIC SURGERY | Facility: CLINIC | Age: 77
End: 2017-12-13

## 2017-12-13 VITALS — WEIGHT: 184 LBS | HEIGHT: 69 IN | BODY MASS INDEX: 27.25 KG/M2

## 2017-12-13 DIAGNOSIS — M54.50 LUMBAR SPINE PAIN: ICD-10-CM

## 2017-12-13 DIAGNOSIS — M25.552 LEFT HIP PAIN: Primary | ICD-10-CM

## 2017-12-13 PROCEDURE — 99214 OFFICE O/P EST MOD 30 MIN: CPT | Performed by: NURSE PRACTITIONER

## 2017-12-13 PROCEDURE — 96372 THER/PROPH/DIAG INJ SC/IM: CPT | Performed by: NURSE PRACTITIONER

## 2017-12-13 RX ORDER — HYDROCODONE BITARTRATE AND ACETAMINOPHEN 5; 325 MG/1; MG/1
1 TABLET ORAL EVERY 8 HOURS PRN
Qty: 40 TABLET | Refills: 0 | Status: SHIPPED | OUTPATIENT
Start: 2017-12-13 | End: 2017-12-23

## 2017-12-13 NOTE — PROGRESS NOTES
Laci Pruett is a 77 y.o. male   Primary provider:  Jade Barnett MD       Chief Complaint   Patient presents with   • Left Hip - Establish Care     Xray done today in office         HISTORY OF PRESENT ILLNESS:    Hip Pain    Incident onset: 2 weeks. There was no injury mechanism. The pain is present in the left hip and left leg. The quality of the pain is described as aching. The pain is at a severity of 9/10. The pain is severe. The pain has been intermittent since onset. Associated symptoms include a loss of motion. He reports no foreign bodies present. Exacerbated by: lying down. He has tried nothing for the symptoms.        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 Taking 1/2 tablet daily, Disp: , Rfl:   •  aspirin 81 MG tablet, Take 81 mg by mouth daily., Disp: , Rfl:   •  carvedilol (COREG) 12.5 MG tablet, Take 1 tablet by mouth 2 (Two) Times a Day With Meals., Disp: 30 tablet, Rfl: 0  •  digoxin (LANOXIN) 250 MCG tablet, 0.5 tablet(s) by mouth daily, Disp: , Rfl:   •  furosemide (LASIX) 40 MG tablet, Take 40 mg by mouth 2 (Two) Times a Day., Disp: , Rfl:   •  glimepiride (AMARYL) 4 MG tablet, Take 1 tablet by mouth Every Morning Before Breakfast. For diabetes, Disp: 90 tablet, Rfl: 3  •  isosorbide mononitrate (ISMO,MONOKET) 20 MG tablet, TAKE 1 TABLET TWICE DAILY  7  HOURS  APART, Disp: 180 tablet, Rfl: 2  •  levothyroxine (SYNTHROID, LEVOTHROID) 75 MCG tablet, TAKE 1 TABLET EVERY DAY,  Disp: 90 tablet, Rfl: 3  •  nitroglycerin (NITROSTAT) 0.4 MG SL tablet, Place 0.4 mg under the tongue as needed for chest pain. Take no more than 3 doses in 15 minutes., Disp: , Rfl:   •  omeprazole (priLOSEC) 20 MG capsule, TAKE 1 CAPSULE EVERY DAY FOR STOMACH, Disp: 90 capsule, Rfl: 2  •  pravastatin (PRAVACHOL) 40 MG tablet, TAKE 1 TABLET AT BEDTIME, Disp: 90 tablet, Rfl: 2  •  sacubitril-valsartan (ENTRESTO) 24-26 MG tablet, Take 1 tablet by mouth 2 (Two) Times a Day., Disp: , Rfl:   •  spironolactone (ALDACTONE) 25 MG tablet, TAKE 1/2 TABLET EVERY DAY, Disp: 45 tablet, Rfl: 3  •  tamsulosin (FLOMAX) 0.4 MG capsule 24 hr capsule, Take 1 capsule by mouth Every Night., Disp: 90 capsule, Rfl: 2  •  TL AARON RX 2.2-25-1 MG tablet tablet, , Disp: , Rfl:   •  traZODone (DESYREL) 50 MG tablet, Take 1 tablet by mouth Every Night., Disp: 30 tablet, Rfl: 5  •  vitamin B-12 (CYANOCOBALAMIN) 100 MCG tablet, Take 50 mcg by mouth Daily., Disp: , Rfl:   •  warfarin (COUMADIN) 3 MG tablet, Take 3 mg by mouth daily., Disp: , Rfl:   •  ACCU-CHEK SOFTCLIX LANCETS lancets, Test Blood Sugars Daily, Disp: 100 each, Rfl: 4  •  Alcohol Swabs (ALCOHOL PREP) pads, Use for testing Blood Sugars at home., Disp: 200 each, Rfl: 4  •  Blood Glucose Calibration (ACCU-CHEK DESMOND) solution, Use for testing his home glucometer, Disp: 3 each, Rfl: 4  •  Blood Glucose Monitoring Suppl (ACCU-CHEK DESMOND PLUS) w/Device kit, USE AS DIRECTED, Disp: 1 kit, Rfl: 0  •  glucose blood (ACCU-CHEK DESMOND PLUS) test strip, Test Blood Sugars Once Daily., Disp: 100 each, Rfl: 4  •  HYDROcodone-acetaminophen (NORCO) 5-325 MG per tablet, Take 1 tablet by mouth Every 8 (Eight) Hours As Needed (PRN) for up to 10 days., Disp: 40 tablet, Rfl: 0  No current facility-administered medications for this visit.     Past Surgical History:   Procedure Laterality Date   • CARDIAC CATHETERIZATION  01/21/2016    Coronary arteries are essentially normal.Cardiomyopathy. EF of 25%.RV  "pacing noted.Moderate to severe 3+ mitral regurg noted.Hemodynamic data as mentioned.Moderate pulmonary hypertension with CHF.   • OTHER SURGICAL HISTORY  06/28/2008    Colonoscopy remove polyps ,diverticula, roids   • OTHER SURGICAL HISTORY  03/15/2012    I&D, Simple    • OTHER SURGICAL HISTORY  06/11/2008    Vision screen ,DIABETIC EYE EXAM   • OTHER SURGICAL HISTORY      Pacemaker AICD pocket        Family History   Problem Relation Age of Onset   • Breast cancer Sister    • Arthritis Sister    • Cancer Sister    • Heart disease Sister    • Coronary artery disease Other    • Diabetes Other    • Hypertension Other    • Arthritis Mother    • Diabetes Mother    • Heart disease Mother    • Liver disease Mother    • Arthritis Father    • Diabetes Father    • Heart disease Father    • Arthritis Brother        Social History     Social History   • Marital status:      Spouse name: N/A   • Number of children: N/A   • Years of education: N/A     Occupational History   • Not on file.     Social History Main Topics   • Smoking status: Never Smoker   • Smokeless tobacco: Never Used   • Alcohol use No      Comment: 50 yrs ago   • Drug use: Not on file   • Sexual activity: Not on file     Other Topics Concern   • Not on file     Social History Narrative        Review of Systems   HENT: Positive for hearing loss.    Eyes: Positive for pain.   Respiratory: Positive for shortness of breath and wheezing.    Neurological: Positive for light-headedness.   Psychiatric/Behavioral: Positive for sleep disturbance.   All other systems reviewed and are negative.      PHYSICAL EXAMINATION:       Ht 175.3 cm (69\")  Wt 83.5 kg (184 lb)  BMI 27.17 kg/m2    Physical Exam   Constitutional: He is oriented to person, place, and time. Vital signs are normal. He appears well-developed and well-nourished. He is cooperative.   HENT:   Head: Normocephalic and atraumatic.   Neck: Trachea normal and phonation normal.   Pulmonary/Chest: Effort " normal. No respiratory distress.   Abdominal: Soft. Normal appearance. He exhibits no distension.   Neurological: He is alert and oriented to person, place, and time. GCS eye subscore is 4. GCS verbal subscore is 5. GCS motor subscore is 6.   Skin: Skin is warm, dry and intact.   Psychiatric: He has a normal mood and affect. His speech is normal and behavior is normal. Judgment and thought content normal. Cognition and memory are normal.   Vitals reviewed.      GAIT:     []  Normal  [x]  Antalgic    Assistive device: []  None  []  Walker     []  Crutches  []  Cane     []  Wheelchair  []  Stretcher    Right Hip Exam   Right hip exam is normal.       Left Hip Exam     Tenderness   The patient is experiencing tenderness in the greater trochanter.    Range of Motion   Flexion: normal   Internal Rotation: normal   External Rotation: normal   Abduction: normal     Muscle Strength   Abduction: 4/5   Adduction: 4/5   Flexion: 4/5     Other   Erythema: absent  Scars: absent  Sensation: normal  Pulse: present      Back Exam     Tenderness   The patient is experiencing tenderness in the lumbar and sacroiliac.    Range of Motion   Extension: abnormal   Flexion: abnormal   Lateral Bend Right: abnormal   Lateral Bend Left: abnormal   Rotation Right: abnormal   Rotation Left: abnormal     Muscle Strength   Right Quadriceps:  4/5   Left Quadriceps:  4/5   Right Hamstrings:  4/5   Left Hamstrings:  4/5     Tests   Straight leg raise right: negative  Straight leg raise left: positive    Reflexes   Patellar: abnormal  Achilles: abnormal    Other   Toe Walk: abnormal  Heel Walk: abnormal  Sensation: normal  Gait: antalgic   Erythema: no back redness  Scars: absent                  Xr Hip With Or Without Pelvis 2 - 3 View Left    Result Date: 12/13/2017  Narrative: AP of the pelvis with 2 views of the left hip reveals mild degenerative changes without any acute radiological abnormality noted. 12/13/17 at 1:18 PM by Gallo Waters,  APRN           ASSESSMENT:    Diagnoses and all orders for this visit:    Left hip pain  -     triamcinolone acetonide (KENALOG-40) injection 80 mg; Inject 2 mL into the shoulder, thigh, or buttocks 1 (One) Time.    Lumbar spine pain  -     triamcinolone acetonide (KENALOG-40) injection 80 mg; Inject 2 mL into the shoulder, thigh, or buttocks 1 (One) Time.    Other orders  -     HYDROcodone-acetaminophen (NORCO) 5-325 MG per tablet; Take 1 tablet by mouth Every 8 (Eight) Hours As Needed (PRN) for up to 10 days.          PLAN  Suspect radicular pain in the left leg recommend and I am injection of steroids and follow-up in 2 weeks for recheck if the pain continues then we will proceed with an MRI of the lumbar spine.  Patient will need lumbar spine films since he has not had that was.  No Follow-up on file.    Gallo Waters, APRN

## 2017-12-15 RX ORDER — TRIAMCINOLONE ACETONIDE 40 MG/ML
80 INJECTION, SUSPENSION INTRA-ARTICULAR; INTRAMUSCULAR ONCE
Status: COMPLETED | OUTPATIENT
Start: 2017-12-15 | End: 2017-12-15

## 2017-12-15 RX ADMIN — TRIAMCINOLONE ACETONIDE 80 MG: 40 INJECTION, SUSPENSION INTRA-ARTICULAR; INTRAMUSCULAR at 09:35

## 2018-01-04 ENCOUNTER — TELEPHONE (OUTPATIENT)
Dept: FAMILY MEDICINE CLINIC | Facility: CLINIC | Age: 78
End: 2018-01-04

## 2018-01-04 DIAGNOSIS — I48.20 CHRONIC ATRIAL FIBRILLATION (HCC): Primary | Chronic | ICD-10-CM

## 2018-01-05 ENCOUNTER — OFFICE VISIT (OUTPATIENT)
Dept: FAMILY MEDICINE CLINIC | Facility: CLINIC | Age: 78
End: 2018-01-05

## 2018-01-05 ENCOUNTER — LAB (OUTPATIENT)
Dept: LAB | Facility: HOSPITAL | Age: 78
End: 2018-01-05

## 2018-01-05 VITALS
OXYGEN SATURATION: 98 % | WEIGHT: 177.2 LBS | BODY MASS INDEX: 27.81 KG/M2 | SYSTOLIC BLOOD PRESSURE: 92 MMHG | HEIGHT: 67 IN | HEART RATE: 71 BPM | DIASTOLIC BLOOD PRESSURE: 60 MMHG

## 2018-01-05 DIAGNOSIS — E11.8 TYPE 2 DIABETES MELLITUS WITH COMPLICATION, WITHOUT LONG-TERM CURRENT USE OF INSULIN (HCC): ICD-10-CM

## 2018-01-05 DIAGNOSIS — E03.9 ACQUIRED HYPOTHYROIDISM: ICD-10-CM

## 2018-01-05 DIAGNOSIS — I48.20 CHRONIC ATRIAL FIBRILLATION (HCC): Primary | ICD-10-CM

## 2018-01-05 DIAGNOSIS — I48.20 CHRONIC ATRIAL FIBRILLATION (HCC): Chronic | ICD-10-CM

## 2018-01-05 DIAGNOSIS — E78.2 MIXED HYPERLIPIDEMIA: Primary | Chronic | ICD-10-CM

## 2018-01-05 LAB
ALBUMIN SERPL-MCNC: 4.2 G/DL (ref 3.4–4.8)
ALBUMIN/GLOB SERPL: 1 G/DL (ref 1.1–1.8)
ALP SERPL-CCNC: 72 U/L (ref 38–126)
ALT SERPL W P-5'-P-CCNC: 52 U/L (ref 21–72)
ANION GAP SERPL CALCULATED.3IONS-SCNC: 10 MMOL/L (ref 5–15)
ARTICHOKE IGE QN: 96 MG/DL (ref 1–129)
AST SERPL-CCNC: 65 U/L (ref 17–59)
BASOPHILS # BLD AUTO: 0.01 10*3/MM3 (ref 0–0.2)
BASOPHILS NFR BLD AUTO: 0.1 % (ref 0–2)
BILIRUB SERPL-MCNC: 0.6 MG/DL (ref 0.2–1.3)
BUN BLD-MCNC: 63 MG/DL (ref 7–21)
BUN/CREAT SERPL: 21.7 (ref 7–25)
CALCIUM SPEC-SCNC: 8.9 MG/DL (ref 8.4–10.2)
CHLORIDE SERPL-SCNC: 98 MMOL/L (ref 95–110)
CO2 SERPL-SCNC: 30 MMOL/L (ref 22–31)
CREAT BLD-MCNC: 2.9 MG/DL (ref 0.7–1.3)
DEPRECATED RDW RBC AUTO: 53 FL (ref 35.1–43.9)
DIGOXIN SERPL-MCNC: 1.9 NG/ML (ref 0.8–2)
EOSINOPHIL # BLD AUTO: 0.12 10*3/MM3 (ref 0–0.7)
EOSINOPHIL NFR BLD AUTO: 1.3 % (ref 0–7)
ERYTHROCYTE [DISTWIDTH] IN BLOOD BY AUTOMATED COUNT: 16.6 % (ref 11.5–14.5)
GFR SERPL CREATININE-BSD FRML MDRD: 21 ML/MIN/1.73 (ref 60–98)
GLOBULIN UR ELPH-MCNC: 4.1 GM/DL (ref 2.3–3.5)
GLUCOSE BLD-MCNC: 112 MG/DL (ref 60–100)
HBA1C MFR BLD: 9.1 % (ref 4–5.6)
HCT VFR BLD AUTO: 41.1 % (ref 39–49)
HGB BLD-MCNC: 13.3 G/DL (ref 13.7–17.3)
IMM GRANULOCYTES # BLD: 0.03 10*3/MM3 (ref 0–0.02)
IMM GRANULOCYTES NFR BLD: 0.3 % (ref 0–0.5)
INR PPP: 5.86 (ref 0.8–1.2)
LYMPHOCYTES # BLD AUTO: 2.04 10*3/MM3 (ref 0.6–4.2)
LYMPHOCYTES NFR BLD AUTO: 21.5 % (ref 10–50)
MCH RBC QN AUTO: 28.1 PG (ref 26.5–34)
MCHC RBC AUTO-ENTMCNC: 32.4 G/DL (ref 31.5–36.3)
MCV RBC AUTO: 86.7 FL (ref 80–98)
MONOCYTES # BLD AUTO: 0.59 10*3/MM3 (ref 0–0.9)
MONOCYTES NFR BLD AUTO: 6.2 % (ref 0–12)
NEUTROPHILS # BLD AUTO: 6.69 10*3/MM3 (ref 2–8.6)
NEUTROPHILS NFR BLD AUTO: 70.6 % (ref 37–80)
PLATELET # BLD AUTO: 137 10*3/MM3 (ref 150–450)
PMV BLD AUTO: 11 FL (ref 8–12)
POTASSIUM BLD-SCNC: 5.4 MMOL/L (ref 3.5–5.1)
PROT SERPL-MCNC: 8.3 G/DL (ref 6.3–8.6)
PROTHROMBIN TIME: 53.9 SECONDS (ref 11.1–15.3)
RBC # BLD AUTO: 4.74 10*6/MM3 (ref 4.37–5.74)
SODIUM BLD-SCNC: 138 MMOL/L (ref 137–145)
T4 FREE SERPL-MCNC: 1.93 NG/DL (ref 0.78–2.19)
TSH SERPL DL<=0.05 MIU/L-ACNC: 3.78 MIU/ML (ref 0.46–4.68)
WBC NRBC COR # BLD: 9.48 10*3/MM3 (ref 3.2–9.8)

## 2018-01-05 PROCEDURE — 36415 COLL VENOUS BLD VENIPUNCTURE: CPT

## 2018-01-05 PROCEDURE — 85610 PROTHROMBIN TIME: CPT

## 2018-01-05 PROCEDURE — 99214 OFFICE O/P EST MOD 30 MIN: CPT | Performed by: GENERAL PRACTICE

## 2018-01-05 PROCEDURE — 80162 ASSAY OF DIGOXIN TOTAL: CPT

## 2018-01-05 PROCEDURE — 83036 HEMOGLOBIN GLYCOSYLATED A1C: CPT

## 2018-01-05 PROCEDURE — 84439 ASSAY OF FREE THYROXINE: CPT

## 2018-01-05 PROCEDURE — 83721 ASSAY OF BLOOD LIPOPROTEIN: CPT

## 2018-01-05 PROCEDURE — 80053 COMPREHEN METABOLIC PANEL: CPT

## 2018-01-05 PROCEDURE — 84443 ASSAY THYROID STIM HORMONE: CPT

## 2018-01-05 PROCEDURE — 85025 COMPLETE CBC W/AUTO DIFF WBC: CPT

## 2018-01-05 RX ORDER — CARVEDILOL 12.5 MG/1
6.25 TABLET ORAL 2 TIMES DAILY WITH MEALS
Qty: 30 TABLET | Refills: 0
Start: 2018-01-05 | End: 2018-01-05 | Stop reason: SDUPTHER

## 2018-01-05 RX ORDER — CARVEDILOL 12.5 MG/1
6.25 TABLET ORAL 2 TIMES DAILY WITH MEALS
Qty: 30 TABLET | Refills: 0
Start: 2018-01-05 | End: 2018-02-12 | Stop reason: SDUPTHER

## 2018-01-05 NOTE — PATIENT INSTRUCTIONS
Ask Dr. Nevarez if you can change from warfarin to a newer blood thinner like Eliquis or Xarelto.     Saline spray for nose    Decrease carvedilol to 1/2 tab twice a day    Sugar should be between 80 and 200

## 2018-01-05 NOTE — PROGRESS NOTES
Subjective   Laci Pruett is a 77 y.o. male.   Chief Complaint   Patient presents with   • Follow-up   • Hypertension   • Hyperlipidemia   • Diabetes   • Dizziness     For review and evaluation of management of chronic medical problems. Labs pending.   Hypertension   This is a chronic problem. The current episode started more than 1 year ago. The problem has been gradually improving since onset. The problem is controlled. Associated symptoms include anxiety. Pertinent negatives include no chest pain, headaches, neck pain, palpitations or shortness of breath. There are no associated agents to hypertension. Risk factors for coronary artery disease include diabetes mellitus, dyslipidemia, male gender and stress. Past treatments include calcium channel blockers and ACE inhibitors. The current treatment provides significant improvement. There are no compliance problems.  Identifiable causes of hypertension include chronic renal disease.   Hyperlipidemia   This is a chronic problem. The current episode started more than 1 year ago. The problem is controlled. Recent lipid tests were reviewed and are normal. Exacerbating diseases include chronic renal disease and hypothyroidism. There are no known factors aggravating his hyperlipidemia. Pertinent negatives include no chest pain, myalgias or shortness of breath. Current antihyperlipidemic treatment includes statins. The current treatment provides significant improvement of lipids. There are no compliance problems.    Diabetes   He presents for his follow-up diabetic visit. He has type 2 diabetes mellitus. His disease course has been stable. Hypoglycemia symptoms include dizziness. Pertinent negatives for hypoglycemia include no headaches or nervousness/anxiousness. There are no diabetic associated symptoms. Pertinent negatives for diabetes include no chest pain, no fatigue and no weakness. There are no hypoglycemic complications. Symptoms are stable. Diabetic  complications include nephropathy. Risk factors for coronary artery disease include diabetes mellitus, dyslipidemia and hypertension. Current diabetic treatment includes oral agent (monotherapy). He is compliant with treatment most of the time. His weight is stable. He is following a generally healthy diet. When asked about meal planning, he reported none. He has not had a previous visit with a dietitian. He never participates in exercise. Home blood sugar record trend: does not check regularly. An ACE inhibitor/angiotensin II receptor blocker is being taken. Eye exam is current (Dr. Anderson).   Dizziness   Pertinent negatives include no abdominal pain, arthralgias, chest pain, chills, congestion, coughing, fatigue, fever, headaches, joint swelling, myalgias, nausea, neck pain, numbness, rash, sore throat, vomiting or weakness.   Hypothyroidism   This is a chronic problem. The current episode started more than 1 year ago. The problem occurs constantly. The problem has been unchanged. Pertinent negatives include no abdominal pain, arthralgias, chest pain, chills, congestion, coughing, fatigue, fever, headaches, joint swelling, myalgias, nausea, neck pain, numbness, rash, sore throat, vomiting or weakness. Nothing aggravates the symptoms. Treatments tried: levothyroxine. The treatment provided moderate relief.      The following portions of the patient's history were reviewed and updated as appropriate: allergies, current medications, past social history and problem list.    Outpatient Medications Prior to Visit   Medication Sig Dispense Refill   • ACCU-CHEK SOFTCLIX LANCETS lancets Test Blood Sugars Daily 100 each 4   • Alcohol Swabs (ALCOHOL PREP) pads Use for testing Blood Sugars at home. 200 each 4   • amiodarone (PACERONE) 200 MG tablet Take 200 mg by mouth Daily. Taking 1/2 tablet daily     • aspirin 81 MG tablet Take 81 mg by mouth daily.     • Blood Glucose Calibration (ACCU-CHEK DESMOND) solution Use for  testing his home glucometer 3 each 4   • Blood Glucose Monitoring Suppl (ACCU-CHEK DESMOND PLUS) w/Device kit USE AS DIRECTED 1 kit 0   • digoxin (LANOXIN) 250 MCG tablet 0.5 tablet(s) by mouth daily     • furosemide (LASIX) 40 MG tablet Take 40 mg by mouth 2 (Two) Times a Day.     • glimepiride (AMARYL) 4 MG tablet Take 1 tablet by mouth Every Morning Before Breakfast. For diabetes 90 tablet 3   • glucose blood (ACCU-CHEK DESMOND PLUS) test strip Test Blood Sugars Once Daily. 100 each 4   • isosorbide mononitrate (ISMO,MONOKET) 20 MG tablet TAKE 1 TABLET TWICE DAILY  7  HOURS  APART 180 tablet 2   • levothyroxine (SYNTHROID, LEVOTHROID) 75 MCG tablet TAKE 1 TABLET EVERY DAY 90 tablet 3   • nitroglycerin (NITROSTAT) 0.4 MG SL tablet Place 0.4 mg under the tongue as needed for chest pain. Take no more than 3 doses in 15 minutes.     • omeprazole (priLOSEC) 20 MG capsule TAKE 1 CAPSULE EVERY DAY FOR STOMACH 90 capsule 2   • pravastatin (PRAVACHOL) 40 MG tablet TAKE 1 TABLET AT BEDTIME 90 tablet 2   • sacubitril-valsartan (ENTRESTO) 24-26 MG tablet Take 1 tablet by mouth 2 (Two) Times a Day.     • spironolactone (ALDACTONE) 25 MG tablet TAKE 1/2 TABLET EVERY DAY 45 tablet 3   • tamsulosin (FLOMAX) 0.4 MG capsule 24 hr capsule Take 1 capsule by mouth Every Night. 90 capsule 2   • TL AARON RX 2.2-25-1 MG tablet tablet      • traZODone (DESYREL) 50 MG tablet Take 1 tablet by mouth Every Night. 30 tablet 5   • vitamin B-12 (CYANOCOBALAMIN) 100 MCG tablet Take 50 mcg by mouth Daily.     • warfarin (COUMADIN) 3 MG tablet Take 3 mg by mouth daily.     • carvedilol (COREG) 12.5 MG tablet Take 1 tablet by mouth 2 (Two) Times a Day With Meals. 30 tablet 0     No facility-administered medications prior to visit.        Review of Systems   Constitutional: Negative.  Negative for chills, fatigue, fever and unexpected weight change.   HENT: Negative.  Negative for congestion, ear pain, hearing loss, nosebleeds, rhinorrhea, sneezing, sore  "throat and tinnitus.    Eyes: Negative.  Negative for discharge.   Respiratory: Negative.  Negative for cough, shortness of breath and wheezing.    Cardiovascular: Negative.  Negative for chest pain and palpitations.   Gastrointestinal: Negative.  Negative for abdominal pain, constipation, diarrhea, nausea and vomiting.   Endocrine: Negative.    Genitourinary: Negative.  Negative for dysuria, frequency and urgency.   Musculoskeletal: Negative.  Negative for arthralgias, back pain, joint swelling, myalgias and neck pain.   Skin: Negative.  Negative for rash.   Allergic/Immunologic: Negative.    Neurological: Positive for dizziness. Negative for weakness, numbness and headaches.   Hematological: Negative.  Negative for adenopathy.   Psychiatric/Behavioral: Negative.  Negative for dysphoric mood and sleep disturbance. The patient is not nervous/anxious.        Objective   Visit Vitals   • BP 92/60 (BP Location: Left arm, Patient Position: Sitting, Cuff Size: Adult)   • Pulse 71   • Ht 170.2 cm (67\")   • Wt 80.4 kg (177 lb 3.2 oz)   • SpO2 98%   • BMI 27.75 kg/m2     Physical Exam   Constitutional: He is oriented to person, place, and time. He appears well-developed and well-nourished. No distress.   HENT:   Head: Normocephalic.   Nose: Nose normal.   Mouth/Throat: Oropharynx is clear and moist.   Eyes: Conjunctivae and EOM are normal. Pupils are equal, round, and reactive to light. Right eye exhibits no discharge. Left eye exhibits no discharge.   Neck: No thyromegaly present.   Cardiovascular: Normal rate, regular rhythm, normal heart sounds and intact distal pulses.    No murmur heard.  Pulmonary/Chest: Effort normal and breath sounds normal.   Musculoskeletal: He exhibits no edema.    Laci had a diabetic foot exam performed today.    Vascular Status -  His exam exhibits right foot vasculature normal. His exam exhibits no right foot edema. His exam exhibits left foot vasculature normal. His exam exhibits no left " foot edema.   Skin Integrity  -  His right foot skin is intact.     Laci 's left foot skin is intact. .   Foot Structure and Biomechanics -  His right foot exhibits hallux valgus.  Lymphadenopathy:     He has no cervical adenopathy.   Neurological: He is alert and oriented to person, place, and time.   Skin: Skin is warm and dry.   Psychiatric: He has a normal mood and affect.   Nursing note and vitals reviewed.      Assessment/Plan   Problem List Items Addressed This Visit        Cardiovascular and Mediastinum    Chronic atrial fibrillation (Chronic)    Relevant Medications    carvedilol (COREG) 12.5 MG tablet    Other Relevant Orders    CBC & Differential    Digoxin level    Hyperlipidemia - Primary (Chronic)       Endocrine    Acquired hypothyroidism    Relevant Medications    carvedilol (COREG) 12.5 MG tablet    Other Relevant Orders    TSH    T4, Free    Type 2 diabetes mellitus with complication, without long-term current use of insulin    Relevant Orders    Comprehensive Metabolic Panel    Hemoglobin A1c    LDL Cholesterol, Direct          Will notify regarding results. Decrease coreg as blood pressure is low. Consider home health for stabilization.     New Medications Ordered This Visit   Medications   • carvedilol (COREG) 12.5 MG tablet     Sig: Take 0.5 tablets by mouth 2 (Two) Times a Day With Meals.     Dispense:  30 tablet     Refill:  0     Emergency Supply to last until his mail order script comes in.     Return in about 3 months (around 4/5/2018) for Recheck.

## 2018-01-08 ENCOUNTER — TELEPHONE (OUTPATIENT)
Dept: FAMILY MEDICINE CLINIC | Facility: CLINIC | Age: 78
End: 2018-01-08

## 2018-01-08 ENCOUNTER — LAB (OUTPATIENT)
Dept: LAB | Facility: OTHER | Age: 78
End: 2018-01-08

## 2018-01-08 DIAGNOSIS — I48.20 CHRONIC ATRIAL FIBRILLATION (HCC): Primary | Chronic | ICD-10-CM

## 2018-01-08 LAB
INR PPP: 2.54 (ref 0.8–1.2)
PROTHROMBIN TIME: 27.6 SECONDS (ref 11.1–15.3)

## 2018-01-08 PROCEDURE — 36415 COLL VENOUS BLD VENIPUNCTURE: CPT | Performed by: GENERAL PRACTICE

## 2018-01-08 PROCEDURE — 85610 PROTHROMBIN TIME: CPT | Performed by: GENERAL PRACTICE

## 2018-01-09 ENCOUNTER — OUTSIDE FACILITY SERVICE (OUTPATIENT)
Dept: FAMILY MEDICINE CLINIC | Facility: CLINIC | Age: 78
End: 2018-01-09

## 2018-01-09 PROCEDURE — G0180 MD CERTIFICATION HHA PATIENT: HCPCS | Performed by: GENERAL PRACTICE

## 2018-01-11 PROCEDURE — 85610 PROTHROMBIN TIME: CPT | Performed by: GENERAL PRACTICE

## 2018-01-18 PROCEDURE — 85610 PROTHROMBIN TIME: CPT | Performed by: GENERAL PRACTICE

## 2018-01-26 PROCEDURE — 85610 PROTHROMBIN TIME: CPT | Performed by: INTERNAL MEDICINE

## 2018-01-29 ENCOUNTER — TRANSCRIBE ORDERS (OUTPATIENT)
Dept: GENERAL RADIOLOGY | Facility: CLINIC | Age: 78
End: 2018-01-29

## 2018-01-29 DIAGNOSIS — N18.30 CHRONIC KIDNEY DISEASE, STAGE III (MODERATE) (HCC): Primary | ICD-10-CM

## 2018-02-01 ENCOUNTER — LAB (OUTPATIENT)
Dept: LAB | Facility: OTHER | Age: 78
End: 2018-02-01

## 2018-02-01 DIAGNOSIS — N18.30 CHRONIC KIDNEY DISEASE, STAGE III (MODERATE) (HCC): ICD-10-CM

## 2018-02-01 LAB
ALBUMIN SERPL-MCNC: 3.7 G/DL (ref 3.2–5.5)
ANION GAP SERPL CALCULATED.3IONS-SCNC: 7 MMOL/L (ref 5–15)
BUN BLD-MCNC: 25 MG/DL (ref 8–25)
BUN/CREAT SERPL: 14.7 (ref 7–25)
CALCIUM SPEC-SCNC: 8 MG/DL (ref 8.4–10.8)
CHLORIDE SERPL-SCNC: 94 MMOL/L (ref 100–112)
CO2 SERPL-SCNC: 31 MMOL/L (ref 20–32)
CREAT BLD-MCNC: 1.7 MG/DL (ref 0.4–1.3)
GFR SERPL CREATININE-BSD FRML MDRD: 39 ML/MIN/1.73 (ref 42–98)
GLUCOSE BLD-MCNC: 285 MG/DL (ref 70–100)
PHOSPHATE SERPL-MCNC: 3.2 MG/DL (ref 2.5–4.6)
POTASSIUM BLD-SCNC: 4.2 MMOL/L (ref 3.4–5.4)
SODIUM BLD-SCNC: 132 MMOL/L (ref 134–146)

## 2018-02-01 PROCEDURE — 36415 COLL VENOUS BLD VENIPUNCTURE: CPT | Performed by: INTERNAL MEDICINE

## 2018-02-01 PROCEDURE — 84156 ASSAY OF PROTEIN URINE: CPT | Performed by: INTERNAL MEDICINE

## 2018-02-01 PROCEDURE — 82570 ASSAY OF URINE CREATININE: CPT | Performed by: INTERNAL MEDICINE

## 2018-02-02 LAB
CREAT UR-MCNC: 199.1 MG/DL
PROT UR-MCNC: 10.4 MG/DL
PROT/CREAT UR: 52.2 MG/G CREA (ref 0–200)

## 2018-02-06 ENCOUNTER — LAB REQUISITION (OUTPATIENT)
Dept: LAB | Facility: HOSPITAL | Age: 78
End: 2018-02-06

## 2018-02-06 DIAGNOSIS — Z79.01 LONG TERM CURRENT USE OF ANTICOAGULANT: ICD-10-CM

## 2018-02-06 LAB
INR PPP: 2.4 (ref 0.8–1.2)
INR PPP: 3.1 (ref 0.8–1.2)
INR PPP: 3.1 (ref 0.8–1.2)

## 2018-02-12 ENCOUNTER — TELEPHONE (OUTPATIENT)
Dept: FAMILY MEDICINE CLINIC | Facility: CLINIC | Age: 78
End: 2018-02-12

## 2018-02-12 DIAGNOSIS — E11.9 DIABETES MELLITUS WITHOUT COMPLICATION (HCC): ICD-10-CM

## 2018-02-12 RX ORDER — SPIRONOLACTONE 25 MG/1
12.5 TABLET ORAL DAILY
Qty: 45 TABLET | Refills: 2 | Status: SHIPPED | OUTPATIENT
Start: 2018-02-12 | End: 2018-02-19 | Stop reason: SDUPTHER

## 2018-02-12 RX ORDER — OMEPRAZOLE 20 MG/1
CAPSULE, DELAYED RELEASE ORAL
Qty: 90 CAPSULE | Refills: 2 | Status: SHIPPED | OUTPATIENT
Start: 2018-02-12 | End: 2018-02-12 | Stop reason: SDUPTHER

## 2018-02-12 RX ORDER — BLOOD GLUCOSE CONTROL HIGH,LOW
EACH MISCELLANEOUS
Qty: 3 EACH | Refills: 4 | Status: SHIPPED | OUTPATIENT
Start: 2018-02-12 | End: 2018-09-28 | Stop reason: SDUPTHER

## 2018-02-12 RX ORDER — ISOSORBIDE MONONITRATE 20 MG/1
20 TABLET ORAL 2 TIMES DAILY
Qty: 180 TABLET | Refills: 2 | Status: SHIPPED | OUTPATIENT
Start: 2018-02-12 | End: 2018-08-29

## 2018-02-12 RX ORDER — ISOSORBIDE MONONITRATE 20 MG/1
TABLET ORAL
Qty: 180 TABLET | Refills: 2 | Status: SHIPPED | OUTPATIENT
Start: 2018-02-12 | End: 2018-02-12 | Stop reason: SDUPTHER

## 2018-02-12 RX ORDER — LEVOTHYROXINE SODIUM 0.07 MG/1
TABLET ORAL
Qty: 90 TABLET | Refills: 3 | Status: SHIPPED | OUTPATIENT
Start: 2018-02-12 | End: 2018-02-12 | Stop reason: SDUPTHER

## 2018-02-12 RX ORDER — NITROGLYCERIN 0.4 MG/1
0.4 TABLET SUBLINGUAL
Qty: 75 TABLET | Refills: 3 | Status: SHIPPED | OUTPATIENT
Start: 2018-02-12 | End: 2020-01-23 | Stop reason: SDUPTHER

## 2018-02-12 RX ORDER — BLOOD-GLUCOSE METER
EACH MISCELLANEOUS
Qty: 1 KIT | Refills: 0 | Status: SHIPPED | OUTPATIENT
Start: 2018-02-12 | End: 2018-08-15 | Stop reason: SDUPTHER

## 2018-02-12 RX ORDER — TRAZODONE HYDROCHLORIDE 50 MG/1
50 TABLET ORAL NIGHTLY
Qty: 90 TABLET | Refills: 2 | Status: SHIPPED | OUTPATIENT
Start: 2018-02-12 | End: 2018-08-29

## 2018-02-12 RX ORDER — PEN NEEDLE, DIABETIC 31 GX5/16"
NEEDLE, DISPOSABLE MISCELLANEOUS
Qty: 200 EACH | Refills: 4 | Status: SHIPPED | OUTPATIENT
Start: 2018-02-12 | End: 2018-09-28 | Stop reason: SDUPTHER

## 2018-02-12 RX ORDER — CARVEDILOL 12.5 MG/1
6.25 TABLET ORAL 2 TIMES DAILY WITH MEALS
Qty: 90 TABLET | Refills: 2 | Status: SHIPPED | OUTPATIENT
Start: 2018-02-12 | End: 2019-01-24 | Stop reason: DRUGHIGH

## 2018-02-12 RX ORDER — PRAVASTATIN SODIUM 40 MG
40 TABLET ORAL NIGHTLY
Qty: 90 TABLET | Refills: 2 | Status: SHIPPED | OUTPATIENT
Start: 2018-02-12 | End: 2018-10-16 | Stop reason: SDUPTHER

## 2018-02-12 RX ORDER — GLIMEPIRIDE 4 MG/1
4 TABLET ORAL
Qty: 90 TABLET | Refills: 2 | Status: SHIPPED | OUTPATIENT
Start: 2018-02-12 | End: 2018-05-23 | Stop reason: SDUPTHER

## 2018-02-12 RX ORDER — TAMSULOSIN HYDROCHLORIDE 0.4 MG/1
1 CAPSULE ORAL NIGHTLY
Qty: 90 CAPSULE | Refills: 2 | Status: SHIPPED | OUTPATIENT
Start: 2018-02-12 | End: 2018-08-29 | Stop reason: DRUGHIGH

## 2018-02-12 RX ORDER — TRAZODONE HYDROCHLORIDE 50 MG/1
TABLET ORAL
Qty: 90 TABLET | Refills: 5 | Status: SHIPPED | OUTPATIENT
Start: 2018-02-12 | End: 2018-02-12 | Stop reason: SDUPTHER

## 2018-02-12 RX ORDER — LEVOTHYROXINE SODIUM 0.07 MG/1
75 TABLET ORAL DAILY
Qty: 90 TABLET | Refills: 2 | Status: SHIPPED | OUTPATIENT
Start: 2018-02-12 | End: 2018-10-09

## 2018-02-12 RX ORDER — OMEPRAZOLE 20 MG/1
20 CAPSULE, DELAYED RELEASE ORAL DAILY
Qty: 90 CAPSULE | Refills: 2 | Status: SHIPPED | OUTPATIENT
Start: 2018-02-12 | End: 2018-09-28 | Stop reason: SDUPTHER

## 2018-02-12 RX ORDER — AMIODARONE HYDROCHLORIDE 200 MG/1
100 TABLET ORAL DAILY
Qty: 45 TABLET | Refills: 2 | Status: SHIPPED | OUTPATIENT
Start: 2018-02-12 | End: 2018-02-20 | Stop reason: SDUPTHER

## 2018-02-12 RX ORDER — LANCETS
EACH MISCELLANEOUS
Qty: 100 EACH | Refills: 4 | Status: SHIPPED | OUTPATIENT
Start: 2018-02-12 | End: 2018-09-28 | Stop reason: SDUPTHER

## 2018-02-12 RX ORDER — FUROSEMIDE 40 MG/1
40 TABLET ORAL DAILY
Qty: 90 TABLET | Refills: 2 | Status: SHIPPED | OUTPATIENT
Start: 2018-02-12 | End: 2018-02-19 | Stop reason: SDUPTHER

## 2018-02-12 NOTE — TELEPHONE ENCOUNTER
----- Message from Jacquie Christensen sent at 2/12/2018  1:31 PM CST -----  Contact: PT CALLED  Pt called and needs all of these meds sent to [facility].   Amiodarone  Trazodone  Omeprazole  Nitroglycerin tablets  Isosorbide  Glimepiride  Levothyroxine  Spironolactone  Furosemide  Tamsulosin  New Accu-chek glucose meter  Test strips and  Lancelets and any other supplies for the meter  Phone is [phone]

## 2018-02-19 RX ORDER — SPIRONOLACTONE 25 MG/1
12.5 TABLET ORAL EVERY OTHER DAY
Qty: 25 TABLET | Refills: 2 | Status: SHIPPED | OUTPATIENT
Start: 2018-02-19 | End: 2018-09-21 | Stop reason: SDUPTHER

## 2018-02-19 RX ORDER — FUROSEMIDE 40 MG/1
40 TABLET ORAL 2 TIMES DAILY
Qty: 180 TABLET | Refills: 2 | Status: SHIPPED | OUTPATIENT
Start: 2018-02-19 | End: 2018-02-20

## 2018-02-19 NOTE — TELEPHONE ENCOUNTER
New Corrected Script, Confirmed dosing with the patient  Patient has been called and dosing changes confirmed  Dosing Changed by his cardiologist in [place], [place] Dr. Nevarez

## 2018-02-20 RX ORDER — AMIODARONE HYDROCHLORIDE 200 MG/1
100 TABLET ORAL DAILY
Qty: 45 TABLET | Refills: 2 | Status: SHIPPED | OUTPATIENT
Start: 2018-02-20 | End: 2018-08-29 | Stop reason: DRUGHIGH

## 2018-02-20 RX ORDER — FUROSEMIDE 40 MG/1
40 TABLET ORAL DAILY PRN
COMMUNITY
End: 2019-01-24 | Stop reason: SDUPTHER

## 2018-03-06 ENCOUNTER — TRANSCRIBE ORDERS (OUTPATIENT)
Dept: LAB | Facility: OTHER | Age: 78
End: 2018-03-06

## 2018-03-06 ENCOUNTER — LAB (OUTPATIENT)
Dept: LAB | Facility: OTHER | Age: 78
End: 2018-03-06

## 2018-03-06 DIAGNOSIS — Z79.899 POLYPHARMACY: ICD-10-CM

## 2018-03-06 DIAGNOSIS — Z79.899 POLYPHARMACY: Primary | ICD-10-CM

## 2018-03-06 LAB
INR PPP: 1.59 (ref 0.8–1.2)
PROTHROMBIN TIME: 19 SECONDS (ref 11.1–15.3)

## 2018-03-06 PROCEDURE — 85610 PROTHROMBIN TIME: CPT | Performed by: INTERNAL MEDICINE

## 2018-03-06 PROCEDURE — 36415 COLL VENOUS BLD VENIPUNCTURE: CPT | Performed by: INTERNAL MEDICINE

## 2018-04-05 ENCOUNTER — OFFICE VISIT (OUTPATIENT)
Dept: FAMILY MEDICINE CLINIC | Facility: CLINIC | Age: 78
End: 2018-04-05

## 2018-04-05 VITALS
HEIGHT: 67 IN | HEART RATE: 76 BPM | BODY MASS INDEX: 29.62 KG/M2 | WEIGHT: 188.7 LBS | SYSTOLIC BLOOD PRESSURE: 120 MMHG | OXYGEN SATURATION: 98 % | DIASTOLIC BLOOD PRESSURE: 70 MMHG

## 2018-04-05 DIAGNOSIS — E78.2 MIXED HYPERLIPIDEMIA: Chronic | ICD-10-CM

## 2018-04-05 DIAGNOSIS — E11.8 TYPE 2 DIABETES MELLITUS WITH COMPLICATION, WITHOUT LONG-TERM CURRENT USE OF INSULIN (HCC): Primary | ICD-10-CM

## 2018-04-05 DIAGNOSIS — I10 ESSENTIAL HYPERTENSION: Chronic | ICD-10-CM

## 2018-04-05 PROCEDURE — 99214 OFFICE O/P EST MOD 30 MIN: CPT | Performed by: GENERAL PRACTICE

## 2018-04-05 NOTE — PROGRESS NOTES
Subjective   Laci Pruett is a 78 y.o. male.   Chief Complaint   Patient presents with   • Atrial Fibrillation   • Diabetes     For review and evaluation of management of chronic medical problems. Labs pending.   Diabetes   He presents for his follow-up diabetic visit. He has type 2 diabetes mellitus. His disease course has been stable. Pertinent negatives for hypoglycemia include no dizziness, headaches or nervousness/anxiousness. There are no diabetic associated symptoms. Pertinent negatives for diabetes include no chest pain, no fatigue and no weakness. There are no hypoglycemic complications. Symptoms are stable. Diabetic complications include nephropathy. Risk factors for coronary artery disease include diabetes mellitus, dyslipidemia and hypertension. Current diabetic treatment includes oral agent (monotherapy). He is compliant with treatment most of the time. His weight is stable. He is following a generally healthy diet. When asked about meal planning, he reported none. He has not had a previous visit with a dietitian. He never participates in exercise. Home blood sugar record trend: does not check regularly. An ACE inhibitor/angiotensin II receptor blocker is being taken. Eye exam is current (Dr. Anderson).   Hypertension   This is a chronic problem. The current episode started more than 1 year ago. The problem has been gradually improving since onset. The problem is controlled. Associated symptoms include anxiety. Pertinent negatives include no chest pain, headaches, neck pain, palpitations or shortness of breath. There are no associated agents to hypertension. Risk factors for coronary artery disease include diabetes mellitus, dyslipidemia, male gender and stress. Current antihypertension treatment includes calcium channel blockers and ACE inhibitors. The current treatment provides significant improvement. There are no compliance problems.  Identifiable causes of hypertension include chronic renal  disease.   Hyperlipidemia   This is a chronic problem. The current episode started more than 1 year ago. The problem is controlled. Recent lipid tests were reviewed and are normal. Exacerbating diseases include chronic renal disease and hypothyroidism. There are no known factors aggravating his hyperlipidemia. Pertinent negatives include no chest pain, myalgias or shortness of breath. Current antihyperlipidemic treatment includes statins. The current treatment provides significant improvement of lipids. There are no compliance problems.    Hypothyroidism   This is a chronic problem. The current episode started more than 1 year ago. The problem occurs constantly. The problem has been unchanged. Pertinent negatives include no abdominal pain, arthralgias, chest pain, chills, congestion, coughing, fatigue, fever, headaches, joint swelling, myalgias, nausea, neck pain, numbness, rash, sore throat, vomiting or weakness. Nothing aggravates the symptoms. Treatments tried: levothyroxine. The treatment provided moderate relief.      The following portions of the patient's history were reviewed and updated as appropriate: allergies, current medications, past social history and problem list.    Outpatient Medications Prior to Visit   Medication Sig Dispense Refill   • ACCU-CHEK SOFTCLIX LANCETS lancets Test Blood Sugars Daily 100 each 4   • Alcohol Swabs (ALCOHOL PREP) pads Use for testing Blood Sugars at home. 200 each 4   • amiodarone (PACERONE) 200 MG tablet Take 0.5 tablets by mouth Daily. 45 tablet 2   • aspirin 81 MG tablet Take 81 mg by mouth daily.     • Blood Glucose Calibration (ACCU-CHEK DESMOND) solution Use for testing his home glucometer 3 each 4   • Blood Glucose Monitoring Suppl (ACCU-CHEK DESMOND PLUS) w/Device kit Use for Home Glucose Monitoring 1 kit 0   • carvedilol (COREG) 12.5 MG tablet Take 0.5 tablets by mouth 2 (Two) Times a Day With Meals. 90 tablet 2   • digoxin (LANOXIN) 250 MCG tablet 0.5 tablet(s) by  mouth daily     • furosemide (LASIX) 40 MG tablet Take 40 mg by mouth Daily As Needed.     • glimepiride (AMARYL) 4 MG tablet Take 1 tablet by mouth Every Morning Before Breakfast. For diabetes 90 tablet 2   • glucose blood (ACCU-CHEK DESMOND PLUS) test strip Test Blood Sugars Once Daily. 100 each 4   • isosorbide mononitrate (ISMO,MONOKET) 20 MG tablet Take 1 tablet by mouth 2 (Two) Times a Day. 180 tablet 2   • levothyroxine (SYNTHROID, LEVOTHROID) 75 MCG tablet Take 1 tablet by mouth Daily. 90 tablet 2   • nitroglycerin (NITROSTAT) 0.4 MG SL tablet Place 1 tablet under the tongue Every 5 (Five) Minutes As Needed for Chest Pain. Take no more than 3 doses in 15 minutes. 75 tablet 3   • omeprazole (priLOSEC) 20 MG capsule Take 1 capsule by mouth Daily. 90 capsule 2   • pravastatin (PRAVACHOL) 40 MG tablet Take 1 tablet by mouth Every Night. 90 tablet 2   • sacubitril-valsartan (ENTRESTO) 24-26 MG tablet Take 1 tablet by mouth 2 (Two) Times a Day.     • spironolactone (ALDACTONE) 25 MG tablet Take 0.5 tablets by mouth Every Other Day. 25 tablet 2   • tamsulosin (FLOMAX) 0.4 MG capsule 24 hr capsule Take 1 capsule by mouth Every Night. 90 capsule 2   • TL AARON RX 2.2-25-1 MG tablet tablet      • traZODone (DESYREL) 50 MG tablet Take 1 tablet by mouth Every Night. 90 tablet 2   • vitamin B-12 (CYANOCOBALAMIN) 100 MCG tablet Take 50 mcg by mouth Daily.     • warfarin (COUMADIN) 3 MG tablet Take 3 mg by mouth daily.       No facility-administered medications prior to visit.        Review of Systems   Constitutional: Negative.  Negative for chills, fatigue, fever and unexpected weight change.   HENT: Negative.  Negative for congestion, ear pain, hearing loss, nosebleeds, rhinorrhea, sneezing, sore throat and tinnitus.    Eyes: Negative.  Negative for discharge.   Respiratory: Negative.  Negative for cough, shortness of breath and wheezing.    Cardiovascular: Negative.  Negative for chest pain and palpitations.  "  Gastrointestinal: Negative.  Negative for abdominal pain, constipation, diarrhea, nausea and vomiting.   Endocrine: Negative.    Genitourinary: Negative.  Negative for dysuria, frequency and urgency.   Musculoskeletal: Negative.  Negative for arthralgias, back pain, joint swelling, myalgias and neck pain.   Skin: Negative.  Negative for rash.   Allergic/Immunologic: Negative.    Neurological: Negative.  Negative for dizziness, weakness, numbness and headaches.   Hematological: Negative.  Negative for adenopathy.   Psychiatric/Behavioral: Negative.  Negative for dysphoric mood and sleep disturbance. The patient is not nervous/anxious.        Objective   Visit Vitals  /70   Pulse 76   Ht 170.2 cm (67\")   Wt 85.6 kg (188 lb 11.2 oz)   SpO2 98%   BMI 29.55 kg/m²     Physical Exam   Constitutional: He is oriented to person, place, and time. He appears well-developed and well-nourished. No distress.   HENT:   Head: Normocephalic.   Nose: Nose normal.   Mouth/Throat: Oropharynx is clear and moist.   Eyes: Conjunctivae and EOM are normal. Pupils are equal, round, and reactive to light. Right eye exhibits no discharge. Left eye exhibits no discharge.   Neck: No thyromegaly present.   Cardiovascular: Normal rate, regular rhythm, normal heart sounds and intact distal pulses.    No murmur heard.  Pulmonary/Chest: Effort normal and breath sounds normal.   Musculoskeletal: He exhibits no edema.    Laci had a diabetic foot exam performed today.  Vascular Status -  His right foot exhibits normal foot vasculature  and no edema. His left foot exhibits normal foot vasculature  and no edema.  Skin Integrity  -  His right foot skin is intact. He has callous right foot.His left foot skin is intact.He has callous left foot..   Foot Structure and Biomechanics -  His right foot exhibits hallux valgus.  Lymphadenopathy:     He has no cervical adenopathy.   Neurological: He is alert and oriented to person, place, and time.   Skin: " Skin is warm and dry.   Psychiatric: He has a normal mood and affect.   Nursing note and vitals reviewed.       Assessment/Plan   Problem List Items Addressed This Visit        Cardiovascular and Mediastinum    Essential hypertension (Chronic)    Mixed hyperlipidemia (Chronic)       Endocrine    Type 2 diabetes mellitus with complication, without long-term current use of insulin - Primary    Relevant Orders    Comprehensive Metabolic Panel (Completed)    Hemoglobin A1c (Completed)    LDL Cholesterol, Direct (Completed)      Other Visit Diagnoses    None.         Will notify regarding results. Discussed the patient's BMI with him. BMI is above normal parameters. Follow-up plan includes:  exercise counseling and nutrition counseling.    No orders of the defined types were placed in this encounter.    Return in about 3 months (around 7/5/2018) for Recheck.

## 2018-04-06 ENCOUNTER — LAB (OUTPATIENT)
Dept: LAB | Facility: OTHER | Age: 78
End: 2018-04-06

## 2018-04-06 ENCOUNTER — TRANSCRIBE ORDERS (OUTPATIENT)
Dept: GENERAL RADIOLOGY | Facility: CLINIC | Age: 78
End: 2018-04-06

## 2018-04-06 DIAGNOSIS — N18.30 CHRONIC KIDNEY DISEASE, STAGE III (MODERATE) (HCC): Primary | ICD-10-CM

## 2018-04-06 DIAGNOSIS — N18.30 CHRONIC KIDNEY DISEASE, STAGE III (MODERATE) (HCC): ICD-10-CM

## 2018-04-06 LAB
ALBUMIN SERPL-MCNC: 3.7 G/DL (ref 3.2–5.5)
ALBUMIN/GLOB SERPL: 1 G/DL (ref 1–3)
ALP SERPL-CCNC: 62 U/L (ref 15–121)
ALT SERPL W P-5'-P-CCNC: 24 U/L (ref 10–60)
ANION GAP SERPL CALCULATED.3IONS-SCNC: 9 MMOL/L (ref 5–15)
ARTICHOKE IGE QN: 96 MG/DL (ref 0–129)
AST SERPL-CCNC: 31 U/L (ref 10–60)
BILIRUB SERPL-MCNC: 0.7 MG/DL (ref 0.2–1)
BUN BLD-MCNC: 24 MG/DL (ref 8–25)
BUN/CREAT SERPL: 16 (ref 7–25)
CALCIUM SPEC-SCNC: 8.3 MG/DL (ref 8.4–10.8)
CHLORIDE SERPL-SCNC: 98 MMOL/L (ref 100–112)
CO2 SERPL-SCNC: 31 MMOL/L (ref 20–32)
CREAT BLD-MCNC: 1.5 MG/DL (ref 0.4–1.3)
GFR SERPL CREATININE-BSD FRML MDRD: 46 ML/MIN/1.73 (ref 42–98)
GLOBULIN UR ELPH-MCNC: 3.8 GM/DL (ref 2.5–4.6)
GLUCOSE BLD-MCNC: 234 MG/DL (ref 70–100)
HBA1C MFR BLD: 10 % (ref 4–5.6)
POTASSIUM BLD-SCNC: 4.2 MMOL/L (ref 3.4–5.4)
PROT SERPL-MCNC: 7.5 G/DL (ref 6.7–8.2)
SODIUM BLD-SCNC: 138 MMOL/L (ref 134–146)

## 2018-04-06 PROCEDURE — 80053 COMPREHEN METABOLIC PANEL: CPT | Performed by: INTERNAL MEDICINE

## 2018-04-06 PROCEDURE — 83721 ASSAY OF BLOOD LIPOPROTEIN: CPT | Performed by: INTERNAL MEDICINE

## 2018-04-06 PROCEDURE — 36415 COLL VENOUS BLD VENIPUNCTURE: CPT | Performed by: INTERNAL MEDICINE

## 2018-04-06 PROCEDURE — 83036 HEMOGLOBIN GLYCOSYLATED A1C: CPT | Performed by: GENERAL PRACTICE

## 2018-04-10 ENCOUNTER — DOCUMENTATION (OUTPATIENT)
Dept: PHYSICAL THERAPY | Facility: CLINIC | Age: 78
End: 2018-04-10

## 2018-04-10 NOTE — PROGRESS NOTES
Discharge Summary  Discharge Summary from Physical Therapy Report      Date of eval: 9-7-17  Number of Visits: 3/6     Discharge Status of Patient: No recorded significant status changes. Pt did not attend full duration of PT. He stopped attending PT without explanation.     Goals: Not Met    Prognosis: Fair    Comments: D/C to [facility].    Date of Discharge: 4-10-18        Lito Garcia, AMY  Physical Therapist

## 2018-04-16 ENCOUNTER — TELEPHONE (OUTPATIENT)
Dept: FAMILY MEDICINE CLINIC | Facility: CLINIC | Age: 78
End: 2018-04-16

## 2018-04-16 DIAGNOSIS — E11.8 DIABETIC COMPLICATION (HCC): Primary | ICD-10-CM

## 2018-04-16 NOTE — PROGRESS NOTES
Pr Dr. Barnett, Mr. Pruett has been called with his recent lab results & recommendations.  Continue his current medications and follow-up as planned or sooner if any problems.    Referral sent to Dr. Rene

## 2018-04-16 NOTE — TELEPHONE ENCOUNTER
Pr Dr. Barnett, Mr. Pruett has been called with his recent lab results & recommendations.  Continue his current medications and follow-up as planned or sooner if any problems.    Referral sent to Dr. Magallanes      ----- Message from Jade Barnett MD sent at 4/13/2018  4:56 PM CDT -----  Call and tell labs are okay except his diabetes is out of control.  Recommend that he see an endocrinologist and suggest Dr. Rene in [place].  If he is agreeable then could you send a referral for uncontrolled type 2 diabetes diabetes mellitus thank

## 2018-05-24 RX ORDER — GLIMEPIRIDE 4 MG/1
TABLET ORAL
Qty: 90 TABLET | Refills: 3 | Status: SHIPPED | OUTPATIENT
Start: 2018-05-24 | End: 2018-09-28 | Stop reason: SDUPTHER

## 2018-06-05 ENCOUNTER — TRANSCRIBE ORDERS (OUTPATIENT)
Dept: LAB | Facility: HOSPITAL | Age: 78
End: 2018-06-05

## 2018-06-05 DIAGNOSIS — N18.30 CHRONIC KIDNEY DISEASE, STAGE III (MODERATE) (HCC): Primary | ICD-10-CM

## 2018-07-03 ENCOUNTER — OFFICE VISIT (OUTPATIENT)
Dept: FAMILY MEDICINE CLINIC | Facility: CLINIC | Age: 78
End: 2018-07-03

## 2018-07-03 ENCOUNTER — RESULTS ENCOUNTER (OUTPATIENT)
Dept: FAMILY MEDICINE CLINIC | Facility: CLINIC | Age: 78
End: 2018-07-03

## 2018-07-03 ENCOUNTER — LAB (OUTPATIENT)
Dept: LAB | Facility: HOSPITAL | Age: 78
End: 2018-07-03

## 2018-07-03 VITALS
SYSTOLIC BLOOD PRESSURE: 106 MMHG | BODY MASS INDEX: 28.13 KG/M2 | WEIGHT: 185.6 LBS | OXYGEN SATURATION: 98 % | DIASTOLIC BLOOD PRESSURE: 60 MMHG | HEART RATE: 86 BPM | HEIGHT: 68 IN

## 2018-07-03 DIAGNOSIS — E11.8 TYPE 2 DIABETES MELLITUS WITH COMPLICATION, WITHOUT LONG-TERM CURRENT USE OF INSULIN (HCC): Primary | ICD-10-CM

## 2018-07-03 DIAGNOSIS — E78.2 MIXED HYPERLIPIDEMIA: Chronic | ICD-10-CM

## 2018-07-03 DIAGNOSIS — Z12.11 SCREENING FOR COLON CANCER: ICD-10-CM

## 2018-07-03 DIAGNOSIS — E11.8 TYPE 2 DIABETES MELLITUS WITH COMPLICATION, WITHOUT LONG-TERM CURRENT USE OF INSULIN (HCC): ICD-10-CM

## 2018-07-03 DIAGNOSIS — I10 ESSENTIAL HYPERTENSION: Chronic | ICD-10-CM

## 2018-07-03 LAB
ANION GAP SERPL CALCULATED.3IONS-SCNC: 9 MMOL/L (ref 5–15)
BUN BLD-MCNC: 28 MG/DL (ref 7–21)
BUN/CREAT SERPL: 18.5 (ref 7–25)
CALCIUM SPEC-SCNC: 8.4 MG/DL (ref 8.4–10.2)
CHLORIDE SERPL-SCNC: 101 MMOL/L (ref 95–110)
CO2 SERPL-SCNC: 30 MMOL/L (ref 22–31)
CREAT BLD-MCNC: 1.51 MG/DL (ref 0.7–1.3)
GFR SERPL CREATININE-BSD FRML MDRD: 45 ML/MIN/1.73 (ref 42–98)
GLUCOSE BLD-MCNC: 227 MG/DL (ref 60–100)
HBA1C MFR BLD: 9.3 % (ref 4–5.6)
POTASSIUM BLD-SCNC: 4.3 MMOL/L (ref 3.5–5.1)
SODIUM BLD-SCNC: 140 MMOL/L (ref 137–145)

## 2018-07-03 PROCEDURE — 80048 BASIC METABOLIC PNL TOTAL CA: CPT

## 2018-07-03 PROCEDURE — 36415 COLL VENOUS BLD VENIPUNCTURE: CPT

## 2018-07-03 PROCEDURE — 83036 HEMOGLOBIN GLYCOSYLATED A1C: CPT

## 2018-07-03 PROCEDURE — 99213 OFFICE O/P EST LOW 20 MIN: CPT | Performed by: GENERAL PRACTICE

## 2018-07-03 NOTE — PROGRESS NOTES
Subjective   Laci Pruett is a 78 y.o. male.   Chief Complaint   Patient presents with   • Follow-up   • Hyperlipidemia   • Diabetes     For review and evaluation of management of chronic medical problems. Labs pending.   Diabetes   He presents for his follow-up diabetic visit. He has type 2 diabetes mellitus. His disease course has been stable. Pertinent negatives for hypoglycemia include no dizziness or nervousness/anxiousness. There are no diabetic associated symptoms. There are no hypoglycemic complications. Symptoms are stable. Diabetic complications include nephropathy. Risk factors for coronary artery disease include diabetes mellitus, dyslipidemia and hypertension. Current diabetic treatment includes oral agent (monotherapy). He is compliant with treatment most of the time. His weight is stable. He is following a generally healthy diet. When asked about meal planning, he reported none. He has not had a previous visit with a dietitian. He never participates in exercise. Home blood sugar record trend: does not check regularly. An ACE inhibitor/angiotensin II receptor blocker is being taken. Eye exam is current (Dr. Anderson).   Hypertension   This is a chronic problem. The current episode started more than 1 year ago. The problem has been gradually improving since onset. The problem is controlled. Associated symptoms include anxiety. Pertinent negatives include no palpitations. There are no associated agents to hypertension. Risk factors for coronary artery disease include diabetes mellitus, dyslipidemia, male gender and stress. Current antihypertension treatment includes calcium channel blockers and ACE inhibitors. The current treatment provides significant improvement. There are no compliance problems.       The following portions of the patient's history were reviewed and updated as appropriate: allergies, current medications, past social history and problem list.    Outpatient Medications Prior to Visit    Medication Sig Dispense Refill   • ACCU-CHEK SOFTCLIX LANCETS lancets Test Blood Sugars Daily 100 each 4   • Alcohol Swabs (ALCOHOL PREP) pads Use for testing Blood Sugars at home. 200 each 4   • amiodarone (PACERONE) 200 MG tablet Take 0.5 tablets by mouth Daily. 45 tablet 2   • aspirin 81 MG tablet Take 81 mg by mouth daily.     • Blood Glucose Calibration (ACCU-CHEK DESMOND) solution Use for testing his home glucometer 3 each 4   • Blood Glucose Monitoring Suppl (ACCU-CHEK DESMOND PLUS) w/Device kit Use for Home Glucose Monitoring 1 kit 0   • carvedilol (COREG) 12.5 MG tablet Take 0.5 tablets by mouth 2 (Two) Times a Day With Meals. 90 tablet 2   • digoxin (LANOXIN) 250 MCG tablet 0.5 tablet(s) by mouth daily     • furosemide (LASIX) 40 MG tablet Take 40 mg by mouth Daily As Needed.     • glimepiride (AMARYL) 4 MG tablet TAKE 1 TABLET EVERY MORNING BEFORE BREAKFAST (FOR DIABETES) 90 tablet 3   • glucose blood (ACCU-CHEK DESMOND PLUS) test strip Test Blood Sugars Once Daily. 100 each 4   • isosorbide mononitrate (ISMO,MONOKET) 20 MG tablet Take 1 tablet by mouth 2 (Two) Times a Day. 180 tablet 2   • levothyroxine (SYNTHROID, LEVOTHROID) 75 MCG tablet Take 1 tablet by mouth Daily. 90 tablet 2   • nitroglycerin (NITROSTAT) 0.4 MG SL tablet Place 1 tablet under the tongue Every 5 (Five) Minutes As Needed for Chest Pain. Take no more than 3 doses in 15 minutes. 75 tablet 3   • omeprazole (priLOSEC) 20 MG capsule Take 1 capsule by mouth Daily. 90 capsule 2   • pravastatin (PRAVACHOL) 40 MG tablet Take 1 tablet by mouth Every Night. 90 tablet 2   • sacubitril-valsartan (ENTRESTO) 24-26 MG tablet Take 1 tablet by mouth 2 (Two) Times a Day.     • spironolactone (ALDACTONE) 25 MG tablet Take 0.5 tablets by mouth Every Other Day. 25 tablet 2   • tamsulosin (FLOMAX) 0.4 MG capsule 24 hr capsule Take 1 capsule by mouth Every Night. 90 capsule 2   • TL AARON RX 2.2-25-1 MG tablet tablet      • traZODone (DESYREL) 50 MG tablet Take  "1 tablet by mouth Every Night. 90 tablet 2   • vitamin B-12 (CYANOCOBALAMIN) 100 MCG tablet Take 50 mcg by mouth Daily.     • warfarin (COUMADIN) 3 MG tablet Take 3 mg by mouth daily.       No facility-administered medications prior to visit.        Review of Systems   Constitutional: Negative.  Negative for unexpected weight change.   HENT: Negative.  Negative for ear pain, hearing loss, nosebleeds, rhinorrhea, sneezing and tinnitus.    Eyes: Negative.  Negative for discharge.   Respiratory: Negative.  Negative for wheezing.    Cardiovascular: Negative.  Negative for palpitations.   Gastrointestinal: Negative.  Negative for constipation and diarrhea.   Endocrine: Negative.    Genitourinary: Negative.  Negative for dysuria, frequency and urgency.   Musculoskeletal: Negative.  Negative for back pain.   Skin: Negative.    Allergic/Immunologic: Negative.    Neurological: Negative.  Negative for dizziness.   Hematological: Negative.  Negative for adenopathy.   Psychiatric/Behavioral: Negative.  Negative for dysphoric mood and sleep disturbance. The patient is not nervous/anxious.        Objective   Visit Vitals  /60 (BP Location: Left arm, Patient Position: Sitting, Cuff Size: Adult)   Pulse 86   Ht 172.7 cm (68\")   Wt 84.2 kg (185 lb 9.6 oz)   SpO2 98%   BMI 28.22 kg/m²     Physical Exam   Constitutional: He is oriented to person, place, and time. He appears well-developed and well-nourished. No distress.   HENT:   Head: Normocephalic.   Nose: Nose normal.   Mouth/Throat: Oropharynx is clear and moist.   Eyes: Conjunctivae and EOM are normal. Pupils are equal, round, and reactive to light. Right eye exhibits no discharge. Left eye exhibits no discharge.   Neck: No thyromegaly present.   Cardiovascular: Normal rate, regular rhythm, normal heart sounds and intact distal pulses.    No murmur heard.  Pulmonary/Chest: Effort normal and breath sounds normal.   Musculoskeletal: He exhibits no edema.   Lymphadenopathy: "     He has no cervical adenopathy.   Neurological: He is alert and oriented to person, place, and time.   Skin: Skin is warm and dry.   Psychiatric: He has a normal mood and affect.   Nursing note and vitals reviewed.    Assessment/Plan   Problem List Items Addressed This Visit        Cardiovascular and Mediastinum    Essential hypertension (Chronic)    Mixed hyperlipidemia (Chronic)       Endocrine    Type 2 diabetes mellitus with complication, without long-term current use of insulin (CMS/[facility]) - Primary    Relevant Orders    Basic Metabolic Panel (Completed)    Hemoglobin A1c    Ambulatory Referral to Diabetic Education    Hemoglobin A1c    Comprehensive Metabolic Panel    LDL Cholesterol, Direct      Other Visit Diagnoses     Screening for colon cancer        Relevant Orders    Cologuard - Stool, Per Rectum          Will notify regarding results.     No orders of the defined types were placed in this encounter.    Return in about 3 months (around 10/3/2018) for Recheck.

## 2018-07-18 ENCOUNTER — TELEPHONE (OUTPATIENT)
Dept: FAMILY MEDICINE CLINIC | Facility: CLINIC | Age: 78
End: 2018-07-18

## 2018-07-18 NOTE — TELEPHONE ENCOUNTER
Pr Dr. Barnett, Mr. Pruett has been called with his recent lab results & recommendations.  Continue his current medications and follow-up as planned or sooner if any problems.      ----- Message from Jade Barnett MD sent at 7/18/2018  2:12 PM CDT -----  Call and tell diabetes test is a little bit better, make sure that he is taking his medications as prescribed and follow-up.  In 3 months as scheduled.

## 2018-07-18 NOTE — PROGRESS NOTES
Pr Dr. Barnett, Mr. Pruett has been called with his recent lab results & recommendations.  Continue his current medications and follow-up as planned or sooner if any problems.

## 2018-07-20 ENCOUNTER — TELEPHONE (OUTPATIENT)
Dept: FAMILY MEDICINE CLINIC | Facility: CLINIC | Age: 78
End: 2018-07-20

## 2018-08-15 DIAGNOSIS — E11.9 DIABETES MELLITUS WITHOUT COMPLICATION (HCC): ICD-10-CM

## 2018-08-15 RX ORDER — BLOOD-GLUCOSE METER
EACH MISCELLANEOUS
Qty: 1 KIT | Refills: 0 | Status: SHIPPED | OUTPATIENT
Start: 2018-08-15 | End: 2018-09-24 | Stop reason: SDUPTHER

## 2018-08-20 DIAGNOSIS — E11.9 DIABETES MELLITUS WITHOUT COMPLICATION (HCC): ICD-10-CM

## 2018-08-27 DIAGNOSIS — E11.9 DIABETES MELLITUS WITHOUT COMPLICATION (HCC): ICD-10-CM

## 2018-08-29 RX ORDER — LISINOPRIL 20 MG/1
20 TABLET ORAL DAILY
Qty: 30 TABLET | Refills: 0 | Status: SHIPPED | OUTPATIENT
Start: 2018-08-29 | End: 2018-09-28 | Stop reason: SDUPTHER

## 2018-08-29 RX ORDER — TAMSULOSIN HYDROCHLORIDE 0.4 MG/1
2 CAPSULE ORAL NIGHTLY
Qty: 60 CAPSULE | Refills: 0
Start: 2018-08-29 | End: 2019-01-24 | Stop reason: SDUPTHER

## 2018-08-29 RX ORDER — AMIODARONE HYDROCHLORIDE 200 MG/1
200 TABLET ORAL DAILY
Qty: 30 TABLET | Refills: 0 | Status: SHIPPED | OUTPATIENT
Start: 2018-08-29 | End: 2019-01-24 | Stop reason: DRUGHIGH

## 2018-08-29 RX ORDER — UREA 10 %
800 LOTION (ML) TOPICAL DAILY
Qty: 30 TABLET | Refills: 0 | Status: SHIPPED | OUTPATIENT
Start: 2018-08-29

## 2018-09-21 RX ORDER — SPIRONOLACTONE 25 MG/1
TABLET ORAL
Qty: 23 TABLET | Refills: 2 | Status: SHIPPED | OUTPATIENT
Start: 2018-09-21 | End: 2019-01-24 | Stop reason: SDUPTHER

## 2018-09-24 ENCOUNTER — TELEPHONE (OUTPATIENT)
Dept: FAMILY MEDICINE CLINIC | Facility: CLINIC | Age: 78
End: 2018-09-24

## 2018-09-24 DIAGNOSIS — E11.9 DIABETES MELLITUS WITHOUT COMPLICATION (HCC): ICD-10-CM

## 2018-09-24 RX ORDER — BLOOD-GLUCOSE METER
1 EACH MISCELLANEOUS DAILY
Qty: 1 KIT | Refills: 0 | Status: SHIPPED | OUTPATIENT
Start: 2018-09-24 | End: 2018-11-26 | Stop reason: SDUPTHER

## 2018-09-24 NOTE — TELEPHONE ENCOUNTER
GWEN MARES IS NEEDING A PRESP FOR A NEW ACCU-CHEK DESMOND GLUCOSE METER TO BE SENT TO [facility].his HAS QUIT WORKING NOW AND HE HAS TALKED WITH THEM ALREADY...PLEASE SEND PRESP FOR METER AND TEST STRIPS.  HIS# [phone]

## 2018-09-28 ENCOUNTER — TELEPHONE (OUTPATIENT)
Dept: FAMILY MEDICINE CLINIC | Facility: CLINIC | Age: 78
End: 2018-09-28

## 2018-09-28 DIAGNOSIS — E11.9 DIABETES MELLITUS WITHOUT COMPLICATION (HCC): ICD-10-CM

## 2018-09-28 RX ORDER — BLOOD GLUCOSE CONTROL HIGH,LOW
EACH MISCELLANEOUS
Qty: 3 EACH | Refills: 4 | Status: SHIPPED | OUTPATIENT
Start: 2018-09-28 | End: 2019-11-11

## 2018-09-28 RX ORDER — BLOOD GLUCOSE CONTROL HIGH,LOW
EACH MISCELLANEOUS
Qty: 3 EACH | Refills: 4 | Status: SHIPPED | OUTPATIENT
Start: 2018-09-28 | End: 2018-09-28 | Stop reason: SDUPTHER

## 2018-09-28 RX ORDER — OMEPRAZOLE 20 MG/1
20 CAPSULE, DELAYED RELEASE ORAL DAILY
Qty: 90 CAPSULE | Refills: 2 | Status: SHIPPED | OUTPATIENT
Start: 2018-09-28 | End: 2019-01-24 | Stop reason: SDUPTHER

## 2018-09-28 RX ORDER — PEN NEEDLE, DIABETIC 31 GX5/16"
NEEDLE, DISPOSABLE MISCELLANEOUS
Qty: 200 EACH | Refills: 4 | Status: SHIPPED | OUTPATIENT
Start: 2018-09-28 | End: 2019-11-11

## 2018-09-28 RX ORDER — LANCETS
EACH MISCELLANEOUS
Qty: 100 EACH | Refills: 4 | Status: SHIPPED | OUTPATIENT
Start: 2018-09-28 | End: 2019-11-11

## 2018-09-28 RX ORDER — GLIMEPIRIDE 4 MG/1
4 TABLET ORAL
Qty: 90 TABLET | Refills: 3 | Status: SHIPPED | OUTPATIENT
Start: 2018-09-28 | End: 2019-01-24 | Stop reason: SDUPTHER

## 2018-09-28 RX ORDER — LISINOPRIL 20 MG/1
20 TABLET ORAL DAILY
Qty: 90 TABLET | Refills: 3 | Status: SHIPPED | OUTPATIENT
Start: 2018-09-28 | End: 2019-01-24

## 2018-09-28 NOTE — TELEPHONE ENCOUNTER
Requested Scripts Printed and faxed to [phone]  [facility] per patient's request

## 2018-09-28 NOTE — TELEPHONE ENCOUNTER
Prescriptions Printed and Faxed to [facility] per gabriel's request  Faxed to [phone]       ----- Message from Gunjan Galicia sent at 9/28/2018 12:10 PM CDT -----  Contact: GWEN  [facility]    Accu chek test strips, lancets, alcohol swabs  Aspirin  Lisinopril  Omeprazole     Said fax to pharmacy is [phone]    Gwen [phone]

## 2018-10-03 ENCOUNTER — TELEPHONE (OUTPATIENT)
Dept: FAMILY MEDICINE CLINIC | Facility: CLINIC | Age: 78
End: 2018-10-03

## 2018-10-03 NOTE — TELEPHONE ENCOUNTER
GWEN MARES IS NEEDING TO HAVE LAB ORDERS PUT IN [facility].he RESH HIS APPT TOMORROW TIL NEXT WK...BECAUSE HE COULDN'T GET THEM DONE ...  HIS CALLBACK# [phone]

## 2018-10-04 ENCOUNTER — LAB (OUTPATIENT)
Dept: LAB | Facility: HOSPITAL | Age: 78
End: 2018-10-04

## 2018-10-04 DIAGNOSIS — E11.8 TYPE 2 DIABETES MELLITUS WITH COMPLICATION, WITHOUT LONG-TERM CURRENT USE OF INSULIN (HCC): ICD-10-CM

## 2018-10-04 LAB
ALBUMIN SERPL-MCNC: 3.8 G/DL (ref 3.4–4.8)
ALBUMIN/GLOB SERPL: 0.9 G/DL (ref 1.1–1.8)
ALP SERPL-CCNC: 81 U/L (ref 38–126)
ALT SERPL W P-5'-P-CCNC: 35 U/L (ref 21–72)
ANION GAP SERPL CALCULATED.3IONS-SCNC: 8 MMOL/L (ref 5–15)
ARTICHOKE IGE QN: 87 MG/DL (ref 1–129)
AST SERPL-CCNC: 97 U/L (ref 17–59)
BILIRUB SERPL-MCNC: 0.8 MG/DL (ref 0.2–1.3)
BUN BLD-MCNC: 21 MG/DL (ref 7–21)
BUN/CREAT SERPL: 14.3 (ref 7–25)
CALCIUM SPEC-SCNC: 8.6 MG/DL (ref 8.4–10.2)
CHLORIDE SERPL-SCNC: 98 MMOL/L (ref 95–110)
CO2 SERPL-SCNC: 32 MMOL/L (ref 22–31)
CREAT BLD-MCNC: 1.47 MG/DL (ref 0.7–1.3)
GFR SERPL CREATININE-BSD FRML MDRD: 47 ML/MIN/1.73 (ref 42–98)
GLOBULIN UR ELPH-MCNC: 4.1 GM/DL (ref 2.3–3.5)
GLUCOSE BLD-MCNC: 134 MG/DL (ref 60–100)
HBA1C MFR BLD: 7.6 % (ref 4–5.6)
POTASSIUM BLD-SCNC: 4.4 MMOL/L (ref 3.5–5.1)
PROT SERPL-MCNC: 7.9 G/DL (ref 6.3–8.6)
SODIUM BLD-SCNC: 138 MMOL/L (ref 137–145)

## 2018-10-04 PROCEDURE — 83721 ASSAY OF BLOOD LIPOPROTEIN: CPT

## 2018-10-04 PROCEDURE — 83036 HEMOGLOBIN GLYCOSYLATED A1C: CPT

## 2018-10-04 PROCEDURE — 80053 COMPREHEN METABOLIC PANEL: CPT

## 2018-10-04 PROCEDURE — 36415 COLL VENOUS BLD VENIPUNCTURE: CPT

## 2018-10-09 ENCOUNTER — OFFICE VISIT (OUTPATIENT)
Dept: FAMILY MEDICINE CLINIC | Facility: CLINIC | Age: 78
End: 2018-10-09

## 2018-10-09 VITALS
HEIGHT: 68 IN | BODY MASS INDEX: 26.81 KG/M2 | OXYGEN SATURATION: 95 % | DIASTOLIC BLOOD PRESSURE: 60 MMHG | WEIGHT: 176.9 LBS | SYSTOLIC BLOOD PRESSURE: 104 MMHG | HEART RATE: 91 BPM

## 2018-10-09 DIAGNOSIS — Z12.11 SCREENING FOR COLON CANCER: Primary | ICD-10-CM

## 2018-10-09 DIAGNOSIS — E03.9 ACQUIRED HYPOTHYROIDISM: Chronic | ICD-10-CM

## 2018-10-09 DIAGNOSIS — E11.8 TYPE 2 DIABETES MELLITUS WITH COMPLICATION, WITHOUT LONG-TERM CURRENT USE OF INSULIN (HCC): Chronic | ICD-10-CM

## 2018-10-09 DIAGNOSIS — I10 ESSENTIAL HYPERTENSION: Chronic | ICD-10-CM

## 2018-10-09 DIAGNOSIS — E78.2 MIXED HYPERLIPIDEMIA: Chronic | ICD-10-CM

## 2018-10-09 DIAGNOSIS — Z23 NEED FOR IMMUNIZATION AGAINST INFLUENZA: ICD-10-CM

## 2018-10-09 PROCEDURE — G0008 ADMIN INFLUENZA VIRUS VAC: HCPCS | Performed by: GENERAL PRACTICE

## 2018-10-09 PROCEDURE — 90662 IIV NO PRSV INCREASED AG IM: CPT | Performed by: GENERAL PRACTICE

## 2018-10-09 PROCEDURE — 99214 OFFICE O/P EST MOD 30 MIN: CPT | Performed by: GENERAL PRACTICE

## 2018-10-09 RX ORDER — LEVOTHYROXINE SODIUM 0.05 MG/1
50 TABLET ORAL DAILY
Qty: 90 TABLET | Refills: 3 | Status: SHIPPED | OUTPATIENT
Start: 2018-10-09 | End: 2019-01-24 | Stop reason: SDUPTHER

## 2018-10-09 NOTE — PATIENT INSTRUCTIONS
Change levothyroxine to 50 mcg daily    Dr. Guillaume's office will get in touch about the colonoscopy

## 2018-10-09 NOTE — PROGRESS NOTES
Subjective   Laci Pruett is a 78 y.o. male.   Chief Complaint   Patient presents with   • Diabetes   • Hyperlipidemia   • Flu Vaccine     For review and evaluation of management of chronic medical problems. Labs reviewed. Cologuard was positive, will need colonoscopy. Labs from cardiologist show over-replaced with thyroid meds.   Diabetes   He presents for his follow-up diabetic visit. He has type 2 diabetes mellitus. His disease course has been stable. Pertinent negatives for hypoglycemia include no dizziness, headaches or nervousness/anxiousness. There are no diabetic associated symptoms. Pertinent negatives for diabetes include no chest pain, no fatigue and no weakness. There are no hypoglycemic complications. Symptoms are stable. Diabetic complications include nephropathy. Risk factors for coronary artery disease include diabetes mellitus, dyslipidemia and hypertension. Current diabetic treatment includes oral agent (monotherapy). He is compliant with treatment most of the time. His weight is stable. He is following a generally healthy diet. When asked about meal planning, he reported none. He has not had a previous visit with a dietitian. He never participates in exercise. Home blood sugar record trend: does not check regularly. An ACE inhibitor/angiotensin II receptor blocker is being taken. Eye exam is current (Dr. Anderson).   Hyperlipidemia   This is a chronic problem. The current episode started more than 1 year ago. The problem is controlled. Recent lipid tests were reviewed and are normal. Exacerbating diseases include diabetes and hypothyroidism. Pertinent negatives include no chest pain, myalgias or shortness of breath. Current antihyperlipidemic treatment includes statins. The current treatment provides significant improvement of lipids. There are no compliance problems.    Hypertension   This is a chronic problem. The current episode started more than 1 year ago. The problem has been gradually  improving since onset. The problem is controlled. Associated symptoms include anxiety. Pertinent negatives include no chest pain, headaches, neck pain, palpitations or shortness of breath. There are no associated agents to hypertension. Risk factors for coronary artery disease include diabetes mellitus, dyslipidemia, male gender and stress. Current antihypertension treatment includes calcium channel blockers and ACE inhibitors. The current treatment provides significant improvement. There are no compliance problems.    Hypothyroidism   This is a chronic problem. The current episode started more than 1 year ago. The problem occurs constantly. The problem has been unchanged. Pertinent negatives include no abdominal pain, arthralgias, chest pain, chills, congestion, coughing, fatigue, fever, headaches, joint swelling, myalgias, nausea, neck pain, numbness, rash, sore throat, vomiting or weakness. Nothing aggravates the symptoms. Treatments tried: levothyroxine. The treatment provided significant relief.      The following portions of the patient's history were reviewed and updated as appropriate: allergies, current medications, past social history and problem list.    Outpatient Medications Prior to Visit   Medication Sig Dispense Refill   • ACCU-CHEK SOFTCLIX LANCETS lancets Test Blood Sugars Daily 100 each 4   • Alcohol Swabs (ALCOHOL PREP) pads Use for testing Blood Sugars at home. 200 each 4   • amiodarone (PACERONE) 200 MG tablet Take 1 tablet by mouth Daily. 30 tablet 0   • aspirin 81 MG tablet Take 1 tablet by mouth Daily. 100 tablet 3   • Blood Glucose Calibration (ACCU-CHEK DESMOND) solution Use for testing his home glucometer 3 each 4   • Blood Glucose Monitoring Suppl (ACCU-CHEK DESMOND PLUS) w/Device kit Inject 1 applicator under the skin into the appropriate area as directed Daily. PT  NEEDEDS METER AND TEST STRIPS DIABETIC E11.9 1 kit 0   • carvedilol (COREG) 12.5 MG tablet Take 0.5 tablets by mouth 2 (Two)  Times a Day With Meals. 90 tablet 2   • folic acid (FOLVITE) 800 MCG tablet Take 1 tablet by mouth Daily. 30 tablet 0   • furosemide (LASIX) 40 MG tablet Take 40 mg by mouth Daily As Needed.     • glimepiride (AMARYL) 4 MG tablet Take 1 tablet by mouth Every Morning Before Breakfast. 90 tablet 3   • glucose blood (ACCU-CHEK DESMOND) test strip Test Blood Sugars Once Daily 100 each 3   • lisinopril (PRINIVIL,ZESTRIL) 20 MG tablet Take 1 tablet by mouth Daily. 90 tablet 3   • nitroglycerin (NITROSTAT) 0.4 MG SL tablet Place 1 tablet under the tongue Every 5 (Five) Minutes As Needed for Chest Pain. Take no more than 3 doses in 15 minutes. 75 tablet 3   • omeprazole (priLOSEC) 20 MG capsule Take 1 capsule by mouth Daily. 90 capsule 2   • spironolactone (ALDACTONE) 25 MG tablet TAKE 1/2 TABLET EVERY OTHER DAY 23 tablet 2   • tamsulosin (FLOMAX) 0.4 MG capsule 24 hr capsule Take 2 capsules by mouth Every Night. 60 capsule 0   • vitamin B-12 (CYANOCOBALAMIN) 100 MCG tablet Take 50 mcg by mouth Daily.     • warfarin (COUMADIN) 3 MG tablet Take 3 mg by mouth daily.     • levothyroxine (SYNTHROID, LEVOTHROID) 75 MCG tablet Take 1 tablet by mouth Daily. 90 tablet 2   • pravastatin (PRAVACHOL) 40 MG tablet Take 1 tablet by mouth Every Night. 90 tablet 2   • glucose blood (ACCU-CHEK DESMOND PLUS) test strip Test Blood Sugars Once Daily. 100 each 1     No facility-administered medications prior to visit.        Review of Systems   Constitutional: Negative.  Negative for chills, fatigue, fever and unexpected weight change.   HENT: Negative.  Negative for congestion, ear pain, hearing loss, nosebleeds, rhinorrhea, sneezing, sore throat and tinnitus.    Eyes: Negative.  Negative for discharge.   Respiratory: Negative.  Negative for cough, shortness of breath and wheezing.    Cardiovascular: Negative.  Negative for chest pain and palpitations.   Gastrointestinal: Negative.  Negative for abdominal pain, constipation, diarrhea, nausea and  "vomiting.   Endocrine: Negative.    Genitourinary: Negative.  Negative for dysuria, frequency and urgency.   Musculoskeletal: Negative.  Negative for arthralgias, back pain, joint swelling, myalgias and neck pain.   Skin: Negative.  Negative for rash.   Allergic/Immunologic: Negative.    Neurological: Negative.  Negative for dizziness, weakness, numbness and headaches.   Hematological: Negative.  Negative for adenopathy.   Psychiatric/Behavioral: Negative.  Negative for dysphoric mood and sleep disturbance. The patient is not nervous/anxious.        Objective   Visit Vitals  /60   Pulse 91   Ht 172.7 cm (68\")   Wt 80.2 kg (176 lb 14.4 oz)   SpO2 95%   BMI 26.90 kg/m²     Physical Exam   Constitutional: He is oriented to person, place, and time. He appears well-developed and well-nourished. No distress.   HENT:   Head: Normocephalic.   Nose: Nose normal.   Mouth/Throat: Oropharynx is clear and moist.   Eyes: Pupils are equal, round, and reactive to light. Conjunctivae and EOM are normal. Right eye exhibits no discharge. Left eye exhibits no discharge.   Neck: No thyromegaly present.   Cardiovascular: Normal rate, regular rhythm, normal heart sounds and intact distal pulses.    No murmur heard.  Pulmonary/Chest: Effort normal and breath sounds normal.   Musculoskeletal: He exhibits no edema.   Lymphadenopathy:     He has no cervical adenopathy.   Neurological: He is alert and oriented to person, place, and time.   Skin: Skin is warm and dry.   Psychiatric: He has a normal mood and affect.   Nursing note and vitals reviewed.    Results for orders placed or performed in visit on 10/04/18   Hemoglobin A1c   Result Value Ref Range    Hemoglobin A1C 7.6 (H) 4 - 5.6 %   Comprehensive Metabolic Panel   Result Value Ref Range    Glucose 134 (H) 60 - 100 mg/dL    BUN 21 7 - 21 mg/dL    Creatinine 1.47 (H) 0.70 - 1.30 mg/dL    Sodium 138 137 - 145 mmol/L    Potassium 4.4 3.5 - 5.1 mmol/L    Chloride 98 95 - 110 mmol/L    " CO2 32.0 (H) 22.0 - 31.0 mmol/L    Calcium 8.6 8.4 - 10.2 mg/dL    Total Protein 7.9 6.3 - 8.6 g/dL    Albumin 3.80 3.40 - 4.80 g/dL    ALT (SGPT) 35 21 - 72 U/L    AST (SGOT) 97 (H) 17 - 59 U/L    Alkaline Phosphatase 81 38 - 126 U/L    Total Bilirubin 0.8 0.2 - 1.3 mg/dL    eGFR Non African Amer 47 42 - 98 mL/min/1.73    Globulin 4.1 (H) 2.3 - 3.5 gm/dL    A/G Ratio 0.9 (L) 1.1 - 1.8 g/dL    BUN/Creatinine Ratio 14.3 7.0 - 25.0    Anion Gap 8.0 5.0 - 15.0 mmol/L   LDL Cholesterol, Direct   Result Value Ref Range    LDL Cholesterol  87 1 - 129 mg/dL      Assessment/Plan   Problem List Items Addressed This Visit        Cardiovascular and Mediastinum    Essential hypertension (Chronic)    Mixed hyperlipidemia (Chronic)       Endocrine    Acquired hypothyroidism    Relevant Medications    levothyroxine (SYNTHROID) 50 MCG tablet    Other Relevant Orders    TSH    T4, Free    Type 2 diabetes mellitus with complication, without long-term current use of insulin (CMS/HCC)      Other Visit Diagnoses     Screening for colon cancer    -  Primary    Relevant Orders    Ambulatory Referral to General Surgery    Need for immunization against influenza        Relevant Orders    Flu Vaccine High Dose PF 65YR+ ([id number]) (Completed)         Decrease levothyroxine.     New Medications Ordered This Visit   Medications   • levothyroxine (SYNTHROID) 50 MCG tablet     Sig: Take 1 tablet by mouth Daily.     Dispense:  90 tablet     Refill:  3     Return in about 3 months (around 1/9/2019) for Recheck.

## 2018-10-15 ENCOUNTER — CONSULT (OUTPATIENT)
Dept: SURGERY | Facility: CLINIC | Age: 78
End: 2018-10-15

## 2018-10-15 VITALS
WEIGHT: 179 LBS | SYSTOLIC BLOOD PRESSURE: 120 MMHG | TEMPERATURE: 97.7 F | HEART RATE: 84 BPM | BODY MASS INDEX: 27.13 KG/M2 | DIASTOLIC BLOOD PRESSURE: 60 MMHG | HEIGHT: 68 IN

## 2018-10-15 DIAGNOSIS — R19.5 POSITIVE COLORECTAL CANCER SCREENING USING COLOGUARD TEST: Primary | ICD-10-CM

## 2018-10-15 PROCEDURE — 99202 OFFICE O/P NEW SF 15 MIN: CPT | Performed by: SURGERY

## 2018-10-15 RX ORDER — DEXTROSE AND SODIUM CHLORIDE 5; .45 G/100ML; G/100ML
100 INJECTION, SOLUTION INTRAVENOUS CONTINUOUS
Status: CANCELLED | OUTPATIENT
Start: 2018-11-16

## 2018-10-15 NOTE — PROGRESS NOTES
Subjective   Laci Pruett is a 78 y.o. male     History of Present Illness referred by Dr. Barnett after patient underwent a: Guarded test and was found to be positive.  Patient's had significant cardiomyopathy and is followed by cardiologist over in [place].  Clinically sounds like it is congestive heart failure symptoms were worse in the past and they're actually better as he relates.  Able to walk further without having to rest and overall feeling stronger.  Unclear for the record specifically what his ejection fraction is.  Patient is also on Coumadin for atrial history of atrial fibrillation and he has a pacemaker and defibrillator in place.  No history of colon cancer in the family.  He had a colonoscopy done in the distant past but was over 10 years ago and he reports it was unremarkable.    Review of Systems   Constitutional: Negative.    HENT: Positive for hearing loss.    Eyes: Negative.    Respiratory: Positive for shortness of breath.    Gastrointestinal: Positive for blood in stool.        Difficulty Swallowing   Endocrine: Negative.    Genitourinary: Positive for difficulty urinating and frequency.   Musculoskeletal: Positive for arthralgias.   Skin: Negative.    Allergic/Immunologic: Negative.    Neurological: Positive for dizziness.   Hematological: Negative.    Psychiatric/Behavioral: Negative.      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 EF of 25%.RV pacing  noted.Moderate to severe 3+ mitral regurg noted.Hemodynamic data as mentioned.Moderate pulmonary hypertension with CHF.   • OTHER SURGICAL HISTORY  06/28/2008    Colonoscopy remove polyps ,diverticula, roids   • OTHER SURGICAL HISTORY  03/15/2012    I&D, Simple    • OTHER SURGICAL HISTORY  06/11/2008    Vision screen ,DIABETIC EYE EXAM   • OTHER SURGICAL HISTORY      Pacemaker AICD pocket      Family History   Problem Relation Age of Onset   • Breast cancer Sister    • Arthritis Sister    • Cancer Sister    • Heart disease Sister    • Coronary artery disease Other    • Diabetes Other    • Hypertension Other    • Arthritis Mother    • Diabetes Mother    • Heart disease Mother    • Liver disease Mother    • Arthritis Father    • Diabetes Father    • Heart disease Father    • Arthritis Brother      Social History     Social History   • Marital status:      Spouse name: N/A   • Number of children: N/A   • Years of education: N/A     Occupational History   • Not on file.     Social History Main Topics   • Smoking status: Never Smoker   • Smokeless tobacco: Never Used   • Alcohol use No      Comment: 50 yrs ago   • Drug use: Unknown   • Sexual activity: Not on file     Other Topics Concern   • Not on file     Social History Narrative   • No narrative on file     No Known Allergies    Home Medications:  Prior to Admission medications    Medication Sig Start Date End Date Taking? Authorizing Provider   ACCU-CHEK SOFTCLIX LANCETS lancets Test Blood Sugars Daily 9/28/18  Yes Jade Barnett MD   Alcohol Swabs (ALCOHOL PREP) pads Use for testing Blood Sugars at home. 9/28/18  Yes Jade Barnett MD   amiodarone (PACERONE) 200 MG tablet Take 1 tablet by mouth Daily. 8/29/18  Yes Jade Barnett MD   aspirin 81 MG tablet Take 1 tablet by mouth Daily. 9/28/18  Yes Jade Barnett MD   Blood Glucose Calibration (ACCU-CHEK DESMOND) solution Use for testing his home glucometer 9/28/18  Yes Jade Barnett MD    Blood Glucose Monitoring Suppl (ACCU-CHEK DESMOND PLUS) w/Device kit Inject 1 applicator under the skin into the appropriate area as directed Daily. PT  NEEDEDS METER AND TEST STRIPS DIABETIC E11.9 9/24/18  Yes Jade Barnett MD   carvedilol (COREG) 12.5 MG tablet Take 0.5 tablets by mouth 2 (Two) Times a Day With Meals. 2/12/18  Yes Jade Barnett MD   folic acid (FOLVITE) 800 MCG tablet Take 1 tablet by mouth Daily. 8/29/18  Yes Jade Barnett MD   furosemide (LASIX) 40 MG tablet Take 40 mg by mouth Daily As Needed.   Yes Dianna Henry MD   glimepiride (AMARYL) 4 MG tablet Take 1 tablet by mouth Every Morning Before Breakfast. 9/28/18  Yes Jade Barnett MD   glucose blood (ACCU-CHEK DESMOND) test strip Test Blood Sugars Once Daily 9/28/18  Yes Jade Barnett MD   levothyroxine (SYNTHROID) 50 MCG tablet Take 1 tablet by mouth Daily. 10/9/18  Yes Jade Barnett MD   lisinopril (PRINIVIL,ZESTRIL) 20 MG tablet Take 1 tablet by mouth Daily. 9/28/18  Yes Jade Barnett MD   Multiple Vitamin (VITAMIN E/FOLIC ACID/B-6/B-12) capsule Take 25 mg/day by mouth.   Yes ProviderDianna MD   nitroglycerin (NITROSTAT) 0.4 MG SL tablet Place 1 tablet under the tongue Every 5 (Five) Minutes As Needed for Chest Pain. Take no more than 3 doses in 15 minutes. 2/12/18  Yes Jade Barnett MD   omeprazole (priLOSEC) 20 MG capsule Take 1 capsule by mouth Daily. 9/28/18  Yes Jade Barnett MD   pravastatin (PRAVACHOL) 40 MG tablet Take 1 tablet by mouth Every Night. 2/12/18  Yes Jade Barnett MD   spironolactone (ALDACTONE) 25 MG tablet TAKE 1/2 TABLET EVERY OTHER DAY 9/21/18  Yes Jade Barnett MD   tamsulosin (FLOMAX) 0.4 MG capsule 24 hr capsule Take 2 capsules by mouth Every Night. 8/29/18  Yes Jade Barnett MD   vitamin B-12 (CYANOCOBALAMIN) 100 MCG tablet Take 50 mcg by mouth Daily.   Yes Provider, MD Dianna   warfarin (COUMADIN) 3 MG tablet Take 3 mg by mouth daily.  9/9/16  Yes Provider, MD Dianna   docusate sodium (COLACE) 50 MG capsule Take 1 capsule by mouth.    Provider, MD Dianna       Objective   Physical Exam   Constitutional: He is oriented to person, place, and time. He appears well-developed and well-nourished. No distress.   HENT:   Head: Normocephalic and atraumatic.   Nose: Nose normal.   Eyes: Conjunctivae are normal.   Neck: Normal range of motion. No tracheal deviation present. No thyromegaly present.   Cardiovascular: Normal rate and normal heart sounds.    No murmur heard.  Pulmonary/Chest: Effort normal and breath sounds normal. No respiratory distress. He has no wheezes. He has no rales. He exhibits no tenderness.   Abdominal: Soft. He exhibits no distension. There is no tenderness. There is no rebound and no guarding. No hernia.   Musculoskeletal: He exhibits no tenderness or deformity.   Neurological: He is alert and oriented to person, place, and time.   Skin: Skin is warm and dry. No rash noted.   Psychiatric: He has a normal mood and affect. His behavior is normal. Judgment and thought content normal.   Vitals reviewed.      Assessment/Plan positive cologuard test.  Patient was significant cardiomyopathy though clinically he is improved recently.  We discussed colonoscopy and I think he most likely would be able to tolerate a colonoscopy with sedation.  The real question be whether he would tolerate general anesthesia for any type of surgical operation of his abdomen feeling was found on colonoscopy.  Patient had a family member who did not make it through the procedure and so he is very concerned.  I advised him to talk to his cardiologist about what his risk are as we do not have any data and her system that I can provide any kind of recommendation along those lines.  He does wish to go and set him up for the colonoscopy which we will and I explained the procedure the prep the alternatives the risk and benefits and he clearly understands and  wishes to proceed.  He says he will talk to his cardiologist in [place] about these other issues.      The encounter diagnosis was Positive colorectal cancer screening using Cologuard test.                     This document has been electronically signed by Martínez Guillaume MD on October 15, 2018 4:33 PM

## 2018-10-15 NOTE — PATIENT INSTRUCTIONS

## 2018-10-16 RX ORDER — PRAVASTATIN SODIUM 40 MG
TABLET ORAL
Qty: 90 TABLET | Refills: 2 | Status: SHIPPED | OUTPATIENT
Start: 2018-10-16 | End: 2019-01-24 | Stop reason: SDUPTHER

## 2018-11-05 ENCOUNTER — APPOINTMENT (OUTPATIENT)
Dept: LAB | Facility: HOSPITAL | Age: 78
End: 2018-11-05

## 2018-11-05 LAB
25(OH)D3 SERPL-MCNC: 31.8 NG/ML (ref 30–100)
ALBUMIN SERPL-MCNC: 4.1 G/DL (ref 3.4–4.8)
ANION GAP SERPL CALCULATED.3IONS-SCNC: 8 MMOL/L (ref 5–15)
BUN BLD-MCNC: 29 MG/DL (ref 7–21)
BUN/CREAT SERPL: 18.4 (ref 7–25)
CALCIUM SPEC-SCNC: 8.9 MG/DL (ref 8.4–10.2)
CHLORIDE SERPL-SCNC: 100 MMOL/L (ref 95–110)
CO2 SERPL-SCNC: 32 MMOL/L (ref 22–31)
CREAT BLD-MCNC: 1.58 MG/DL (ref 0.7–1.3)
CREAT UR-MCNC: 41.3 MG/DL
GFR SERPL CREATININE-BSD FRML MDRD: 43 ML/MIN/1.73 (ref 42–98)
GLUCOSE BLD-MCNC: 134 MG/DL (ref 60–100)
PHOSPHATE SERPL-MCNC: 3.7 MG/DL (ref 2.4–4.4)
POTASSIUM BLD-SCNC: 4.9 MMOL/L (ref 3.5–5.1)
PROT UR-MCNC: 10.5 MG/DL
PROT/CREAT UR: 254.2 MG/G CREA (ref 0–200)
SODIUM BLD-SCNC: 140 MMOL/L (ref 137–145)

## 2018-11-05 PROCEDURE — 83970 ASSAY OF PARATHORMONE: CPT | Performed by: INTERNAL MEDICINE

## 2018-11-05 PROCEDURE — 80069 RENAL FUNCTION PANEL: CPT | Performed by: INTERNAL MEDICINE

## 2018-11-05 PROCEDURE — 82570 ASSAY OF URINE CREATININE: CPT | Performed by: INTERNAL MEDICINE

## 2018-11-05 PROCEDURE — 36415 COLL VENOUS BLD VENIPUNCTURE: CPT | Performed by: INTERNAL MEDICINE

## 2018-11-05 PROCEDURE — 82306 VITAMIN D 25 HYDROXY: CPT | Performed by: INTERNAL MEDICINE

## 2018-11-05 PROCEDURE — 84156 ASSAY OF PROTEIN URINE: CPT | Performed by: INTERNAL MEDICINE

## 2018-11-06 ENCOUNTER — TRANSCRIBE ORDERS (OUTPATIENT)
Dept: LAB | Facility: HOSPITAL | Age: 78
End: 2018-11-06

## 2018-11-06 DIAGNOSIS — N18.30 CHRONIC KIDNEY DISEASE, STAGE III (MODERATE) (HCC): Primary | ICD-10-CM

## 2018-11-07 LAB — PTH-INTACT SERPL-MCNC: 223.1 PG/ML (ref 10–65)

## 2018-11-13 RX ORDER — PHENOL 1.4 %
10 AEROSOL, SPRAY (ML) MUCOUS MEMBRANE NIGHTLY
COMMUNITY
End: 2019-01-24

## 2018-11-16 ENCOUNTER — ANESTHESIA (OUTPATIENT)
Dept: GASTROENTEROLOGY | Facility: HOSPITAL | Age: 78
End: 2018-11-16

## 2018-11-16 ENCOUNTER — ANESTHESIA EVENT (OUTPATIENT)
Dept: GASTROENTEROLOGY | Facility: HOSPITAL | Age: 78
End: 2018-11-16

## 2018-11-16 ENCOUNTER — HOSPITAL ENCOUNTER (OUTPATIENT)
Facility: HOSPITAL | Age: 78
Setting detail: HOSPITAL OUTPATIENT SURGERY
Discharge: HOME | End: 2018-11-16
Attending: SURGERY | Admitting: SURGERY

## 2018-11-16 VITALS
OXYGEN SATURATION: 94 % | SYSTOLIC BLOOD PRESSURE: 110 MMHG | TEMPERATURE: 97.4 F | HEART RATE: 72 BPM | DIASTOLIC BLOOD PRESSURE: 62 MMHG | WEIGHT: 174.16 LBS | BODY MASS INDEX: 26.4 KG/M2 | RESPIRATION RATE: 20 BRPM | HEIGHT: 68 IN

## 2018-11-16 DIAGNOSIS — R19.5 POSITIVE COLORECTAL CANCER SCREENING USING COLOGUARD TEST: ICD-10-CM

## 2018-11-16 LAB — GLUCOSE BLDC GLUCOMTR-MCNC: 63 MG/DL (ref 70–130)

## 2018-11-16 PROCEDURE — 45385 COLONOSCOPY W/LESION REMOVAL: CPT | Performed by: SURGERY

## 2018-11-16 PROCEDURE — 88305 TISSUE EXAM BY PATHOLOGIST: CPT | Performed by: SURGERY

## 2018-11-16 PROCEDURE — 25010000002 PROPOFOL 10 MG/ML EMULSION: Performed by: NURSE ANESTHETIST, CERTIFIED REGISTERED

## 2018-11-16 PROCEDURE — 88305 TISSUE EXAM BY PATHOLOGIST: CPT | Performed by: PATHOLOGY

## 2018-11-16 PROCEDURE — 82962 GLUCOSE BLOOD TEST: CPT

## 2018-11-16 RX ORDER — PROPOFOL 10 MG/ML
VIAL (ML) INTRAVENOUS AS NEEDED
Status: DISCONTINUED | OUTPATIENT
Start: 2018-11-16 | End: 2018-11-16 | Stop reason: SURG

## 2018-11-16 RX ORDER — LIDOCAINE HYDROCHLORIDE 10 MG/ML
INJECTION, SOLUTION INFILTRATION; PERINEURAL AS NEEDED
Status: DISCONTINUED | OUTPATIENT
Start: 2018-11-16 | End: 2018-11-16

## 2018-11-16 RX ORDER — DEXTROSE AND SODIUM CHLORIDE 5; .45 G/100ML; G/100ML
100 INJECTION, SOLUTION INTRAVENOUS CONTINUOUS
Status: DISCONTINUED | OUTPATIENT
Start: 2018-11-16 | End: 2018-11-16 | Stop reason: HOSPADM

## 2018-11-16 RX ADMIN — DEXTROSE AND SODIUM CHLORIDE 100 ML/HR: 5; 450 INJECTION, SOLUTION INTRAVENOUS at 10:00

## 2018-11-16 RX ADMIN — PROPOFOL 20 MG: 10 INJECTION, EMULSION INTRAVENOUS at 10:25

## 2018-11-16 RX ADMIN — PROPOFOL 40 MG: 10 INJECTION, EMULSION INTRAVENOUS at 10:15

## 2018-11-16 RX ADMIN — PROPOFOL 20 MG: 10 INJECTION, EMULSION INTRAVENOUS at 10:30

## 2018-11-16 RX ADMIN — PROPOFOL 20 MG: 10 INJECTION, EMULSION INTRAVENOUS at 10:22

## 2018-11-16 RX ADMIN — PROPOFOL 20 MG: 10 INJECTION, EMULSION INTRAVENOUS at 10:35

## 2018-11-16 RX ADMIN — PROPOFOL 10 MG: 10 INJECTION, EMULSION INTRAVENOUS at 10:17

## 2018-11-16 RX ADMIN — PROPOFOL 10 MG: 10 INJECTION, EMULSION INTRAVENOUS at 10:20

## 2018-11-16 NOTE — ANESTHESIA POSTPROCEDURE EVALUATION
Patient: Laci Pruett    Procedure Summary     Date:  11/16/18 Room / Location:  [facility] ENDOSCOPY 2 / [facility] ENDOSCOPY    Anesthesia Start:  1013 Anesthesia Stop:  1037    Procedure:  COLONOSCOPY (N/A ) Diagnosis:       Positive colorectal cancer screening using Cologuard test      (Positive colorectal cancer screening using Cologuard test [R19.5])    Surgeon:  Martínez Guillaume MD Provider:  Junior Cifuentes CRNA    Anesthesia Type:  MAC ASA Status:  3          Anesthesia Type: MAC  Last vitals  BP   119/63 (11/16/18 0949)   Temp   96.9 °F (36.1 °C) (11/16/18 0949)   Pulse   82 (11/16/18 0949)   Resp   18 (11/16/18 0949)     SpO2   95 % (11/16/18 0949)     Post Anesthesia Care and Evaluation    Patient location during evaluation: bedside  Patient participation: complete - patient participated  Level of consciousness: awake and alert  Pain score: 0  Pain management: adequate  Airway patency: patent  Anesthetic complications: No anesthetic complications  PONV Status: none  Cardiovascular status: acceptable and stable  Respiratory status: acceptable and spontaneous ventilation  Hydration status: acceptable

## 2018-11-16 NOTE — ANESTHESIA PREPROCEDURE EVALUATION
Anesthesia Evaluation     NPO Solid Status: > 8 hours  NPO Liquid Status: > 4 hours           Airway   Mallampati: II  TM distance: >3 FB  Neck ROM: limited  No difficulty expected  Dental    (+) lower dentures and upper dentures    Pulmonary     breath sounds clear to auscultation  Cardiovascular   Exercise tolerance: poor (<4 METS)    Rhythm: irregular  Rate: normal    (+) hypertension 2 medications or greater, dysrhythmias Atrial Fib, hyperlipidemia,       Neuro/Psych  GI/Hepatic/Renal/Endo    (+)   diabetes mellitus type 2, hypothyroidism,     Musculoskeletal     Abdominal    Substance History      OB/GYN          Other   (+) arthritis                   Anesthesia Plan    ASA 3     MAC     Anesthetic plan, all risks, benefits, and alternatives have been provided, discussed and informed consent has been obtained with: patient.    Plan discussed with CRNA.

## 2018-11-16 NOTE — H&P
Laci Pruett is a 78 y.o. male      History of Present Illness referred by Dr. Barnett after patient underwent a: Guarded test and was found to be positive.  Patient's had significant cardiomyopathy and is followed by cardiologist over in [place].  Clinically sounds like it is congestive heart failure symptoms were worse in the past and they're actually better as he relates.  Able to walk further without having to rest and overall feeling stronger.  Unclear for the record specifically what his ejection fraction is.  Patient is also on Coumadin for atrial history of atrial fibrillation and he has a pacemaker and defibrillator in place.  No history of colon cancer in the family.  He had a colonoscopy done in the distant past but was over 10 years ago and he reports it was unremarkable.     Review of Systems   Constitutional: Negative.    HENT: Positive for hearing loss.    Eyes: Negative.    Respiratory: Positive for shortness of breath.    Gastrointestinal: Positive for blood in stool.        Difficulty Swallowing   Endocrine: Negative.    Genitourinary: Positive for difficulty urinating and frequency.   Musculoskeletal: Positive for arthralgias.   Skin: Negative.    Allergic/Immunologic: Negative.    Neurological: Positive for dizziness.   Hematological: Negative.    Psychiatric/Behavioral: Negative.       Medical History        Past Medical History:   Diagnosis Date   • Acquired hypothyroidism     • Chronic atrial fibrillation (CMS/HCC)     • Degeneration of intervertebral disc between fourth and fifth lumbar vertebrae     • Essential hypertension     • Hyperlipidemia     • Need for prophylactic vaccination against Streptococcus pneumoniae (pneumococcus)     • Type 2 diabetes mellitus (CMS/HCC)       Type 2 diabetes mellitus - no retinopathy            Surgical History         Past Surgical History:   Procedure Laterality Date   • CARDIAC CATHETERIZATION   01/21/2016     Coronary arteries are essentially  normal.Cardiomyopathy. EF of 25%.RV pacing noted.Moderate to severe 3+ mitral regurg noted.Hemodynamic data as mentioned.Moderate pulmonary hypertension with CHF.   • OTHER SURGICAL HISTORY   06/28/2008     Colonoscopy remove polyps ,diverticula, roids   • OTHER SURGICAL HISTORY   03/15/2012     I&D, Simple    • OTHER SURGICAL HISTORY   06/11/2008     Vision screen ,DIABETIC EYE EXAM   • OTHER SURGICAL HISTORY         Pacemaker AICD pocket                Family History   Problem Relation Age of Onset   • Breast cancer Sister     • Arthritis Sister     • Cancer Sister     • Heart disease Sister     • Coronary artery disease Other     • Diabetes Other     • Hypertension Other     • Arthritis Mother     • Diabetes Mother     • Heart disease Mother     • Liver disease Mother     • Arthritis Father     • Diabetes Father     • Heart disease Father     • Arthritis Brother        Social History               Social History   • Marital status:        Spouse name: N/A   • Number of children: N/A   • Years of education: N/A          Occupational History   • Not on file.               Social History Main Topics     • Smoking status: Never Smoker    • Smokeless tobacco: Never Used    • Alcohol use No          Comment: 50 yrs ago     • Drug use: Unknown   • Sexual activity: Not on file            Other Topics Concern   • Not on file          Social History Narrative   • No narrative on file         No Known Allergies     Home Medications:          Prior to Admission medications    Medication Sig Start Date End Date Taking? Authorizing Provider   ACCU-CHEK SOFTCLIX LANCETS lancets Test Blood Sugars Daily 9/28/18   Yes Jade Barnett MD   Alcohol Swabs (ALCOHOL PREP) pads Use for testing Blood Sugars at home. 9/28/18   Yes Jade Barnett MD   amiodarone (PACERONE) 200 MG tablet Take 1 tablet by mouth Daily. 8/29/18   Yes Jade Barnett MD   aspirin 81 MG tablet Take 1 tablet by mouth Daily. 9/28/18   Yes Anibal  Jade POWER MD   Blood Glucose Calibration (ACCU-CHEK DESMOND) solution Use for testing his home glucometer 9/28/18   Yes Jade Barnett MD   Blood Glucose Monitoring Suppl (ACCU-CHEK DESMOND PLUS) w/Device kit Inject 1 applicator under the skin into the appropriate area as directed Daily. PT  NEEDEDS METER AND TEST STRIPS DIABETIC E11.9 9/24/18   Yes Jade Barnett MD   carvedilol (COREG) 12.5 MG tablet Take 0.5 tablets by mouth 2 (Two) Times a Day With Meals. 2/12/18   Yes Jade Barnett MD   folic acid (FOLVITE) 800 MCG tablet Take 1 tablet by mouth Daily. 8/29/18   Yes Jade Barnett MD   furosemide (LASIX) 40 MG tablet Take 40 mg by mouth Daily As Needed.     Yes Dianna Henry MD   glimepiride (AMARYL) 4 MG tablet Take 1 tablet by mouth Every Morning Before Breakfast. 9/28/18   Yes Jade Barnett MD   glucose blood (ACCU-CHEK DESMOND) test strip Test Blood Sugars Once Daily 9/28/18   Yes Jade Barnett MD   levothyroxine (SYNTHROID) 50 MCG tablet Take 1 tablet by mouth Daily. 10/9/18   Yes Jade Barnett MD   lisinopril (PRINIVIL,ZESTRIL) 20 MG tablet Take 1 tablet by mouth Daily. 9/28/18   Yes Jade Barnett MD   Multiple Vitamin (VITAMIN E/FOLIC ACID/B-6/B-12) capsule Take 25 mg/day by mouth.     Yes ProviderDianna MD   nitroglycerin (NITROSTAT) 0.4 MG SL tablet Place 1 tablet under the tongue Every 5 (Five) Minutes As Needed for Chest Pain. Take no more than 3 doses in 15 minutes. 2/12/18   Yes Jade Barnett MD   omeprazole (priLOSEC) 20 MG capsule Take 1 capsule by mouth Daily. 9/28/18   Yes Jade Barnett MD   pravastatin (PRAVACHOL) 40 MG tablet Take 1 tablet by mouth Every Night. 2/12/18   Yes Jade Barnett MD   spironolactone (ALDACTONE) 25 MG tablet TAKE 1/2 TABLET EVERY OTHER DAY 9/21/18   Yes Jade Barnett MD   tamsulosin (FLOMAX) 0.4 MG capsule 24 hr capsule Take 2 capsules by mouth Every Night. 8/29/18   Yes Jade Barnett MD   vitamin  B-12 (CYANOCOBALAMIN) 100 MCG tablet Take 50 mcg by mouth Daily.     Yes Provider, MD Dianna   warfarin (COUMADIN) 3 MG tablet Take 3 mg by mouth daily. 9/9/16   Yes ProviderDianna MD   docusate sodium (COLACE) 50 MG capsule Take 1 capsule by mouth.       Provider, MD Dianna              Objective      Physical Exam   Constitutional: He is oriented to person, place, and time. He appears well-developed and well-nourished. No distress.   HENT:   Head: Normocephalic and atraumatic.   Nose: Nose normal.   Eyes: Conjunctivae are normal.   Neck: Normal range of motion. No tracheal deviation present. No thyromegaly present.   Cardiovascular: Normal rate and normal heart sounds.    No murmur heard.  Pulmonary/Chest: Effort normal and breath sounds normal. No respiratory distress. He has no wheezes. He has no rales. He exhibits no tenderness.   Abdominal: Soft. He exhibits no distension. There is no tenderness. There is no rebound and no guarding. No hernia.   Musculoskeletal: He exhibits no tenderness or deformity.   Neurological: He is alert and oriented to person, place, and time.   Skin: Skin is warm and dry. No rash noted.   Psychiatric: He has a normal mood and affect. His behavior is normal. Judgment and thought content normal.   Vitals reviewed.             Assessment/Plan    positive cologuard test.  Patient was significant cardiomyopathy though clinically he is improved recently.  We discussed colonoscopy and I think he most likely would be able to tolerate a colonoscopy with sedation.  The real question be whether he would tolerate general anesthesia for any type of surgical operation of his abdomen feeling was found on colonoscopy.  Patient had a family member who did not make it through the procedure and so he is very concerned.  I advised him to talk to his cardiologist about what his risk are as we do not have any data and her system that I can provide any kind of recommendation along those  lines.  He does wish to go and set him up for the colonoscopy which we will and I explained the procedure the prep the alternatives the risk and benefits and he clearly understands and wishes to proceed.  He says he will talk to his cardiologist in [place] about these other issues.        The encounter diagnosis was Positive colorectal cancer screening using Cologuard test.         Patient was cleared by his cardiologist and wishes to proceed with cscope.  Fully discussed procedure, risks and benefits and patient understands and wishes to proceed.  Off coumadin fo 4 days.

## 2018-11-19 LAB
LAB AP CASE REPORT: NORMAL
PATH REPORT.FINAL DX SPEC: NORMAL
PATH REPORT.GROSS SPEC: NORMAL

## 2018-11-26 DIAGNOSIS — E11.9 DIABETES MELLITUS WITHOUT COMPLICATION (HCC): ICD-10-CM

## 2018-11-27 RX ORDER — BLOOD-GLUCOSE METER
EACH MISCELLANEOUS
Qty: 1 KIT | Refills: 0 | Status: SHIPPED | OUTPATIENT
Start: 2018-11-27 | End: 2019-11-11

## 2018-11-29 ENCOUNTER — TELEPHONE (OUTPATIENT)
Dept: SURGERY | Facility: CLINIC | Age: 78
End: 2018-11-29

## 2018-11-29 NOTE — TELEPHONE ENCOUNTER
Spoke with patient by phone regarding his recent colonoscopy.  He had an adequate job with his prep.  As small less than 5 mm adenomatous polyp removed.  I went over these findings with him.  Recommend he have another colonoscopy in 3 years or sooner if he has any other concerns or questions

## 2019-01-23 ENCOUNTER — TELEPHONE (OUTPATIENT)
Dept: FAMILY MEDICINE CLINIC | Facility: CLINIC | Age: 79
End: 2019-01-23

## 2019-01-24 ENCOUNTER — OFFICE VISIT (OUTPATIENT)
Dept: FAMILY MEDICINE CLINIC | Facility: CLINIC | Age: 79
End: 2019-01-24

## 2019-01-24 VITALS
DIASTOLIC BLOOD PRESSURE: 60 MMHG | SYSTOLIC BLOOD PRESSURE: 110 MMHG | BODY MASS INDEX: 27 KG/M2 | WEIGHT: 172 LBS | HEART RATE: 76 BPM | HEIGHT: 67 IN | OXYGEN SATURATION: 94 %

## 2019-01-24 DIAGNOSIS — I42.0 DILATED CARDIOMYOPATHY (HCC): Primary | Chronic | ICD-10-CM

## 2019-01-24 DIAGNOSIS — E11.8 TYPE 2 DIABETES MELLITUS WITH COMPLICATION, WITHOUT LONG-TERM CURRENT USE OF INSULIN (HCC): ICD-10-CM

## 2019-01-24 DIAGNOSIS — I48.20 CHRONIC ATRIAL FIBRILLATION (HCC): ICD-10-CM

## 2019-01-24 DIAGNOSIS — E03.9 ACQUIRED HYPOTHYROIDISM: Chronic | ICD-10-CM

## 2019-01-24 DIAGNOSIS — I10 ESSENTIAL HYPERTENSION: Chronic | ICD-10-CM

## 2019-01-24 PROCEDURE — 99214 OFFICE O/P EST MOD 30 MIN: CPT | Performed by: GENERAL PRACTICE

## 2019-01-24 RX ORDER — GLIMEPIRIDE 4 MG/1
4 TABLET ORAL
Qty: 90 TABLET | Refills: 3 | Status: SHIPPED | OUTPATIENT
Start: 2019-01-24 | End: 2019-11-22 | Stop reason: ALTCHOICE

## 2019-01-24 RX ORDER — DIGOXIN 0.06 MG/1
0.62 TABLET ORAL
COMMUNITY
End: 2019-01-24 | Stop reason: SDUPTHER

## 2019-01-24 RX ORDER — AMIODARONE HYDROCHLORIDE 100 MG/1
100 TABLET ORAL 2 TIMES DAILY
Qty: 180 TABLET | Refills: 3 | Status: SHIPPED | OUTPATIENT
Start: 2019-01-24 | End: 2019-04-25

## 2019-01-24 RX ORDER — FUROSEMIDE 40 MG/1
40 TABLET ORAL DAILY PRN
Qty: 90 TABLET | Refills: 3 | Status: SHIPPED | OUTPATIENT
Start: 2019-01-24 | End: 2022-01-01 | Stop reason: ALTCHOICE

## 2019-01-24 RX ORDER — LEVOTHYROXINE SODIUM 0.05 MG/1
50 TABLET ORAL DAILY
Qty: 90 TABLET | Refills: 3 | Status: SHIPPED | OUTPATIENT
Start: 2019-01-24 | End: 2020-01-23 | Stop reason: SDUPTHER

## 2019-01-24 RX ORDER — CALCITRIOL 0.25 UG/1
0.25 CAPSULE, LIQUID FILLED ORAL DAILY
COMMUNITY
End: 2019-02-07 | Stop reason: SDUPTHER

## 2019-01-24 RX ORDER — OMEPRAZOLE 20 MG/1
20 CAPSULE, DELAYED RELEASE ORAL DAILY
Qty: 90 CAPSULE | Refills: 3 | Status: SHIPPED | OUTPATIENT
Start: 2019-01-24 | End: 2020-01-23 | Stop reason: SDUPTHER

## 2019-01-24 RX ORDER — AMIODARONE HYDROCHLORIDE 200 MG/1
100 TABLET ORAL 2 TIMES DAILY
COMMUNITY
End: 2019-01-24

## 2019-01-24 RX ORDER — CARVEDILOL 6.25 MG/1
6.25 TABLET ORAL 2 TIMES DAILY WITH MEALS
Qty: 180 TABLET | Refills: 3 | Status: SHIPPED | OUTPATIENT
Start: 2019-01-24 | End: 2020-01-23 | Stop reason: SDUPTHER

## 2019-01-24 RX ORDER — TAMSULOSIN HYDROCHLORIDE 0.4 MG/1
1 CAPSULE ORAL NIGHTLY
Qty: 90 CAPSULE | Refills: 3 | Status: SHIPPED | OUTPATIENT
Start: 2019-01-24 | End: 2019-11-11

## 2019-01-24 RX ORDER — SPIRONOLACTONE 25 MG/1
12.5 TABLET ORAL 3 TIMES WEEKLY
Qty: 20 TABLET | Refills: 3 | Status: SHIPPED | OUTPATIENT
Start: 2019-01-25 | End: 2020-01-23 | Stop reason: SDUPTHER

## 2019-01-24 RX ORDER — DIGOXIN 0.06 MG/1
0.62 TABLET ORAL
Qty: 90 TABLET | Refills: 3 | Status: SHIPPED | OUTPATIENT
Start: 2019-01-24 | End: 2020-01-23 | Stop reason: SDUPTHER

## 2019-01-24 RX ORDER — WARFARIN SODIUM 3 MG/1
3 TABLET ORAL
Qty: 90 TABLET | Refills: 3 | Status: SHIPPED | OUTPATIENT
Start: 2019-01-24 | End: 2019-12-13 | Stop reason: SDUPTHER

## 2019-01-24 RX ORDER — PRAVASTATIN SODIUM 40 MG
40 TABLET ORAL NIGHTLY
Qty: 90 TABLET | Refills: 3 | Status: SHIPPED | OUTPATIENT
Start: 2019-01-24 | End: 2020-01-23 | Stop reason: SDUPTHER

## 2019-01-24 RX ORDER — CARVEDILOL 6.25 MG/1
6.25 TABLET ORAL 2 TIMES DAILY WITH MEALS
COMMUNITY
End: 2019-01-24 | Stop reason: SDUPTHER

## 2019-01-24 NOTE — PROGRESS NOTES
Subjective   Laci Pruett is a 78 y.o. male.   Chief Complaint   Patient presents with   • Follow-up     [facility]   • Congestive Heart Failure     History of Present Illness     Recent hospitalization, labs, xrays reviewed and medications reconciled.  Records are reviewed.  Had exacerbation of his congestive heart failure.  Ejection fraction is 5-10%.  He was treated with IV Lasix and had some adjustment in his medications.  He is feeling much better.  He weighs himself daily and knows to start taking Lasix if he gains 2 pounds in a day.  Also is on a low-sodium diet.  His medications have been reconciled and his daughters are filling his pill minder for him.  Home health has not yet been engaged but we will send a referral.     The following portions of the patient's history were reviewed and updated as appropriate: allergies, current medications, past social history and problem list.    Outpatient Medications Prior to Visit   Medication Sig Dispense Refill   • ACCU-CHEK SOFTCLIX LANCETS lancets Test Blood Sugars Daily 100 each 4   • Alcohol Swabs (ALCOHOL PREP) pads Use for testing Blood Sugars at home. 200 each 4   • aspirin 81 MG tablet Take 1 tablet by mouth Daily. 100 tablet 3   • Blood Glucose Calibration (ACCU-CHEK DESMOND) solution Use for testing his home glucometer 3 each 4   • Blood Glucose Monitoring Suppl (ACCU-CHEK DESMOND PLUS) w/Device kit USE AS DIRECTED 1 kit 0   • calcitriol (ROCALTROL) 0.25 MCG capsule Take 0.25 mcg by mouth Daily. Take on Monday Wednesday frildays     • docusate sodium (COLACE) 50 MG capsule Take 1 capsule by mouth.     • folic acid (FOLVITE) 800 MCG tablet Take 1 tablet by mouth Daily. 30 tablet 0   • glucose blood (ACCU-CHEK DESMOND) test strip Test Blood Sugars Once Daily 100 each 3   • nitroglycerin (NITROSTAT) 0.4 MG SL tablet Place 1 tablet under the tongue Every 5 (Five) Minutes As Needed for Chest Pain. Take no more than 3 doses in 15 minutes. 75 tablet 3   • vitamin  B-12 (CYANOCOBALAMIN) 100 MCG tablet Take 50 mcg by mouth Daily.     • amiodarone (PACERONE) 100 MG half tablet Take 100 mg by mouth 2 (Two) Times a Day.     • carvedilol (COREG) 6.25 MG tablet Take 6.25 mg by mouth 2 (Two) Times a Day With Meals.     • Digoxin 62.5 MCG tablet Take 0.625 mcg by mouth Daily.     • furosemide (LASIX) 40 MG tablet Take 40 mg by mouth Daily As Needed.     • glimepiride (AMARYL) 4 MG tablet Take 1 tablet by mouth Every Morning Before Breakfast. 90 tablet 3   • levothyroxine (SYNTHROID) 50 MCG tablet Take 1 tablet by mouth Daily. 90 tablet 3   • lisinopril (PRINIVIL,ZESTRIL) 20 MG tablet Take 1 tablet by mouth Daily. 90 tablet 3   • omeprazole (priLOSEC) 20 MG capsule Take 1 capsule by mouth Daily. 90 capsule 2   • pravastatin (PRAVACHOL) 40 MG tablet TAKE 1 TABLET EVERY NIGHT 90 tablet 2   • sacubitril-valsartan (ENTRESTO) 49-51 MG tablet Take 1 tablet by mouth 2 (Two) Times a Day.     • spironolactone (ALDACTONE) 25 MG tablet TAKE 1/2 TABLET EVERY OTHER DAY 23 tablet 2   • tamsulosin (FLOMAX) 0.4 MG capsule 24 hr capsule Take 2 capsules by mouth Every Night. 60 capsule 0   • warfarin (COUMADIN) 3 MG tablet Take 3 mg by mouth daily.     • amiodarone (PACERONE) 200 MG tablet Take 1 tablet by mouth Daily. 30 tablet 0   • carvedilol (COREG) 12.5 MG tablet Take 0.5 tablets by mouth 2 (Two) Times a Day With Meals. 90 tablet 2   • Melatonin 10 MG tablet Take 10 mg by mouth Every Night.     • Multiple Vitamin (VITAMIN E/FOLIC ACID/B-6/B-12) capsule Take 25 mg/day by mouth.       No facility-administered medications prior to visit.        Review of Systems   Constitutional: Negative.  Negative for chills, fatigue, fever and unexpected weight change.   HENT: Negative.  Negative for congestion, ear pain, hearing loss, nosebleeds, rhinorrhea, sneezing, sore throat and tinnitus.    Eyes: Negative.  Negative for discharge.   Respiratory: Negative.  Negative for cough, shortness of breath and  "wheezing.    Cardiovascular: Negative.  Negative for chest pain and palpitations.   Gastrointestinal: Negative.  Negative for abdominal pain, constipation, diarrhea, nausea and vomiting.   Endocrine: Negative.    Genitourinary: Negative.  Negative for dysuria, frequency and urgency.   Musculoskeletal: Negative.  Negative for arthralgias, back pain, joint swelling, myalgias and neck pain.   Skin: Negative.  Negative for rash.   Allergic/Immunologic: Negative.    Neurological: Negative.  Negative for dizziness, weakness, numbness and headaches.   Hematological: Negative.  Negative for adenopathy.   Psychiatric/Behavioral: Negative.  Negative for dysphoric mood and sleep disturbance. The patient is not nervous/anxious.        Objective   Visit Vitals  /60   Pulse 76   Ht 170.2 cm (67\")   Wt 78 kg (172 lb)   SpO2 94%   BMI 26.94 kg/m²     Physical Exam   Constitutional: He is oriented to person, place, and time. He appears well-developed and well-nourished. No distress.   HENT:   Head: Normocephalic.   Nose: Nose normal.   Mouth/Throat: Oropharynx is clear and moist.   Eyes: Conjunctivae and EOM are normal. Pupils are equal, round, and reactive to light. Right eye exhibits no discharge. Left eye exhibits no discharge.   Neck: No thyromegaly present.   Cardiovascular: Normal rate, regular rhythm, normal heart sounds and intact distal pulses.   No murmur heard.  Pulmonary/Chest: Effort normal and breath sounds normal.   Musculoskeletal: He exhibits no edema.   Lymphadenopathy:     He has no cervical adenopathy.   Neurological: He is alert and oriented to person, place, and time.   Skin: Skin is warm and dry.   Psychiatric: He has a normal mood and affect.   Nursing note and vitals reviewed.      Assessment/Plan   Problem List Items Addressed This Visit        Cardiovascular and Mediastinum    Chronic atrial fibrillation (CMS/HCC) (Chronic)    Relevant Medications    amiodarone (PACERONE) 100 MG tablet    carvedilol " (COREG) 6.25 MG tablet    Digoxin 62.5 MCG tablet    sacubitril-valsartan (ENTRESTO) 49-51 MG tablet    warfarin (COUMADIN) 3 MG tablet    Essential hypertension (Chronic)    Relevant Medications    carvedilol (COREG) 6.25 MG tablet    furosemide (LASIX) 40 MG tablet    spironolactone (ALDACTONE) 25 MG tablet (Start on 1/25/2019)       Endocrine    Acquired hypothyroidism    Relevant Medications    carvedilol (COREG) 6.25 MG tablet    levothyroxine (SYNTHROID) 50 MCG tablet    Type 2 diabetes mellitus with complication, without long-term current use of insulin (CMS/[facility])    Relevant Medications    glimepiride (AMARYL) 4 MG tablet      Other Visit Diagnoses     Dilated cardiomyopathy (CMS/[facility])  (Chronic)   -  Primary    Relevant Medications    carvedilol (COREG) 6.25 MG tablet    Digoxin 62.5 MCG tablet    furosemide (LASIX) 40 MG tablet    sacubitril-valsartan (ENTRESTO) 49-51 MG tablet    spironolactone (ALDACTONE) 25 MG tablet (Start on 1/25/2019)          Continue current treatment.  Follow-up with cardiology and nephrology as scheduled.  Will consult home health for medication management and follow-up of congestive heart failure.  May benefit from some physical therapy also.  New Medications Ordered This Visit   Medications   • amiodarone (PACERONE) 100 MG tablet     Sig: Take 1 tablet by mouth 2 (Two) Times a Day.     Dispense:  180 tablet     Refill:  3   • carvedilol (COREG) 6.25 MG tablet     Sig: Take 1 tablet by mouth 2 (Two) Times a Day With Meals.     Dispense:  180 tablet     Refill:  3   • Digoxin 62.5 MCG tablet     Sig: Take 0.625 mcg by mouth Daily.     Dispense:  90 tablet     Refill:  3   • furosemide (LASIX) 40 MG tablet     Sig: Take 1 tablet by mouth Daily As Needed (for weight gain or swelling).     Dispense:  90 tablet     Refill:  3   • levothyroxine (SYNTHROID) 50 MCG tablet     Sig: Take 1 tablet by mouth Daily.     Dispense:  90 tablet     Refill:  3   • glimepiride (AMARYL) 4 MG tablet      Sig: Take 1 tablet by mouth Every Morning Before Breakfast.     Dispense:  90 tablet     Refill:  3   • omeprazole (priLOSEC) 20 MG capsule     Sig: Take 1 capsule by mouth Daily.     Dispense:  90 capsule     Refill:  3   • pravastatin (PRAVACHOL) 40 MG tablet     Sig: Take 1 tablet by mouth Every Night.     Dispense:  90 tablet     Refill:  3   • sacubitril-valsartan (ENTRESTO) 49-51 MG tablet     Sig: Take 1 tablet by mouth 2 (Two) Times a Day.     Dispense:  180 tablet     Refill:  3   • spironolactone (ALDACTONE) 25 MG tablet     Sig: Take 0.5 tablets by mouth 3 (Three) Times a Week.     Dispense:  20 tablet     Refill:  3   • tamsulosin (FLOMAX) 0.4 MG capsule 24 hr capsule     Sig: Take 1 capsule by mouth Every Night.     Dispense:  90 capsule     Refill:  3   • warfarin (COUMADIN) 3 MG tablet     Sig: Take 1 tablet by mouth Daily. Or as directed     Dispense:  90 tablet     Refill:  3     Return in about 3 months (around 4/24/2019) for Recheck.

## 2019-02-07 ENCOUNTER — TELEPHONE (OUTPATIENT)
Dept: FAMILY MEDICINE CLINIC | Facility: CLINIC | Age: 79
End: 2019-02-07

## 2019-02-07 RX ORDER — CALCITRIOL 0.25 UG/1
CAPSULE, LIQUID FILLED ORAL
Qty: 40 CAPSULE | Refills: 1 | Status: SHIPPED | OUTPATIENT
Start: 2019-02-07 | End: 2019-08-06 | Stop reason: SDUPTHER

## 2019-03-13 ENCOUNTER — TELEPHONE (OUTPATIENT)
Dept: FAMILY MEDICINE CLINIC | Facility: CLINIC | Age: 79
End: 2019-03-13

## 2019-03-13 NOTE — TELEPHONE ENCOUNTER
CAn try melatonin 5 - 10 mg over the counter or tylenol pm. Could also try 1 1/2 tabs of trazodone 50 mg (75mg)

## 2019-03-13 NOTE — TELEPHONE ENCOUNTER
Patient called to see if Dr Barnett would be willing to give him something to help him sleep. He states he did take a Trazodone 50 mg and it did not help. Please call him at [phone]

## 2019-04-08 ENCOUNTER — TRANSCRIBE ORDERS (OUTPATIENT)
Dept: LAB | Facility: HOSPITAL | Age: 79
End: 2019-04-08

## 2019-04-08 DIAGNOSIS — N18.30 CHRONIC RENAL DISEASE, STAGE III (HCC): Primary | ICD-10-CM

## 2019-04-09 ENCOUNTER — TRANSCRIBE ORDERS (OUTPATIENT)
Dept: GENERAL RADIOLOGY | Facility: CLINIC | Age: 79
End: 2019-04-09

## 2019-04-09 DIAGNOSIS — N18.30 CHRONIC KIDNEY DISEASE, STAGE III (MODERATE) (HCC): Primary | ICD-10-CM

## 2019-04-10 ENCOUNTER — LAB (OUTPATIENT)
Dept: LAB | Facility: OTHER | Age: 79
End: 2019-04-10

## 2019-04-10 DIAGNOSIS — N18.30 CHRONIC RENAL DISEASE, STAGE III (HCC): ICD-10-CM

## 2019-04-10 DIAGNOSIS — E03.9 ACQUIRED HYPOTHYROIDISM: Chronic | ICD-10-CM

## 2019-04-10 DIAGNOSIS — N18.30 CHRONIC KIDNEY DISEASE, STAGE III (MODERATE) (HCC): ICD-10-CM

## 2019-04-10 LAB
ALBUMIN SERPL-MCNC: 3.8 G/DL (ref 3.5–5)
ANION GAP SERPL CALCULATED.3IONS-SCNC: 4 MMOL/L (ref 5–15)
BUN BLD-MCNC: 30 MG/DL (ref 7–23)
BUN/CREAT SERPL: 19.9 (ref 7–25)
CALCIUM SPEC-SCNC: 9.1 MG/DL (ref 8.4–10.2)
CHLORIDE SERPL-SCNC: 103 MMOL/L (ref 101–112)
CO2 SERPL-SCNC: 33 MMOL/L (ref 22–30)
CREAT BLD-MCNC: 1.51 MG/DL (ref 0.7–1.3)
GFR SERPL CREATININE-BSD FRML MDRD: 45 ML/MIN/1.73 (ref 42–98)
GLUCOSE BLD-MCNC: 253 MG/DL (ref 70–99)
PHOSPHATE SERPL-MCNC: 3.1 MG/DL (ref 2.5–4.5)
POTASSIUM BLD-SCNC: 4.8 MMOL/L (ref 3.4–5)
SODIUM BLD-SCNC: 140 MMOL/L (ref 137–145)

## 2019-04-10 PROCEDURE — 80069 RENAL FUNCTION PANEL: CPT | Performed by: INTERNAL MEDICINE

## 2019-04-10 PROCEDURE — 83970 ASSAY OF PARATHORMONE: CPT | Performed by: INTERNAL MEDICINE

## 2019-04-10 PROCEDURE — 84439 ASSAY OF FREE THYROXINE: CPT | Performed by: GENERAL PRACTICE

## 2019-04-10 PROCEDURE — 82306 VITAMIN D 25 HYDROXY: CPT | Performed by: INTERNAL MEDICINE

## 2019-04-10 PROCEDURE — 84443 ASSAY THYROID STIM HORMONE: CPT | Performed by: GENERAL PRACTICE

## 2019-04-10 PROCEDURE — 82570 ASSAY OF URINE CREATININE: CPT | Performed by: INTERNAL MEDICINE

## 2019-04-10 PROCEDURE — 36415 COLL VENOUS BLD VENIPUNCTURE: CPT | Performed by: INTERNAL MEDICINE

## 2019-04-10 PROCEDURE — 84156 ASSAY OF PROTEIN URINE: CPT | Performed by: INTERNAL MEDICINE

## 2019-04-11 LAB
25(OH)D3 SERPL-MCNC: 20.9 NG/ML (ref 30–100)
CREAT UR-MCNC: 92.8 MG/DL
PROT UR-MCNC: 9 MG/DL
PROT/CREAT UR: 97 MG/G CREA (ref 0–200)
PTH-INTACT SERPL-MCNC: 78.7 PG/ML (ref 15–65)
T4 FREE SERPL-MCNC: 1.18 NG/DL (ref 0.93–1.7)
TSH SERPL DL<=0.05 MIU/L-ACNC: 4.92 MIU/ML (ref 0.27–4.2)

## 2019-04-25 ENCOUNTER — APPOINTMENT (OUTPATIENT)
Dept: LAB | Facility: HOSPITAL | Age: 79
End: 2019-04-25

## 2019-04-25 ENCOUNTER — OFFICE VISIT (OUTPATIENT)
Dept: FAMILY MEDICINE CLINIC | Facility: CLINIC | Age: 79
End: 2019-04-25

## 2019-04-25 VITALS
HEIGHT: 67 IN | DIASTOLIC BLOOD PRESSURE: 60 MMHG | BODY MASS INDEX: 27 KG/M2 | OXYGEN SATURATION: 96 % | WEIGHT: 172 LBS | HEART RATE: 73 BPM | SYSTOLIC BLOOD PRESSURE: 104 MMHG

## 2019-04-25 DIAGNOSIS — E03.9 ACQUIRED HYPOTHYROIDISM: Chronic | ICD-10-CM

## 2019-04-25 DIAGNOSIS — E78.2 MIXED HYPERLIPIDEMIA: Chronic | ICD-10-CM

## 2019-04-25 DIAGNOSIS — I10 ESSENTIAL HYPERTENSION: Chronic | ICD-10-CM

## 2019-04-25 DIAGNOSIS — E11.8 TYPE 2 DIABETES MELLITUS WITH COMPLICATION, WITHOUT LONG-TERM CURRENT USE OF INSULIN (HCC): Primary | ICD-10-CM

## 2019-04-25 LAB
ANION GAP SERPL CALCULATED.3IONS-SCNC: 9.5 MMOL/L
ARTICHOKE IGE QN: 97 MG/DL (ref 0–100)
BUN BLD-MCNC: 25 MG/DL (ref 8–23)
BUN/CREAT SERPL: 17 (ref 7–25)
CALCIUM SPEC-SCNC: 8.7 MG/DL (ref 8.6–10.5)
CHLORIDE SERPL-SCNC: 101 MMOL/L (ref 98–107)
CO2 SERPL-SCNC: 30.5 MMOL/L (ref 22–29)
CREAT BLD-MCNC: 1.47 MG/DL (ref 0.76–1.27)
GFR SERPL CREATININE-BSD FRML MDRD: 46 ML/MIN/1.73
GLUCOSE BLD-MCNC: 202 MG/DL (ref 65–99)
HBA1C MFR BLD: 7.5 % (ref 4.8–5.6)
POTASSIUM BLD-SCNC: 4.7 MMOL/L (ref 3.5–5.2)
SODIUM BLD-SCNC: 141 MMOL/L (ref 136–145)

## 2019-04-25 PROCEDURE — 83721 ASSAY OF BLOOD LIPOPROTEIN: CPT | Performed by: GENERAL PRACTICE

## 2019-04-25 PROCEDURE — 83036 HEMOGLOBIN GLYCOSYLATED A1C: CPT | Performed by: GENERAL PRACTICE

## 2019-04-25 PROCEDURE — 84156 ASSAY OF PROTEIN URINE: CPT | Performed by: INTERNAL MEDICINE

## 2019-04-25 PROCEDURE — 82570 ASSAY OF URINE CREATININE: CPT | Performed by: INTERNAL MEDICINE

## 2019-04-25 PROCEDURE — 80048 BASIC METABOLIC PNL TOTAL CA: CPT | Performed by: GENERAL PRACTICE

## 2019-04-25 PROCEDURE — 36415 COLL VENOUS BLD VENIPUNCTURE: CPT | Performed by: GENERAL PRACTICE

## 2019-04-25 PROCEDURE — 99214 OFFICE O/P EST MOD 30 MIN: CPT | Performed by: GENERAL PRACTICE

## 2019-04-25 RX ORDER — MELATONIN
1000 DAILY
COMMUNITY

## 2019-04-25 NOTE — PROGRESS NOTES
Subjective   Laci Pruett is a 79 y.o. male.   Chief Complaint   Patient presents with   • Diabetes     has papers for diabetic foot ware     For review and evaluation of management of chronic medical problems. Labs pending.   Diabetes   He presents for his follow-up diabetic visit. He has type 2 diabetes mellitus. His disease course has been stable. Pertinent negatives for hypoglycemia include no dizziness, headaches or nervousness/anxiousness. There are no diabetic associated symptoms. Pertinent negatives for diabetes include no chest pain, no fatigue and no weakness. There are no hypoglycemic complications. Symptoms are stable. Diabetic complications include nephropathy. Risk factors for coronary artery disease include diabetes mellitus, dyslipidemia and hypertension. Current diabetic treatment includes oral agent (monotherapy). He is compliant with treatment most of the time. His weight is stable. He is following a generally healthy diet. When asked about meal planning, he reported none. He has not had a previous visit with a dietitian. He never participates in exercise. Home blood sugar record trend: does not check regularly. An ACE inhibitor/angiotensin II receptor blocker is being taken. Eye exam is current (Dr. Anderson).   Hyperlipidemia   This is a chronic problem. The current episode started more than 1 year ago. The problem is controlled. Recent lipid tests were reviewed and are normal. Exacerbating diseases include diabetes and hypothyroidism. Pertinent negatives include no chest pain, myalgias or shortness of breath. Current antihyperlipidemic treatment includes statins. The current treatment provides significant improvement of lipids. There are no compliance problems.    Hypertension   This is a chronic problem. The current episode started more than 1 year ago. The problem has been gradually improving since onset. The problem is controlled. Associated symptoms include anxiety. Pertinent negatives  include no chest pain, headaches, neck pain, palpitations or shortness of breath. There are no associated agents to hypertension. Risk factors for coronary artery disease include diabetes mellitus, dyslipidemia, male gender and stress. Current antihypertension treatment includes calcium channel blockers and ACE inhibitors. The current treatment provides significant improvement. There are no compliance problems.    Hypothyroidism   This is a chronic problem. The current episode started more than 1 year ago. The problem occurs constantly. The problem has been unchanged. Pertinent negatives include no abdominal pain, arthralgias, chest pain, chills, congestion, coughing, fatigue, fever, headaches, joint swelling, myalgias, nausea, neck pain, numbness, rash, sore throat, vomiting or weakness. Nothing aggravates the symptoms. Treatments tried: levothyroxine. The treatment provided significant relief.      The following portions of the patient's history were reviewed and updated as appropriate: allergies, current medications, past social history and problem list.    Outpatient Medications Prior to Visit   Medication Sig Dispense Refill   • ACCU-CHEK SOFTCLIX LANCETS lancets Test Blood Sugars Daily 100 each 4   • Alcohol Swabs (ALCOHOL PREP) pads Use for testing Blood Sugars at home. 200 each 4   • aspirin 81 MG tablet Take 1 tablet by mouth Daily. 100 tablet 3   • Blood Glucose Calibration (ACCU-CHEK DESMOND) solution Use for testing his home glucometer 3 each 4   • calcitriol (ROCALTROL) 0.25 MCG capsule Take 1 tablet on Monday, Wednesday & Friday. 40 capsule 1   • cholecalciferol (VITAMIN D3) 1000 units tablet Take 1,000 Units by mouth Daily.     • Digoxin 62.5 MCG tablet Take 0.625 mcg by mouth Daily. 90 tablet 3   • docusate sodium (COLACE) 50 MG capsule Take 1 capsule by mouth.     • folic acid (FOLVITE) 800 MCG tablet Take 1 tablet by mouth Daily. 30 tablet 0   • furosemide (LASIX) 40 MG tablet Take 1 tablet by mouth  Daily As Needed (for weight gain or swelling). 90 tablet 3   • glimepiride (AMARYL) 4 MG tablet Take 1 tablet by mouth Every Morning Before Breakfast. 90 tablet 3   • glucose blood (ACCU-CHEK DESMOND) test strip Test Blood Sugars Once Daily 100 each 3   • levothyroxine (SYNTHROID) 50 MCG tablet Take 1 tablet by mouth Daily. 90 tablet 3   • nitroglycerin (NITROSTAT) 0.4 MG SL tablet Place 1 tablet under the tongue Every 5 (Five) Minutes As Needed for Chest Pain. Take no more than 3 doses in 15 minutes. 75 tablet 3   • omeprazole (priLOSEC) 20 MG capsule Take 1 capsule by mouth Daily. 90 capsule 3   • pravastatin (PRAVACHOL) 40 MG tablet Take 1 tablet by mouth Every Night. 90 tablet 3   • sacubitril-valsartan (ENTRESTO) 49-51 MG tablet Take 1 tablet by mouth 2 (Two) Times a Day. 180 tablet 3   • spironolactone (ALDACTONE) 25 MG tablet Take 0.5 tablets by mouth 3 (Three) Times a Week. 20 tablet 3   • tamsulosin (FLOMAX) 0.4 MG capsule 24 hr capsule Take 1 capsule by mouth Every Night. 90 capsule 3   • vitamin B-12 (CYANOCOBALAMIN) 100 MCG tablet Take 50 mcg by mouth Daily.     • warfarin (COUMADIN) 3 MG tablet Take 1 tablet by mouth Daily. Or as directed 90 tablet 3   • Blood Glucose Monitoring Suppl (ACCU-CHEK DESMOND PLUS) w/Device kit USE AS DIRECTED 1 kit 0   • carvedilol (COREG) 6.25 MG tablet Take 1 tablet by mouth 2 (Two) Times a Day With Meals. (Patient taking differently: Take 12.5 mg by mouth 2 (Two) Times a Day With Meals.) 180 tablet 3   • amiodarone (PACERONE) 100 MG tablet Take 1 tablet by mouth 2 (Two) Times a Day. 180 tablet 3     No facility-administered medications prior to visit.        Review of Systems   Constitutional: Negative.  Negative for chills, fatigue, fever and unexpected weight change.   HENT: Negative.  Negative for congestion, ear pain, hearing loss, nosebleeds, rhinorrhea, sneezing, sore throat and tinnitus.    Eyes: Negative.  Negative for discharge.   Respiratory: Negative.  Negative for  "cough, shortness of breath and wheezing.    Cardiovascular: Negative.  Negative for chest pain and palpitations.   Gastrointestinal: Negative.  Negative for abdominal pain, constipation, diarrhea, nausea and vomiting.   Endocrine: Negative.    Genitourinary: Negative.  Negative for dysuria, frequency and urgency.   Musculoskeletal: Negative.  Negative for arthralgias, back pain, joint swelling, myalgias and neck pain.   Skin: Negative.  Negative for rash.   Allergic/Immunologic: Negative.    Neurological: Negative.  Negative for dizziness, weakness, numbness and headaches.   Hematological: Negative.  Negative for adenopathy.   Psychiatric/Behavioral: Negative.  Negative for dysphoric mood and sleep disturbance. The patient is not nervous/anxious.        Objective   Visit Vitals  /60   Pulse 73   Ht 170.2 cm (67\")   Wt 78 kg (172 lb)   SpO2 96%   BMI 26.94 kg/m²     Physical Exam   Constitutional: He is oriented to person, place, and time. He appears well-developed and well-nourished. No distress.   HENT:   Head: Normocephalic.   Nose: Nose normal.   Mouth/Throat: Oropharynx is clear and moist.   Eyes: Conjunctivae and EOM are normal. Pupils are equal, round, and reactive to light. Right eye exhibits no discharge. Left eye exhibits no discharge.   Neck: No thyromegaly present.   Cardiovascular: Normal rate, regular rhythm, normal heart sounds and intact distal pulses.   No murmur heard.  Pulmonary/Chest: Effort normal and breath sounds normal.   Musculoskeletal: He exhibits no edema.    Laci had a diabetic foot exam performed today.   During the foot exam he had a monofilament test performed (normal).  Vascular Status -  His right foot exhibits normal foot vasculature  and no edema. His left foot exhibits normal foot vasculature  and no edema.  Skin Integrity  -  His right foot skin is intact. He has callous right foot.His left foot skin is intact.He has callous left foot..   Foot Structure and Biomechanics -  " His right foot exhibits hallux valgus.  Lymphadenopathy:     He has no cervical adenopathy.   Neurological: He is alert and oriented to person, place, and time.   Skin: Skin is warm and dry.   Psychiatric: He has a normal mood and affect.   Nursing note and vitals reviewed.    Assessment/Plan   Problem List Items Addressed This Visit        Cardiovascular and Mediastinum    Essential hypertension (Chronic)    Mixed hyperlipidemia (Chronic)       Endocrine    Acquired hypothyroidism (Chronic)    Type 2 diabetes mellitus with complication, without long-term current use of insulin (CMS/[facility]) - Primary (Chronic)    Relevant Orders    Hemoglobin A1c    LDL Cholesterol, Direct    Basic Metabolic Panel    Comprehensive Metabolic Panel    Hemoglobin A1c    LDL Cholesterol, Direct          Will notify regarding results. Continue current treatment.     No orders of the defined types were placed in this encounter.    Return in about 6 months (around 10/25/2019) for Recheck, medicare wellness visit.

## 2019-04-26 LAB
CREAT UR-MCNC: 73.9 MG/DL
PROT UR-MCNC: 5 MG/DL
PROT/CREAT UR: 67.7 MG/G CREA (ref 0–200)

## 2019-04-29 ENCOUNTER — LAB (OUTPATIENT)
Dept: LAB | Facility: OTHER | Age: 79
End: 2019-04-29

## 2019-04-29 ENCOUNTER — TRANSCRIBE ORDERS (OUTPATIENT)
Dept: GENERAL RADIOLOGY | Facility: CLINIC | Age: 79
End: 2019-04-29

## 2019-04-29 DIAGNOSIS — I48.20 CHRONIC ATRIAL FIBRILLATION (HCC): ICD-10-CM

## 2019-04-29 DIAGNOSIS — I48.20 CHRONIC ATRIAL FIBRILLATION (HCC): Primary | ICD-10-CM

## 2019-04-29 PROCEDURE — 36415 COLL VENOUS BLD VENIPUNCTURE: CPT | Performed by: INTERNAL MEDICINE

## 2019-04-29 PROCEDURE — 85610 PROTHROMBIN TIME: CPT | Performed by: INTERNAL MEDICINE

## 2019-04-30 LAB
INR PPP: 1.88 (ref 0.8–1.2)
PROTHROMBIN TIME: 20.9 SECONDS (ref 11.1–15.3)

## 2019-05-01 ENCOUNTER — TRANSCRIBE ORDERS (OUTPATIENT)
Dept: GENERAL RADIOLOGY | Facility: CLINIC | Age: 79
End: 2019-05-01

## 2019-05-01 DIAGNOSIS — I48.20 CHRONIC ATRIAL FIBRILLATION (HCC): Primary | ICD-10-CM

## 2019-05-03 ENCOUNTER — TELEPHONE (OUTPATIENT)
Dept: FAMILY MEDICINE CLINIC | Facility: CLINIC | Age: 79
End: 2019-05-03

## 2019-05-03 NOTE — TELEPHONE ENCOUNTER
Per Dr. Barnett, Mr. Pruett has been called with recent lab results & recommendations.  Continue current medications and follow-up as planned or sooner if any problems.    He will be seeing Dr. Mckay either the last of August or the first of September  He will be having labs before he sees Dr. Mckay.      ----- Message from Jade Barnett MD sent at 5/1/2019  2:20 PM CDT -----  Call and tell labs are stable, make sure he follows up with his nephrologist as scheduled.

## 2019-08-06 RX ORDER — CALCITRIOL 0.25 UG/1
CAPSULE, LIQUID FILLED ORAL
Qty: 36 CAPSULE | Refills: 2 | Status: SHIPPED | OUTPATIENT
Start: 2019-08-06 | End: 2020-01-23 | Stop reason: SDUPTHER

## 2019-08-13 ENCOUNTER — TELEPHONE (OUTPATIENT)
Dept: FAMILY MEDICINE CLINIC | Facility: CLINIC | Age: 79
End: 2019-08-13

## 2019-08-13 NOTE — TELEPHONE ENCOUNTER
Gisele from [facility] left a message that Laci' daughter found out yesterday that he has been taking Digoxin, a whole pill once a day instead of half a pill.  She said the daughter saw no side effects but they want to confirm, is he to take a whole pill or a half?     [phone]

## 2019-08-14 ENCOUNTER — OFFICE VISIT (OUTPATIENT)
Dept: FAMILY MEDICINE CLINIC | Facility: CLINIC | Age: 79
End: 2019-08-14

## 2019-08-14 ENCOUNTER — LAB (OUTPATIENT)
Dept: LAB | Facility: HOSPITAL | Age: 79
End: 2019-08-14

## 2019-08-14 VITALS
DIASTOLIC BLOOD PRESSURE: 78 MMHG | WEIGHT: 171.9 LBS | OXYGEN SATURATION: 95 % | BODY MASS INDEX: 26.98 KG/M2 | HEART RATE: 69 BPM | HEIGHT: 67 IN | SYSTOLIC BLOOD PRESSURE: 140 MMHG

## 2019-08-14 DIAGNOSIS — I48.20 CHRONIC ATRIAL FIBRILLATION (HCC): ICD-10-CM

## 2019-08-14 DIAGNOSIS — E11.8 TYPE 2 DIABETES MELLITUS WITH COMPLICATION, WITHOUT LONG-TERM CURRENT USE OF INSULIN (HCC): Primary | ICD-10-CM

## 2019-08-14 DIAGNOSIS — E78.2 MIXED HYPERLIPIDEMIA: Chronic | ICD-10-CM

## 2019-08-14 DIAGNOSIS — E03.9 ACQUIRED HYPOTHYROIDISM: Chronic | ICD-10-CM

## 2019-08-14 DIAGNOSIS — I10 ESSENTIAL HYPERTENSION: Chronic | ICD-10-CM

## 2019-08-14 LAB
ALBUMIN SERPL-MCNC: 4 G/DL (ref 3.5–5.2)
ALBUMIN/GLOB SERPL: 1.2 G/DL
ALP SERPL-CCNC: 60 U/L (ref 39–117)
ALT SERPL W P-5'-P-CCNC: 17 U/L (ref 1–41)
ANION GAP SERPL CALCULATED.3IONS-SCNC: 10.1 MMOL/L (ref 5–15)
AST SERPL-CCNC: 22 U/L (ref 1–40)
BASOPHILS # BLD AUTO: 0.03 10*3/MM3 (ref 0–0.2)
BASOPHILS NFR BLD AUTO: 0.5 % (ref 0–1.5)
BILIRUB SERPL-MCNC: 0.3 MG/DL (ref 0.2–1.2)
BUN BLD-MCNC: 29 MG/DL (ref 8–23)
BUN/CREAT SERPL: 18.6 (ref 7–25)
CALCIUM SPEC-SCNC: 8.6 MG/DL (ref 8.6–10.5)
CHLORIDE SERPL-SCNC: 103 MMOL/L (ref 98–107)
CO2 SERPL-SCNC: 25.9 MMOL/L (ref 22–29)
CREAT BLD-MCNC: 1.56 MG/DL (ref 0.76–1.27)
DEPRECATED RDW RBC AUTO: 55.4 FL (ref 37–54)
DIGOXIN SERPL-MCNC: 1.5 NG/ML (ref 0.6–1.2)
EOSINOPHIL # BLD AUTO: 0.19 10*3/MM3 (ref 0–0.4)
EOSINOPHIL NFR BLD AUTO: 3.4 % (ref 0.3–6.2)
ERYTHROCYTE [DISTWIDTH] IN BLOOD BY AUTOMATED COUNT: 17.4 % (ref 12.3–15.4)
GFR SERPL CREATININE-BSD FRML MDRD: 43 ML/MIN/1.73
GLOBULIN UR ELPH-MCNC: 3.3 GM/DL
GLUCOSE BLD-MCNC: 243 MG/DL (ref 65–99)
HBA1C MFR BLD: 8.2 % (ref 4.8–5.6)
HCT VFR BLD AUTO: 39.2 % (ref 37.5–51)
HGB BLD-MCNC: 11.9 G/DL (ref 13–17.7)
IMM GRANULOCYTES # BLD AUTO: 0.01 10*3/MM3 (ref 0–0.05)
IMM GRANULOCYTES NFR BLD AUTO: 0.2 % (ref 0–0.5)
LYMPHOCYTES # BLD AUTO: 1.41 10*3/MM3 (ref 0.7–3.1)
LYMPHOCYTES NFR BLD AUTO: 25 % (ref 19.6–45.3)
MCH RBC QN AUTO: 26.7 PG (ref 26.6–33)
MCHC RBC AUTO-ENTMCNC: 30.4 G/DL (ref 31.5–35.7)
MCV RBC AUTO: 88.1 FL (ref 79–97)
MONOCYTES # BLD AUTO: 0.45 10*3/MM3 (ref 0.1–0.9)
MONOCYTES NFR BLD AUTO: 8 % (ref 5–12)
NEUTROPHILS # BLD AUTO: 3.55 10*3/MM3 (ref 1.7–7)
NEUTROPHILS NFR BLD AUTO: 62.9 % (ref 42.7–76)
NRBC BLD AUTO-RTO: 0 /100 WBC (ref 0–0.2)
PLATELET # BLD AUTO: 135 10*3/MM3 (ref 140–450)
PMV BLD AUTO: 12.1 FL (ref 6–12)
POTASSIUM BLD-SCNC: 5.2 MMOL/L (ref 3.5–5.2)
PROT SERPL-MCNC: 7.3 G/DL (ref 6–8.5)
RBC # BLD AUTO: 4.45 10*6/MM3 (ref 4.14–5.8)
SODIUM BLD-SCNC: 139 MMOL/L (ref 136–145)
WBC NRBC COR # BLD: 5.64 10*3/MM3 (ref 3.4–10.8)

## 2019-08-14 PROCEDURE — 80162 ASSAY OF DIGOXIN TOTAL: CPT

## 2019-08-14 PROCEDURE — 80053 COMPREHEN METABOLIC PANEL: CPT | Performed by: GENERAL PRACTICE

## 2019-08-14 PROCEDURE — 36415 COLL VENOUS BLD VENIPUNCTURE: CPT | Performed by: GENERAL PRACTICE

## 2019-08-14 PROCEDURE — 83036 HEMOGLOBIN GLYCOSYLATED A1C: CPT | Performed by: GENERAL PRACTICE

## 2019-08-14 PROCEDURE — 85025 COMPLETE CBC W/AUTO DIFF WBC: CPT | Performed by: GENERAL PRACTICE

## 2019-08-14 PROCEDURE — 99214 OFFICE O/P EST MOD 30 MIN: CPT | Performed by: GENERAL PRACTICE

## 2019-08-14 NOTE — PROGRESS NOTES
Subjective   Laci Pruett is a 79 y.o. male.   Chief Complaint   Patient presents with   • Cyst     pain   • Diabetes   • Hypertension   • Hyperlipidemia   • Hypothyroidism   • Atrial Fibrillation     For review and evaluation of management of chronic medical problems. Labs pending.   Diabetes   He presents for his follow-up diabetic visit. He has type 2 diabetes mellitus. His disease course has been stable. Pertinent negatives for hypoglycemia include no dizziness, headaches or nervousness/anxiousness. There are no diabetic associated symptoms. Pertinent negatives for diabetes include no chest pain, no fatigue and no weakness. There are no hypoglycemic complications. Symptoms are stable. Diabetic complications include nephropathy. Risk factors for coronary artery disease include diabetes mellitus, dyslipidemia and hypertension. Current diabetic treatment includes oral agent (monotherapy). He is compliant with treatment most of the time. His weight is stable. He is following a generally healthy diet. When asked about meal planning, he reported none. He has not had a previous visit with a dietitian. He never participates in exercise. Home blood sugar record trend: does not check regularly. An ACE inhibitor/angiotensin II receptor blocker is being taken. Eye exam is current (Dr. Anderson).   Hyperlipidemia   This is a chronic problem. The current episode started more than 1 year ago. The problem is controlled. Recent lipid tests were reviewed and are normal. Exacerbating diseases include diabetes and hypothyroidism. Pertinent negatives include no chest pain, myalgias or shortness of breath. Current antihyperlipidemic treatment includes statins. The current treatment provides significant improvement of lipids. There are no compliance problems.    Hypertension   This is a chronic problem. The current episode started more than 1 year ago. The problem has been gradually improving since onset. The problem is controlled.  Associated symptoms include anxiety. Pertinent negatives include no chest pain, headaches, neck pain, palpitations or shortness of breath. There are no associated agents to hypertension. Risk factors for coronary artery disease include diabetes mellitus, dyslipidemia, male gender and stress. Current antihypertension treatment includes calcium channel blockers and ACE inhibitors. The current treatment provides significant improvement. There are no compliance problems.    Hypothyroidism   This is a chronic problem. The current episode started more than 1 year ago. The problem occurs constantly. The problem has been unchanged. Pertinent negatives include no abdominal pain, arthralgias, chest pain, chills, congestion, coughing, fatigue, fever, headaches, joint swelling, myalgias, nausea, neck pain, numbness, rash, sore throat, vomiting or weakness. Nothing aggravates the symptoms. Treatments tried: levothyroxine. The treatment provided significant relief.   Atrial Fibrillation   Presents for follow-up visit. Symptoms are negative for bradycardia, chest pain, dizziness, hypertension, hypotension, palpitations, shortness of breath, syncope and weakness. The symptoms have been stable. Past medical history includes atrial fibrillation and hyperlipidemia. There are no medication compliance problems.      Noticed a lump on his anterior chest wall a week ago which has become somewhat tender on palpation.  Denies any fever or chills associated with this. No aggravating or alleviating factors.     The following portions of the patient's history were reviewed and updated as appropriate: allergies, current medications, past social history and problem list.    Outpatient Medications Prior to Visit   Medication Sig Dispense Refill   • ACCU-CHEK SOFTCLIX LANCETS lancets Test Blood Sugars Daily 100 each 4   • Alcohol Swabs (ALCOHOL PREP) pads Use for testing Blood Sugars at home. 200 each 4   • aspirin 81 MG tablet Take 1 tablet by  mouth Daily. 100 tablet 3   • Blood Glucose Calibration (ACCU-CHEK DESMOND) solution Use for testing his home glucometer 3 each 4   • Blood Glucose Monitoring Suppl (ACCU-CHEK DESMOND PLUS) w/Device kit USE AS DIRECTED 1 kit 0   • calcitriol (ROCALTROL) 0.25 MCG capsule Take 1 tablet on Monday, Wednesday & Friday. 36 capsule 2   • carvedilol (COREG) 6.25 MG tablet Take 1 tablet by mouth 2 (Two) Times a Day With Meals. (Patient taking differently: Take 12.5 mg by mouth 2 (Two) Times a Day With Meals.) 180 tablet 3   • cholecalciferol (VITAMIN D3) 1000 units tablet Take 1,000 Units by mouth Daily.     • Digoxin 62.5 MCG tablet Take 0.625 mcg by mouth Daily. 90 tablet 3   • docusate sodium (COLACE) 50 MG capsule Take 1 capsule by mouth.     • folic acid (FOLVITE) 800 MCG tablet Take 1 tablet by mouth Daily. 30 tablet 0   • furosemide (LASIX) 40 MG tablet Take 1 tablet by mouth Daily As Needed (for weight gain or swelling). 90 tablet 3   • glimepiride (AMARYL) 4 MG tablet Take 1 tablet by mouth Every Morning Before Breakfast. 90 tablet 3   • glucose blood (ACCU-CHEK DESMOND) test strip Test Blood Sugars Once Daily 100 each 3   • levothyroxine (SYNTHROID) 50 MCG tablet Take 1 tablet by mouth Daily. 90 tablet 3   • nitroglycerin (NITROSTAT) 0.4 MG SL tablet Place 1 tablet under the tongue Every 5 (Five) Minutes As Needed for Chest Pain. Take no more than 3 doses in 15 minutes. 75 tablet 3   • omeprazole (priLOSEC) 20 MG capsule Take 1 capsule by mouth Daily. 90 capsule 3   • pravastatin (PRAVACHOL) 40 MG tablet Take 1 tablet by mouth Every Night. 90 tablet 3   • sacubitril-valsartan (ENTRESTO) 49-51 MG tablet Take 1 tablet by mouth 2 (Two) Times a Day. 180 tablet 3   • spironolactone (ALDACTONE) 25 MG tablet Take 0.5 tablets by mouth 3 (Three) Times a Week. 20 tablet 3   • tamsulosin (FLOMAX) 0.4 MG capsule 24 hr capsule Take 1 capsule by mouth Every Night. 90 capsule 3   • vitamin B-12 (CYANOCOBALAMIN) 100 MCG tablet Take 50  "mcg by mouth Daily.     • warfarin (COUMADIN) 3 MG tablet Take 1 tablet by mouth Daily. Or as directed 90 tablet 3     No facility-administered medications prior to visit.        Review of Systems   Constitutional: Negative.  Negative for chills, fatigue, fever and unexpected weight change.   HENT: Negative.  Negative for congestion, ear pain, hearing loss, nosebleeds, rhinorrhea, sneezing, sore throat and tinnitus.    Eyes: Negative.  Negative for discharge.   Respiratory: Negative.  Negative for cough, shortness of breath and wheezing.    Cardiovascular: Negative.  Negative for chest pain, palpitations and syncope.   Gastrointestinal: Negative.  Negative for abdominal pain, constipation, diarrhea, nausea and vomiting.   Endocrine: Negative.    Genitourinary: Negative.  Negative for dysuria, frequency and urgency.   Musculoskeletal: Negative.  Negative for arthralgias, back pain, joint swelling, myalgias and neck pain.   Skin: Negative.  Negative for rash.   Allergic/Immunologic: Negative.    Neurological: Negative.  Negative for dizziness, weakness, numbness and headaches.   Hematological: Negative.  Negative for adenopathy.   Psychiatric/Behavioral: Negative.  Negative for dysphoric mood and sleep disturbance. The patient is not nervous/anxious.        Objective   Visit Vitals  /78 (BP Location: Left arm)   Pulse 69   Ht 170.2 cm (67\")   Wt 78 kg (171 lb 14.4 oz)   SpO2 95%   BMI 26.92 kg/m²     Physical Exam   Constitutional: He is oriented to person, place, and time. He appears well-developed and well-nourished. No distress.   HENT:   Head: Normocephalic.   Nose: Nose normal.   Mouth/Throat: Oropharynx is clear and moist.   Eyes: Conjunctivae and EOM are normal. Pupils are equal, round, and reactive to light. Right eye exhibits no discharge. Left eye exhibits no discharge.   Neck: No thyromegaly present.   Cardiovascular: Normal rate, regular rhythm, normal heart sounds and intact distal pulses.   No " murmur heard.  Pulmonary/Chest: Effort normal and breath sounds normal.       Musculoskeletal: He exhibits no edema.   Lymphadenopathy:     He has no cervical adenopathy.   Neurological: He is alert and oriented to person, place, and time.   Skin: Skin is warm and dry.   Psychiatric: He has a normal mood and affect.   Nursing note and vitals reviewed.      Assessment/Plan   Problem List Items Addressed This Visit        Cardiovascular and Mediastinum    Chronic atrial fibrillation (CMS/HCC) (Chronic)    Relevant Orders    Digoxin Level (Completed)    CBC & Differential (Completed)    Comprehensive Metabolic Panel (Completed)    Hemoglobin A1c (Completed)    CBC Auto Differential (Completed)    Essential hypertension (Chronic)    Mixed hyperlipidemia (Chronic)       Endocrine    Acquired hypothyroidism (Chronic)    Type 2 diabetes mellitus with complication, without long-term current use of insulin (CMS/HCC) - Primary (Chronic)    Relevant Orders    CBC & Differential (Completed)    Comprehensive Metabolic Panel (Completed)    Hemoglobin A1c (Completed)    CBC Auto Differential (Completed)          Will notify regarding results. Reassured.    No orders of the defined types were placed in this encounter.    Return if symptoms worsen or fail to improve, for Next scheduled follow up.

## 2019-08-21 ENCOUNTER — TELEPHONE (OUTPATIENT)
Dept: FAMILY MEDICINE CLINIC | Facility: CLINIC | Age: 79
End: 2019-08-21

## 2019-08-21 NOTE — PROGRESS NOTES
Per Dr. Barnett, Mr. Pruett has been called with recent lab results & recommendations.  Continue current medications and follow-up as planned or sooner if any problems.  I have left a message for his daughter to call me as well per his request.

## 2019-08-21 NOTE — TELEPHONE ENCOUNTER
Notes recorded by Melly Vázquez LPN on 8/21/2019 at 3:41 PM CDT  Per Dr. Barnett, Mr. Pruett has been called with recent lab results & recommendations.  Continue current medications and follow-up as planned or sooner if any problems.  I have left a message for his daughter to call me as well per his request.  ------    Notes recorded by Jade Barnett MD on 8/21/2019 at 2:31 PM CDT  Call and tell sugar is running high, needs to make sure he is taking his medication daily.  Also digoxin level is a little high make sure he is only taking half a tablet daily.  Encourage hydration.

## 2019-09-03 ENCOUNTER — TRANSCRIBE ORDERS (OUTPATIENT)
Dept: LAB | Facility: HOSPITAL | Age: 79
End: 2019-09-03

## 2019-09-03 DIAGNOSIS — I10 ESSENTIAL (PRIMARY) HYPERTENSION: Primary | ICD-10-CM

## 2019-09-03 DIAGNOSIS — E55.9 VITAMIN D DEFICIENCY, UNSPECIFIED: ICD-10-CM

## 2019-09-26 NOTE — TELEPHONE ENCOUNTER
Detail Level: Detailed
PATIENT HAS RUN OUT OF HIS COREG 12.5 MG. HE SAID THE PHARMACY TOLD HIM TO SEE IF DR PAN HAS ANY SAMPLES HE COULD HAVE UNTIL HIS COMES IN THE MAIL. PLEASE CALL PATIENT [phone]  
Patient has been called, Unfortunately we do not have sample medications.     2 week emergency supply was sent to [facility] at the patient's request.   
Quality 111:Pneumonia Vaccination Status For Older Adults: Pneumococcal Vaccination Previously Received
Quality 110: Preventive Care And Screening: Influenza Immunization: Influenza Immunization previously received during influenza season

## 2019-11-08 ENCOUNTER — TRANSCRIBE ORDERS (OUTPATIENT)
Dept: CARDIOLOGY | Facility: CLINIC | Age: 79
End: 2019-11-08

## 2019-11-08 ENCOUNTER — TELEPHONE (OUTPATIENT)
Dept: FAMILY MEDICINE CLINIC | Facility: CLINIC | Age: 79
End: 2019-11-08

## 2019-11-08 DIAGNOSIS — R06.02 SHORTNESS OF BREATH: Primary | ICD-10-CM

## 2019-11-08 DIAGNOSIS — Z79.899 ENCOUNTER FOR LONG-TERM (CURRENT) USE OF OTHER MEDICATIONS: ICD-10-CM

## 2019-11-11 ENCOUNTER — APPOINTMENT (OUTPATIENT)
Dept: LAB | Facility: HOSPITAL | Age: 79
End: 2019-11-11

## 2019-11-11 ENCOUNTER — OFFICE VISIT (OUTPATIENT)
Dept: FAMILY MEDICINE CLINIC | Facility: CLINIC | Age: 79
End: 2019-11-11

## 2019-11-11 VITALS
HEART RATE: 76 BPM | HEIGHT: 67 IN | SYSTOLIC BLOOD PRESSURE: 112 MMHG | WEIGHT: 170.3 LBS | OXYGEN SATURATION: 93 % | BODY MASS INDEX: 26.73 KG/M2 | DIASTOLIC BLOOD PRESSURE: 60 MMHG

## 2019-11-11 DIAGNOSIS — I47.20 VENTRICULAR TACHYCARDIA (HCC): ICD-10-CM

## 2019-11-11 DIAGNOSIS — I48.20 CHRONIC ATRIAL FIBRILLATION (HCC): Chronic | ICD-10-CM

## 2019-11-11 DIAGNOSIS — M51.36 DEGENERATION OF INTERVERTEBRAL DISC BETWEEN FOURTH AND FIFTH LUMBAR VERTEBRAE: ICD-10-CM

## 2019-11-11 DIAGNOSIS — I10 ESSENTIAL HYPERTENSION: Chronic | ICD-10-CM

## 2019-11-11 DIAGNOSIS — E78.2 MIXED HYPERLIPIDEMIA: Chronic | ICD-10-CM

## 2019-11-11 DIAGNOSIS — I50.22 CHRONIC SYSTOLIC CONGESTIVE HEART FAILURE (HCC): ICD-10-CM

## 2019-11-11 DIAGNOSIS — M51.36 DEGENERATIVE DISC DISEASE, LUMBAR: Chronic | ICD-10-CM

## 2019-11-11 DIAGNOSIS — N18.30 CHRONIC RENAL FAILURE, STAGE 3 (MODERATE) (HCC): ICD-10-CM

## 2019-11-11 DIAGNOSIS — Z00.00 MEDICARE ANNUAL WELLNESS VISIT, SUBSEQUENT: Primary | ICD-10-CM

## 2019-11-11 DIAGNOSIS — E11.8 TYPE 2 DIABETES MELLITUS WITH COMPLICATION, WITHOUT LONG-TERM CURRENT USE OF INSULIN (HCC): Chronic | ICD-10-CM

## 2019-11-11 DIAGNOSIS — E03.9 ACQUIRED HYPOTHYROIDISM: Chronic | ICD-10-CM

## 2019-11-11 DIAGNOSIS — I42.9 CARDIOMYOPATHY, UNSPECIFIED TYPE (HCC): ICD-10-CM

## 2019-11-11 PROCEDURE — 36415 COLL VENOUS BLD VENIPUNCTURE: CPT | Performed by: INTERNAL MEDICINE

## 2019-11-11 PROCEDURE — 82306 VITAMIN D 25 HYDROXY: CPT | Performed by: INTERNAL MEDICINE

## 2019-11-11 PROCEDURE — 80048 BASIC METABOLIC PNL TOTAL CA: CPT | Performed by: GENERAL PRACTICE

## 2019-11-11 PROCEDURE — 84439 ASSAY OF FREE THYROXINE: CPT | Performed by: GENERAL PRACTICE

## 2019-11-11 PROCEDURE — 84443 ASSAY THYROID STIM HORMONE: CPT | Performed by: GENERAL PRACTICE

## 2019-11-11 PROCEDURE — G0439 PPPS, SUBSEQ VISIT: HCPCS | Performed by: GENERAL PRACTICE

## 2019-11-11 PROCEDURE — 99214 OFFICE O/P EST MOD 30 MIN: CPT | Performed by: GENERAL PRACTICE

## 2019-11-11 PROCEDURE — 83721 ASSAY OF BLOOD LIPOPROTEIN: CPT | Performed by: GENERAL PRACTICE

## 2019-11-11 PROCEDURE — 83036 HEMOGLOBIN GLYCOSYLATED A1C: CPT | Performed by: GENERAL PRACTICE

## 2019-11-11 RX ORDER — LANOLIN ALCOHOL/MO/W.PET/CERES
50 CREAM (GRAM) TOPICAL DAILY
COMMUNITY

## 2019-11-11 NOTE — PROGRESS NOTES
Subjective   Laci Pruett is a 79 y.o. male.   Chief Complaint   Patient presents with   • Diabetes     medicare wellness     For review and evaluation of management of chronic medical problems. Labs pending.  Sees Dr. Nevarez for his congestive heart failure, dilated cardiomyopathy and ventricular tachycardia.  He does have an ICD in place.  Diabetes   He presents for his follow-up diabetic visit. He has type 2 diabetes mellitus. His disease course has been stable. Pertinent negatives for hypoglycemia include no dizziness, headaches or nervousness/anxiousness. There are no diabetic associated symptoms. Pertinent negatives for diabetes include no chest pain, no fatigue and no weakness. There are no hypoglycemic complications. Symptoms are stable. Diabetic complications include nephropathy. Risk factors for coronary artery disease include diabetes mellitus, dyslipidemia and hypertension. Current diabetic treatment includes oral agent (monotherapy). He is compliant with treatment most of the time. His weight is stable. He is following a generally healthy diet. When asked about meal planning, he reported none. He has not had a previous visit with a dietitian. He never participates in exercise. Home blood sugar record trend: does not check regularly. An ACE inhibitor/angiotensin II receptor blocker is being taken. Eye exam is current (Dr. Anderson).   Hyperlipidemia   This is a chronic problem. The current episode started more than 1 year ago. The problem is controlled. Recent lipid tests were reviewed and are normal. Exacerbating diseases include diabetes and hypothyroidism. Pertinent negatives include no chest pain, myalgias or shortness of breath. Current antihyperlipidemic treatment includes statins. The current treatment provides significant improvement of lipids. There are no compliance problems.    Hypertension   This is a chronic problem. The current episode started more than 1 year ago. The problem has been  gradually improving since onset. The problem is controlled. Associated symptoms include anxiety. Pertinent negatives include no chest pain, headaches, neck pain, palpitations or shortness of breath. There are no associated agents to hypertension. Risk factors for coronary artery disease include diabetes mellitus, dyslipidemia, male gender and stress. Current antihypertension treatment includes calcium channel blockers and ACE inhibitors. The current treatment provides significant improvement. There are no compliance problems.    Hypothyroidism   This is a chronic problem. The current episode started more than 1 year ago. The problem occurs constantly. The problem has been unchanged. Pertinent negatives include no abdominal pain, arthralgias, chest pain, chills, congestion, coughing, fatigue, fever, headaches, joint swelling, myalgias, nausea, neck pain, numbness, rash, sore throat, vomiting or weakness. Nothing aggravates the symptoms. Treatments tried: levothyroxine. The treatment provided significant relief.   Back Pain   This is a chronic problem. The current episode started more than 1 year ago. The problem occurs constantly. The problem is unchanged. The pain is present in the lumbar spine. The pain is at a severity of 5/10. The pain is moderate. The pain is worse during the night. Pertinent negatives include no abdominal pain, chest pain, fever, headaches, numbness or weakness.      The following portions of the patient's history were reviewed and updated as appropriate: allergies, current medications, past social history and problem list.    Outpatient Medications Prior to Visit   Medication Sig Dispense Refill   • aspirin 81 MG tablet Take 1 tablet by mouth Daily. 100 tablet 3   • calcitriol (ROCALTROL) 0.25 MCG capsule Take 1 tablet on Monday, Wednesday & Friday. 36 capsule 2   • carvedilol (COREG) 6.25 MG tablet Take 1 tablet by mouth 2 (Two) Times a Day With Meals. (Patient taking differently: Take 12.5  mg by mouth 2 (Two) Times a Day With Meals.) 180 tablet 3   • cholecalciferol (VITAMIN D3) 1000 units tablet Take 1,000 Units by mouth Daily.     • Digoxin 62.5 MCG tablet Take 0.625 mcg by mouth Daily. 90 tablet 3   • docusate sodium (COLACE) 50 MG capsule Take 1 capsule by mouth.     • folic acid (FOLVITE) 800 MCG tablet Take 1 tablet by mouth Daily. 30 tablet 0   • furosemide (LASIX) 40 MG tablet Take 1 tablet by mouth Daily As Needed (for weight gain or swelling). (Patient taking differently: Take 40 mg by mouth Daily.) 90 tablet 3   • glimepiride (AMARYL) 4 MG tablet Take 1 tablet by mouth Every Morning Before Breakfast. 90 tablet 3   • levothyroxine (SYNTHROID) 50 MCG tablet Take 1 tablet by mouth Daily. 90 tablet 3   • Multiple Vitamin (VITAMIN E/FOLIC ACID/B-6/B-12) capsule Take  by mouth.     • nitroglycerin (NITROSTAT) 0.4 MG SL tablet Place 1 tablet under the tongue Every 5 (Five) Minutes As Needed for Chest Pain. Take no more than 3 doses in 15 minutes. 75 tablet 3   • omeprazole (priLOSEC) 20 MG capsule Take 1 capsule by mouth Daily. 90 capsule 3   • pravastatin (PRAVACHOL) 40 MG tablet Take 1 tablet by mouth Every Night. 90 tablet 3   • sacubitril-valsartan (ENTRESTO) 49-51 MG tablet Take 1 tablet by mouth 2 (Two) Times a Day. 180 tablet 3   • spironolactone (ALDACTONE) 25 MG tablet Take 0.5 tablets by mouth 3 (Three) Times a Week. 20 tablet 3   • vitamin B-12 (CYANOCOBALAMIN) 100 MCG tablet Take 50 mcg by mouth Daily.     • vitamin B-6 (PYRIDOXINE) 50 MG tablet Take 50 mg by mouth Daily.     • warfarin (COUMADIN) 3 MG tablet Take 1 tablet by mouth Daily. Or as directed 90 tablet 3   • ACCU-CHEK SOFTCLIX LANCETS lancets Test Blood Sugars Daily 100 each 4   • Alcohol Swabs (ALCOHOL PREP) pads Use for testing Blood Sugars at home. 200 each 4   • Blood Glucose Calibration (ACCU-CHEK DESMOND) solution Use for testing his home glucometer 3 each 4   • Blood Glucose Monitoring Suppl (ACCU-CHEK DESMOND PLUS)  "w/Device kit USE AS DIRECTED 1 kit 0   • glucose blood (ACCU-CHEK DESMOND) test strip Test Blood Sugars Once Daily 100 each 3   • tamsulosin (FLOMAX) 0.4 MG capsule 24 hr capsule Take 1 capsule by mouth Every Night. 90 capsule 3     No facility-administered medications prior to visit.        Review of Systems   Constitutional: Negative for chills, fatigue and fever.   HENT: Negative for congestion and sore throat.    Respiratory: Negative for cough and shortness of breath.    Cardiovascular: Negative for chest pain and palpitations.   Gastrointestinal: Negative for abdominal pain, nausea and vomiting.   Musculoskeletal: Positive for back pain. Negative for arthralgias, joint swelling, myalgias and neck pain.   Skin: Negative for rash.   Neurological: Negative for dizziness, weakness, numbness and headaches.   Psychiatric/Behavioral: The patient is not nervous/anxious.        Objective   Visit Vitals  /60   Pulse 76   Ht 170.2 cm (67\")   Wt 77.2 kg (170 lb 4.8 oz)   SpO2 93%   BMI 26.67 kg/m²     Physical Exam   Constitutional: He is oriented to person, place, and time. He appears well-developed and well-nourished. No distress.   HENT:   Head: Normocephalic.   Nose: Nose normal.   Mouth/Throat: Oropharynx is clear and moist.   Eyes: Conjunctivae and EOM are normal. Pupils are equal, round, and reactive to light. Right eye exhibits no discharge. Left eye exhibits no discharge.   Neck: No thyromegaly present.   Cardiovascular: Normal rate, regular rhythm, normal heart sounds and intact distal pulses.   No murmur heard.  Pulmonary/Chest: Effort normal and breath sounds normal.   Musculoskeletal: He exhibits no edema.        Lumbar back: He exhibits decreased range of motion and tenderness.        Back:    Lymphadenopathy:     He has no cervical adenopathy.   Neurological: He is alert and oriented to person, place, and time.   Skin: Skin is warm and dry.   Psychiatric: He has a normal mood and affect.   Nursing note " and vitals reviewed.      Assessment/Plan   Problem List Items Addressed This Visit        Cardiovascular and Mediastinum    Chronic atrial fibrillation (Chronic)    Relevant Orders    CBC & Differential    Comprehensive Metabolic Panel    Hemoglobin A1c    Lipid Panel    MicroAlbumin, Urine, Random - Urine, Clean Catch    Urinalysis With Culture If Indicated -    TSH    T4, Free    Essential hypertension (Chronic)    Relevant Orders    CBC & Differential    Comprehensive Metabolic Panel    Hemoglobin A1c    Lipid Panel    MicroAlbumin, Urine, Random - Urine, Clean Catch    Urinalysis With Culture If Indicated -    TSH    T4, Free    Mixed hyperlipidemia (Chronic)    Relevant Orders    CBC & Differential    Comprehensive Metabolic Panel    Hemoglobin A1c    Lipid Panel    MicroAlbumin, Urine, Random - Urine, Clean Catch    Urinalysis With Culture If Indicated -    TSH    T4, Free    Cardiomyopathy (CMS/[facility])    Ventricular tachycardia (CMS/[facility])    Chronic systolic congestive heart failure (CMS/[facility])       Endocrine    Acquired hypothyroidism (Chronic)    Relevant Orders    TSH    T4, Free    CBC & Differential    Comprehensive Metabolic Panel    Hemoglobin A1c    Lipid Panel    MicroAlbumin, Urine, Random - Urine, Clean Catch    Urinalysis With Culture If Indicated -    TSH    T4, Free    Type 2 diabetes mellitus with complication, without long-term current use of insulin (CMS/[facility]) (Chronic)    Relevant Orders    Hemoglobin A1c    LDL Cholesterol, Direct    CBC & Differential    Comprehensive Metabolic Panel    Hemoglobin A1c    Lipid Panel    MicroAlbumin, Urine, Random - Urine, Clean Catch    Urinalysis With Culture If Indicated -    TSH    T4, Free       Musculoskeletal and Integument    Degenerative disc disease, lumbar       Genitourinary    Chronic renal failure, stage 3 (moderate) (CMS/[facility])      Other Visit Diagnoses     Medicare annual wellness visit, subsequent    -  Primary         Will notify regarding  results. Continue current treatment. Follow up with cardiology and nephrology as scheduled.  Try to exercise Tylenol twice daily for his back pain.    No orders of the defined types were placed in this encounter.    Return in about 6 months (around 5/11/2020) for Recheck.

## 2019-11-11 NOTE — PROGRESS NOTES
The ABCs of the Annual Wellness Visit  Subsequent Medicare Wellness Visit    Chief Complaint   Patient presents with   • Diabetes     medicare wellness       Subjective   History of Present Illness:  Laci Pruett is a 79 y.o. male who presents for a Subsequent Medicare Wellness Visit.    HEALTH RISK ASSESSMENT    Recent Hospitalizations:  Recently treated at the following:  Other: [facility]    Current Medical Providers:  Patient Care Team:  Jade Barnett MD as PCP - General  [place], Laci ANDRADE MD as Consulting Physician (Urology)  Woody Campbell MD as Consulting Physician (Nephrology)  Bryn Nevarez MD as Consulting Physician (Interventional Cardiology)    Smoking Status:  Social History     Tobacco Use   Smoking Status Never Smoker   Smokeless Tobacco Never Used       Alcohol Consumption:  Social History     Substance and Sexual Activity   Alcohol Use No    Comment: 50 yrs ago       Depression Screen:   PHQ-2/PHQ-9 Depression Screening 11/11/2019   Little interest or pleasure in doing things 0   Feeling down, depressed, or hopeless 0   Trouble falling or staying asleep, or sleeping too much 1   Feeling tired or having little energy 3   Poor appetite or overeating 0   Feeling bad about yourself - or that you are a failure or have let yourself or your family down 0   Trouble concentrating on things, such as reading the newspaper or watching television 2   Moving or speaking so slowly that other people could have noticed. Or the opposite - being so fidgety or restless that you have been moving around a lot more than usual 1   Thoughts that you would be better off dead, or of hurting yourself in some way 0   Total Score 7   If you checked off any problems, how difficult have these problems made it for you to do your work, take care of things at home, or get along with other people? Not difficult at all       Fall Risk Screen:  STEADI Fall Risk Assessment was completed, and patient is at MODERATE risk  for falls. Assessment completed on:11/11/2019    Health Habits and Functional and Cognitive Screening:  Functional & Cognitive Status 11/11/2019   Do you have difficulty preparing food and eating? Yes   Do you have difficulty bathing yourself, getting dressed or grooming yourself? Yes   Do you have difficulty using the toilet? Yes   Do you have difficulty moving around from place to place? Yes   Do you have trouble with steps or getting out of a bed or a chair? Yes   Current Diet Well Balanced Diet   Dental Exam Not up to date   Eye Exam Up to date   Exercise (times per week) 0 times per week   Current Exercise Activities Include None   Do you need help using the phone?  No   Are you deaf or do you have serious difficulty hearing?  Yes   Do you need help with transportation? Yes   Do you need help shopping? Yes   Do you need help preparing meals?  Yes   Do you need help with housework?  Yes   Do you need help with laundry? No   Do you need help taking your medications? Yes   Do you need help managing money? Yes   Do you ever drive or ride in a car without wearing a seat belt? No   Have you felt unusual stress, anger or loneliness in the last month? Yes   Who do you live with? Alone   If you need help, do you have trouble finding someone available to you? Yes   Have you been bothered in the last four weeks by sexual problems? No   Do you have difficulty concentrating, remembering or making decisions? Yes         Does the patient have evidence of cognitive impairment? Yes    Asprin use counseling:Taking ASA appropriately as indicated    Age-appropriate Screening Schedule:  Refer to the list below for future screening recommendations based on patient's age, sex and/or medical conditions. Orders for these recommended tests are listed in the plan section. The patient has been provided with a written plan.    Health Maintenance   Topic Date Due   • ZOSTER VACCINE (2 of 2) 08/01/2017   • DIABETIC EYE EXAM  10/05/2018   •  INFLUENZA VACCINE  08/01/2019   • HEMOGLOBIN A1C  02/14/2020   • LIPID PANEL  04/25/2020   • URINE MICROALBUMIN  04/25/2020   • COLONOSCOPY  11/16/2021   • TDAP/TD VACCINES (2 - Td) 06/06/2027   • PNEUMOCOCCAL VACCINE (65+ HIGH RISK)  Completed          The following portions of the patient's history were reviewed and updated as appropriate: allergies, current medications, past family history, past medical history, past social history, past surgical history and problem list.    Outpatient Medications Prior to Visit   Medication Sig Dispense Refill   • aspirin 81 MG tablet Take 1 tablet by mouth Daily. 100 tablet 3   • calcitriol (ROCALTROL) 0.25 MCG capsule Take 1 tablet on Monday, Wednesday & Friday. 36 capsule 2   • carvedilol (COREG) 6.25 MG tablet Take 1 tablet by mouth 2 (Two) Times a Day With Meals. (Patient taking differently: Take 12.5 mg by mouth 2 (Two) Times a Day With Meals.) 180 tablet 3   • cholecalciferol (VITAMIN D3) 1000 units tablet Take 1,000 Units by mouth Daily.     • Digoxin 62.5 MCG tablet Take 0.625 mcg by mouth Daily. 90 tablet 3   • docusate sodium (COLACE) 50 MG capsule Take 1 capsule by mouth.     • folic acid (FOLVITE) 800 MCG tablet Take 1 tablet by mouth Daily. 30 tablet 0   • furosemide (LASIX) 40 MG tablet Take 1 tablet by mouth Daily As Needed (for weight gain or swelling). (Patient taking differently: Take 40 mg by mouth Daily.) 90 tablet 3   • glimepiride (AMARYL) 4 MG tablet Take 1 tablet by mouth Every Morning Before Breakfast. 90 tablet 3   • levothyroxine (SYNTHROID) 50 MCG tablet Take 1 tablet by mouth Daily. 90 tablet 3   • Multiple Vitamin (VITAMIN E/FOLIC ACID/B-6/B-12) capsule Take  by mouth.     • nitroglycerin (NITROSTAT) 0.4 MG SL tablet Place 1 tablet under the tongue Every 5 (Five) Minutes As Needed for Chest Pain. Take no more than 3 doses in 15 minutes. 75 tablet 3   • omeprazole (priLOSEC) 20 MG capsule Take 1 capsule by mouth Daily. 90 capsule 3   • pravastatin  (PRAVACHOL) 40 MG tablet Take 1 tablet by mouth Every Night. 90 tablet 3   • sacubitril-valsartan (ENTRESTO) 49-51 MG tablet Take 1 tablet by mouth 2 (Two) Times a Day. 180 tablet 3   • spironolactone (ALDACTONE) 25 MG tablet Take 0.5 tablets by mouth 3 (Three) Times a Week. 20 tablet 3   • vitamin B-12 (CYANOCOBALAMIN) 100 MCG tablet Take 50 mcg by mouth Daily.     • vitamin B-6 (PYRIDOXINE) 50 MG tablet Take 50 mg by mouth Daily.     • warfarin (COUMADIN) 3 MG tablet Take 1 tablet by mouth Daily. Or as directed 90 tablet 3   • ACCU-CHEK SOFTCLIX LANCETS lancets Test Blood Sugars Daily 100 each 4   • Alcohol Swabs (ALCOHOL PREP) pads Use for testing Blood Sugars at home. 200 each 4   • Blood Glucose Calibration (ACCU-CHEK DESMOND) solution Use for testing his home glucometer 3 each 4   • Blood Glucose Monitoring Suppl (ACCU-CHEK DESMOND PLUS) w/Device kit USE AS DIRECTED 1 kit 0   • glucose blood (ACCU-CHEK DESMOND) test strip Test Blood Sugars Once Daily 100 each 3   • tamsulosin (FLOMAX) 0.4 MG capsule 24 hr capsule Take 1 capsule by mouth Every Night. 90 capsule 3     No facility-administered medications prior to visit.        Patient Active Problem List   Diagnosis   • Acquired hypothyroidism   • Chronic atrial fibrillation   • Degenerative disc disease, lumbar   • Essential hypertension   • Mixed hyperlipidemia   • Type 2 diabetes mellitus with complication, without long-term current use of insulin (CMS/HCC)   • Positive colorectal cancer screening using Cologuard test   • Chronic renal failure, stage 3 (moderate) (CMS/HCC)   • Cardiomyopathy (CMS/HCC)   • Ventricular tachycardia (CMS/HCC)   • Chronic systolic congestive heart failure (CMS/HCC)       Advanced Care Planning:  Patient does not have an advance directive - information provided to the patient today    Review of Systems   Constitutional: Negative.  Negative for chills, fatigue, fever and unexpected weight change.   HENT: Negative.  Negative for  "congestion, ear pain, hearing loss, nosebleeds, rhinorrhea, sneezing, sore throat and tinnitus.    Eyes: Negative.  Negative for discharge.   Respiratory: Negative.  Negative for cough, shortness of breath and wheezing.    Cardiovascular: Negative.  Negative for chest pain and palpitations.   Gastrointestinal: Negative.  Negative for abdominal pain, constipation, diarrhea, nausea and vomiting.   Endocrine: Negative.    Genitourinary: Negative.  Negative for dysuria, frequency and urgency.   Musculoskeletal: Negative.  Negative for arthralgias, back pain, joint swelling, myalgias and neck pain.   Skin: Negative.  Negative for rash.   Allergic/Immunologic: Negative.    Neurological: Negative.  Negative for dizziness, weakness, numbness and headaches.   Hematological: Negative.  Negative for adenopathy.   Psychiatric/Behavioral: Negative.  Negative for dysphoric mood and sleep disturbance. The patient is not nervous/anxious.        Compared to one year ago, the patient feels his physical health is better.  Compared to one year ago, the patient feels his mental health is the same.    Reviewed chart for potential of high risk medication in the elderly: yes  Reviewed chart for potential of harmful drug interactions in the elderly:not applicable    Objective         Vitals:    11/11/19 1444   BP: 112/60   Pulse: 76   SpO2: 93%   Weight: 77.2 kg (170 lb 4.8 oz)   Height: 170.2 cm (67\")   PainSc: 0-No pain       Body mass index is 26.67 kg/m².  Discussed the patient's BMI with him. The BMI is above average; BMI management plan is completed.    Physical Exam   Constitutional: He is oriented to person, place, and time. He appears well-developed and well-nourished. No distress.   HENT:   Head: Normocephalic.   Nose: Nose normal.   Mouth/Throat: Oropharynx is clear and moist.   Eyes: Conjunctivae and EOM are normal. Pupils are equal, round, and reactive to light. Right eye exhibits no discharge. Left eye exhibits no discharge. "   Neck: No thyromegaly present.   Cardiovascular: Normal rate, regular rhythm, normal heart sounds and intact distal pulses.   No murmur heard.  Pulmonary/Chest: Effort normal and breath sounds normal.   Musculoskeletal: He exhibits no edema.   Lymphadenopathy:     He has no cervical adenopathy.   Neurological: He is alert and oriented to person, place, and time.   Skin: Skin is warm and dry.   Psychiatric: He has a normal mood and affect.   Nursing note and vitals reviewed.      Lab Results   Component Value Date    HGBA1C 8.20 (H) 08/14/2019        Assessment/Plan   Medicare Risks and Personalized Health Plan  CMS Preventative Services Quick Reference  Advance Directive Discussion  Dementia/Memory   Fall Risk  Immunizations Discussed/Encouraged (specific immunizations; Shingrix )    The above risks/problems have been discussed with the patient.  Pertinent information has been shared with the patient in the After Visit Summary.  Follow up plans and orders are seen below in the Assessment/Plan Section.    Diagnoses and all orders for this visit:    1. Medicare annual wellness visit, subsequent (Primary)    2. Type 2 diabetes mellitus with complication, without long-term current use of insulin (CMS/[facility])  -     Cancel: Basic Metabolic Panel  -     Hemoglobin A1c  -     LDL Cholesterol, Direct    3. Acquired hypothyroidism  -     TSH  -     T4, Free    4. Degeneration of intervertebral disc between fourth and fifth lumbar vertebrae    5. Chronic atrial fibrillation    6. Essential hypertension    7. Mixed hyperlipidemia    8. Chronic renal failure, stage 3 (moderate) (CMS/[facility])    9. Cardiomyopathy, unspecified type (CMS/[facility])    10. Ventricular tachycardia (CMS/[facility])    11. Chronic systolic congestive heart failure (CMS/[facility])    12. Degenerative disc disease, lumbar      Follow Up:  Return in about 6 months (around 5/11/2020) for Recheck.     An After Visit Summary and PPPS were given to the patient.    Information has  been scanned into chart.  Discussed importance of taking medications as prescribed. Encouraged healthy eating habits with low fat, low salt choices and working towards maintaining a healthy weight. Recommended regular exercise if able as well as care to prevent falls including avoiding anything on the floor that they could slip or trip on such as throw rugs, making sure they have a bathmat to step onto when their feet are wet and having grab bars and railings where needed.

## 2019-11-11 NOTE — PATIENT INSTRUCTIONS
Try Tylenol  mg twice a day for back pain    Check at pharmacy on Shingrix shingles vaccine    Medicare Wellness  Personal Prevention Plan of Service     Date of Office Visit:  2019  Encounter Provider:  Jade Barnett MD  Place of Service:  [facility]  Patient Name: Laci Pruett  :  1940    As part of the Medicare Wellness portion of your visit today, we are providing you with this personalized preventive plan of services (PPPS). This plan is based upon recommendations of the [place] Preventive Services Task Force (USPSTF) and the Advisory Committee on Immunization Practices (ACIP).    This lists the preventive care services that should be considered, and provides dates of when you are due. Items listed as completed are up-to-date and do not require any further intervention.    Health Maintenance   Topic Date Due   • ZOSTER VACCINE (2 of 2) 2017   • MEDICARE ANNUAL WELLNESS  10/05/2018   • DIABETIC EYE EXAM  10/05/2018   • INFLUENZA VACCINE  2019   • HEMOGLOBIN A1C  2020   • LIPID PANEL  2020   • URINE MICROALBUMIN  2020   • COLONOSCOPY  2021   • TDAP/TD VACCINES (2 - Td) 2027   • PNEUMOCOCCAL VACCINES (65+ LOW/MEDIUM RISK)  Completed       No orders of the defined types were placed in this encounter.      No Follow-up on file.

## 2019-11-12 LAB
25(OH)D3 SERPL-MCNC: 45.7 NG/ML (ref 30–100)
ANION GAP SERPL CALCULATED.3IONS-SCNC: 11.7 MMOL/L (ref 5–15)
ARTICHOKE IGE QN: 104 MG/DL (ref 0–100)
BUN BLD-MCNC: 32 MG/DL (ref 8–23)
BUN/CREAT SERPL: 19.9 (ref 7–25)
CALCIUM SPEC-SCNC: 9.4 MG/DL (ref 8.6–10.5)
CHLORIDE SERPL-SCNC: 97 MMOL/L (ref 98–107)
CO2 SERPL-SCNC: 33.3 MMOL/L (ref 22–29)
CREAT BLD-MCNC: 1.61 MG/DL (ref 0.76–1.27)
GFR SERPL CREATININE-BSD FRML MDRD: 42 ML/MIN/1.73
GLUCOSE BLD-MCNC: 205 MG/DL (ref 65–99)
HBA1C MFR BLD: 8.78 % (ref 4.8–5.6)
POTASSIUM BLD-SCNC: 4.7 MMOL/L (ref 3.5–5.2)
SODIUM BLD-SCNC: 142 MMOL/L (ref 136–145)
T4 FREE SERPL-MCNC: 1.22 NG/DL (ref 0.93–1.7)
TSH SERPL DL<=0.05 MIU/L-ACNC: 5.4 UIU/ML (ref 0.27–4.2)

## 2019-11-22 ENCOUNTER — TELEPHONE (OUTPATIENT)
Dept: FAMILY MEDICINE CLINIC | Facility: CLINIC | Age: 79
End: 2019-11-22

## 2019-11-22 RX ORDER — GLIPIZIDE 5 MG/1
5 TABLET ORAL
Qty: 60 TABLET | Refills: 5 | Status: SHIPPED | OUTPATIENT
Start: 2019-11-22 | End: 2020-01-23 | Stop reason: SDUPTHER

## 2019-11-22 NOTE — TELEPHONE ENCOUNTER
Per Dr. Barnett, MR. Pruett family has been called with recent lab results & recommendations.  Continue current medications and follow-up as planned or sooner if any problems.    New Script sent to [place] pharmacy      ----- Message from Jade Barnett MD sent at 11/20/2019  4:56 PM CST -----  Call and tell sugar is running too high.  I would like him to switch from glimepiride 4 mg daily to glipizide 5 mg twice daily, send a prescription for 6 months

## 2019-12-11 ENCOUNTER — TELEPHONE (OUTPATIENT)
Dept: FAMILY MEDICINE CLINIC | Facility: CLINIC | Age: 79
End: 2019-12-11

## 2019-12-13 DIAGNOSIS — I48.20 CHRONIC ATRIAL FIBRILLATION (HCC): ICD-10-CM

## 2019-12-13 RX ORDER — WARFARIN SODIUM 3 MG/1
3 TABLET ORAL
Qty: 94 TABLET | Refills: 3 | Status: SHIPPED | OUTPATIENT
Start: 2019-12-13 | End: 2020-01-23 | Stop reason: SDUPTHER

## 2019-12-19 ENCOUNTER — TELEPHONE (OUTPATIENT)
Dept: FAMILY MEDICINE CLINIC | Facility: CLINIC | Age: 79
End: 2019-12-19

## 2020-01-23 ENCOUNTER — TELEPHONE (OUTPATIENT)
Dept: FAMILY MEDICINE CLINIC | Facility: CLINIC | Age: 80
End: 2020-01-23

## 2020-01-23 DIAGNOSIS — E03.9 ACQUIRED HYPOTHYROIDISM: Chronic | ICD-10-CM

## 2020-01-23 DIAGNOSIS — I48.20 CHRONIC ATRIAL FIBRILLATION (HCC): ICD-10-CM

## 2020-01-23 DIAGNOSIS — I42.0 DILATED CARDIOMYOPATHY (HCC): Chronic | ICD-10-CM

## 2020-01-23 DIAGNOSIS — I10 ESSENTIAL HYPERTENSION: Chronic | ICD-10-CM

## 2020-01-23 RX ORDER — NITROGLYCERIN 0.4 MG/1
0.4 TABLET SUBLINGUAL
Qty: 75 TABLET | Refills: 3 | Status: SHIPPED | OUTPATIENT
Start: 2020-01-23

## 2020-01-23 RX ORDER — CARVEDILOL 6.25 MG/1
6.25 TABLET ORAL 2 TIMES DAILY WITH MEALS
Qty: 180 TABLET | Refills: 3 | Status: SHIPPED | OUTPATIENT
Start: 2020-01-23 | End: 2021-01-19

## 2020-01-23 RX ORDER — SPIRONOLACTONE 25 MG/1
12.5 TABLET ORAL 3 TIMES WEEKLY
Qty: 20 TABLET | Refills: 3 | Status: SHIPPED | OUTPATIENT
Start: 2020-01-24 | End: 2021-03-11

## 2020-01-23 RX ORDER — DIGOXIN 0.06 MG/1
0.62 TABLET ORAL
Qty: 90 TABLET | Refills: 3 | Status: SHIPPED | OUTPATIENT
Start: 2020-01-23 | End: 2020-01-28 | Stop reason: SDUPTHER

## 2020-01-23 RX ORDER — PRAVASTATIN SODIUM 40 MG
40 TABLET ORAL NIGHTLY
Qty: 90 TABLET | Refills: 3 | Status: SHIPPED | OUTPATIENT
Start: 2020-01-23 | End: 2021-01-01

## 2020-01-23 RX ORDER — GLIPIZIDE 5 MG/1
5 TABLET ORAL
Qty: 60 TABLET | Refills: 5 | Status: SHIPPED | OUTPATIENT
Start: 2020-01-23 | End: 2020-09-02

## 2020-01-23 RX ORDER — CALCITRIOL 0.25 UG/1
CAPSULE, LIQUID FILLED ORAL
Qty: 36 CAPSULE | Refills: 2 | Status: SHIPPED | OUTPATIENT
Start: 2020-01-23 | End: 2020-09-02

## 2020-01-23 RX ORDER — WARFARIN SODIUM 3 MG/1
3 TABLET ORAL
Qty: 94 TABLET | Refills: 3 | Status: SHIPPED | OUTPATIENT
Start: 2020-01-23 | End: 2021-01-04

## 2020-01-23 RX ORDER — LEVOTHYROXINE SODIUM 0.05 MG/1
50 TABLET ORAL DAILY
Qty: 90 TABLET | Refills: 3 | Status: SHIPPED | OUTPATIENT
Start: 2020-01-23 | End: 2021-02-22

## 2020-01-23 RX ORDER — OMEPRAZOLE 20 MG/1
20 CAPSULE, DELAYED RELEASE ORAL DAILY
Qty: 90 CAPSULE | Refills: 3 | Status: SHIPPED | OUTPATIENT
Start: 2020-01-23 | End: 2021-02-02

## 2020-01-23 NOTE — TELEPHONE ENCOUNTER
Pt is now getting his meds from [facility]. Their phone is [phone] and fax is [phone]. His ID # is [id number]. His local pharmacy will still be [facility].   He needs these meds 90 day supply sent to [facility] pharmacy:  Pravastatin 40 mg  Tamsulosin .5 mg  Warfarin 3 mg  Carvedilol 6.25 mg  Omeprazole 20 mg  Glipizide 5 mg  Calcitriol 0.25 mcg  Nitroglycerin 0.4 mg  Digoxin   Daughter said bottle says .125 mcg  Levothyroxine 50 mcg  Spironolactone 25 mg

## 2020-01-27 ENCOUNTER — TELEPHONE (OUTPATIENT)
Dept: FAMILY MEDICINE CLINIC | Facility: CLINIC | Age: 80
End: 2020-01-27

## 2020-01-27 NOTE — TELEPHONE ENCOUNTER
Message on phone from Rachel requesting that someone call back regarding a med that Dr. Barnett prescribed for Mr. Pruett,   Their number is [phone]  ID number [id number]

## 2020-01-27 NOTE — TELEPHONE ENCOUNTER
Called above number and they transferred me 3 time and finally said call could not be completed at this time to please try later

## 2020-01-28 DIAGNOSIS — I48.20 CHRONIC ATRIAL FIBRILLATION (HCC): ICD-10-CM

## 2020-01-28 RX ORDER — DIGOXIN 0.06 MG/1
62.5 TABLET ORAL
Qty: 90 TABLET | Refills: 3 | Status: SHIPPED | OUTPATIENT
Start: 2020-01-28 | End: 2020-02-06 | Stop reason: SDUPTHER

## 2020-01-30 ENCOUNTER — TRANSCRIBE ORDERS (OUTPATIENT)
Dept: GENERAL RADIOLOGY | Facility: CLINIC | Age: 80
End: 2020-01-30

## 2020-01-30 DIAGNOSIS — I47.20 VT (VENTRICULAR TACHYCARDIA) (HCC): Primary | ICD-10-CM

## 2020-01-30 DIAGNOSIS — Z79.899 OTHER LONG TERM (CURRENT) DRUG THERAPY: ICD-10-CM

## 2020-01-31 DIAGNOSIS — I10 ESSENTIAL HYPERTENSION: Chronic | ICD-10-CM

## 2020-01-31 DIAGNOSIS — I48.20 CHRONIC ATRIAL FIBRILLATION (HCC): Chronic | ICD-10-CM

## 2020-01-31 DIAGNOSIS — E78.2 MIXED HYPERLIPIDEMIA: Chronic | ICD-10-CM

## 2020-01-31 DIAGNOSIS — Z79.899 ENCOUNTER FOR LONG-TERM (CURRENT) USE OF OTHER MEDICATIONS: ICD-10-CM

## 2020-01-31 DIAGNOSIS — E11.8 TYPE 2 DIABETES MELLITUS WITH COMPLICATION, WITHOUT LONG-TERM CURRENT USE OF INSULIN (HCC): Chronic | ICD-10-CM

## 2020-01-31 DIAGNOSIS — E03.9 ACQUIRED HYPOTHYROIDISM: Chronic | ICD-10-CM

## 2020-01-31 DIAGNOSIS — R06.02 SHORTNESS OF BREATH: ICD-10-CM

## 2020-02-06 ENCOUNTER — TELEPHONE (OUTPATIENT)
Dept: FAMILY MEDICINE CLINIC | Facility: CLINIC | Age: 80
End: 2020-02-06

## 2020-02-06 DIAGNOSIS — I48.20 CHRONIC ATRIAL FIBRILLATION (HCC): ICD-10-CM

## 2020-02-06 RX ORDER — DIGOXIN 0.06 MG/1
62.5 TABLET ORAL
Qty: 90 TABLET | Refills: 3 | Status: SHIPPED | OUTPATIENT
Start: 2020-02-06 | End: 2021-04-15

## 2020-02-06 NOTE — TELEPHONE ENCOUNTER
Pt's daughter called and said that Digoxin that was sent to [facility] is too expensive and needs you to resend the generic which is what he normally gets. He also needs a 2 wk supply of this sent to local pharmacy [facility] in [place]. Phone is [phone].

## 2020-02-10 ENCOUNTER — TELEPHONE (OUTPATIENT)
Dept: FAMILY MEDICINE CLINIC | Facility: CLINIC | Age: 80
End: 2020-02-10

## 2020-02-10 NOTE — TELEPHONE ENCOUNTER
I received a call from pharmacy that Laci wants Digoxin mg lowered back to what he was taking and they wanted approval to lower.

## 2020-02-11 ENCOUNTER — TELEPHONE (OUTPATIENT)
Dept: FAMILY MEDICINE CLINIC | Facility: CLINIC | Age: 80
End: 2020-02-11

## 2020-02-11 NOTE — TELEPHONE ENCOUNTER
Mr Flynn called back and he said he did not ask [facility] to reduce mg on Digoxin, he did not know why they called us, he said he does not need a refill, he just picked one up from [facility].   He said he is using local pharmacies at this time, which ever is cheaper for him and he will let us know where to send when he needs refills.

## 2020-02-18 ENCOUNTER — TELEPHONE (OUTPATIENT)
Dept: FAMILY MEDICINE CLINIC | Facility: CLINIC | Age: 80
End: 2020-02-18

## 2020-02-26 RX ORDER — TAMSULOSIN HYDROCHLORIDE 0.4 MG/1
1 CAPSULE ORAL
Qty: 90 CAPSULE | Refills: 1 | Status: SHIPPED | OUTPATIENT
Start: 2020-02-26 | End: 2020-09-02

## 2020-03-10 ENCOUNTER — TELEPHONE (OUTPATIENT)
Dept: FAMILY MEDICINE CLINIC | Facility: CLINIC | Age: 80
End: 2020-03-10

## 2020-03-10 NOTE — TELEPHONE ENCOUNTER
DR ESTEVEZ'S OFFICE HAS CALLED AND STATES THIS IS A MUTUAL PATIENT. PATIENT IS TRYING TO TELL THEM THAT HE JUST HAD LABS DONE 2 WEEKS AGO BUT CAN NOT FIND ANYTHING ON THIS PATIENT.     SHE IS ASKING IF YOU CAN FAX HER THE LAST LABS THAT THIS PATIENT HAD DONE AND THEY WILL JUST USE THOSE. PLEASE FAX [phone]

## 2020-03-16 ENCOUNTER — TRANSCRIBE ORDERS (OUTPATIENT)
Dept: LAB | Facility: HOSPITAL | Age: 80
End: 2020-03-16

## 2020-03-16 DIAGNOSIS — N18.9 CHRONIC KIDNEY DISEASE, UNSPECIFIED CKD STAGE: Primary | ICD-10-CM

## 2020-04-27 ENCOUNTER — OFFICE VISIT (OUTPATIENT)
Dept: FAMILY MEDICINE CLINIC | Facility: CLINIC | Age: 80
End: 2020-04-27

## 2020-04-27 ENCOUNTER — TELEPHONE (OUTPATIENT)
Dept: FAMILY MEDICINE CLINIC | Facility: CLINIC | Age: 80
End: 2020-04-27

## 2020-04-27 DIAGNOSIS — R29.898 LEG WEAKNESS, BILATERAL: Primary | ICD-10-CM

## 2020-04-27 DIAGNOSIS — S49.91XD INJURY OF RIGHT SHOULDER, SUBSEQUENT ENCOUNTER: ICD-10-CM

## 2020-04-27 DIAGNOSIS — M79.601 RIGHT ARM PAIN: ICD-10-CM
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
## 2020-04-27 PROCEDURE — 99442 PR PHYS/QHP TELEPHONE EVALUATION 11-20 MIN: CPT | Performed by: GENERAL PRACTICE

## 2020-04-27 NOTE — PROGRESS NOTES
Subjective   Laci Pruett is a 80 y.o. male.   No chief complaint on file.    You have chosen to receive care through a telephone visit. Do you consent to use a telephone visit for your medical care today? Yes     Fall   The accident occurred 5 to 7 days ago. The fall occurred while walking (Was pushing a drill press down a ramp and legs got weak causing him to be thrown off the ramp ). He fell from a height of 3 to 5 ft. He landed on dirt. The point of impact was the right shoulder and right elbow. The pain is present in the right upper arm, right lower arm and right shoulder. The pain is at a severity of 6/10. The pain is moderate. The symptoms are aggravated by movement and use of injured limb. Pertinent negatives include no abdominal pain, fever, headaches, loss of consciousness, nausea, numbness or vomiting. He has tried acetaminophen and heat for the symptoms. The treatment provided mild relief.   Patient states that his legs got got weak and then the drill press got away from him and threw him off the ramp.  He is now having difficulty ambulating and caring for himself.  Was seen at [facility] urgent care and x-rays were done which did not show any fractures.    The following portions of the patient's history were reviewed and updated as appropriate: allergies, current medications, past social history and problem list.    Outpatient Medications Prior to Visit   Medication Sig Dispense Refill   • aspirin 81 MG tablet Take 1 tablet by mouth Daily. 100 tablet 3   • calcitriol (ROCALTROL) 0.25 MCG capsule Take 1 tablet on Monday, Wednesday & Friday. 36 capsule 2   • carvedilol (COREG) 6.25 MG tablet Take 1 tablet by mouth 2 (Two) Times a Day With Meals. 180 tablet 3   • cholecalciferol (VITAMIN D3) 1000 units tablet Take 1,000 Units by mouth Daily.     • Digoxin 62.5 MCG tablet Take 62.5 mcg by mouth Daily. 90 tablet 3   • docusate sodium (COLACE) 50 MG capsule Take 1 capsule by mouth.     • folic acid  (FOLVITE) 800 MCG tablet Take 1 tablet by mouth Daily. 30 tablet 0   • furosemide (LASIX) 40 MG tablet Take 1 tablet by mouth Daily As Needed (for weight gain or swelling). (Patient taking differently: Take 40 mg by mouth Daily.) 90 tablet 3   • glipizide (GLUCOTROL) 5 MG tablet Take 1 tablet by mouth 2 (Two) Times a Day Before Meals. 60 tablet 5   • levothyroxine (SYNTHROID) 50 MCG tablet Take 1 tablet by mouth Daily. ID # is [id number] 90 tablet 3   • Multiple Vitamin (VITAMIN E/FOLIC ACID/B-6/B-12) capsule Take  by mouth.     • nitroglycerin (NITROSTAT) 0.4 MG SL tablet Place 1 tablet under the tongue Every 5 (Five) Minutes As Needed for Chest Pain. Take no more than 3 doses in 15 minutes. 75 tablet 3   • omeprazole (priLOSEC) 20 MG capsule Take 1 capsule by mouth Daily. 90 capsule 3   • pravastatin (PRAVACHOL) 40 MG tablet Take 1 tablet by mouth Every Night. 90 tablet 3   • sacubitril-valsartan (ENTRESTO) 49-51 MG tablet Take 1 tablet by mouth 2 (Two) Times a Day. 180 tablet 3   • spironolactone (ALDACTONE) 25 MG tablet Take 0.5 tablets by mouth 3 (Three) Times a Week. 20 tablet 3   • tamsulosin (FLOMAX) 0.4 MG capsule 24 hr capsule Take 1 capsule by mouth every night at bedtime. 90 capsule 1   • vitamin B-12 (CYANOCOBALAMIN) 100 MCG tablet Take 50 mcg by mouth Daily.     • vitamin B-6 (PYRIDOXINE) 50 MG tablet Take 50 mg by mouth Daily.     • warfarin (COUMADIN) 3 MG tablet Take 1 tablet by mouth Daily. Except 2 tabs on wednesday 94 tablet 3     No facility-administered medications prior to visit.        Review of Systems   Constitutional: Negative.  Negative for chills, fatigue, fever and unexpected weight change.   HENT: Negative.  Negative for congestion, ear pain, hearing loss, nosebleeds, rhinorrhea, sneezing, sore throat and tinnitus.    Eyes: Negative.  Negative for discharge.   Respiratory: Negative.  Negative for cough, shortness of breath and wheezing.    Cardiovascular: Negative.  Negative for  chest pain and palpitations.   Gastrointestinal: Negative.  Negative for abdominal pain, constipation, diarrhea, nausea and vomiting.   Endocrine: Negative.    Genitourinary: Negative.  Negative for dysuria, frequency and urgency.   Musculoskeletal: Negative.  Negative for arthralgias, back pain, joint swelling, myalgias and neck pain.   Skin: Negative.  Negative for rash.   Allergic/Immunologic: Negative.    Neurological: Negative.  Negative for dizziness, loss of consciousness, weakness, numbness and headaches.   Hematological: Negative.  Negative for adenopathy.   Psychiatric/Behavioral: Negative.  Negative for dysphoric mood and sleep disturbance. The patient is not nervous/anxious.        Objective   There were no vitals taken for this visit.  Physical Exam    Telephone visit     Assessment/Plan   Problem List Items Addressed This Visit     None      Visit Diagnoses     Leg weakness, bilateral    -  Primary    Fall, initial encounter        Injury of right shoulder, subsequent encounter        Right arm pain             Home health referral.  Continue acetaminophen for pain.    This visit has been rescheduled as a phone visit to comply with patient safety concerns in accordance with CDC recommendations. The time that was spent in reviewing the patient's chart and addressing the patient's symptoms, diagnosis and treatment was 18  mins.       No orders of the defined types were placed in this encounter.    Return if symptoms worsen or fail to improve, for Next scheduled follow up.

## 2020-04-29 ENCOUNTER — TELEPHONE (OUTPATIENT)
Dept: FAMILY MEDICINE CLINIC | Facility: CLINIC | Age: 80
End: 2020-04-29

## 2020-04-29 NOTE — TELEPHONE ENCOUNTER
Spoke with his daughter, he is going to see an orthopedic surgeon about his shoulder.  Unfortunately home health does not provide personal care which is what he is wanting.  He was provided with the number for home instead as an option.

## 2020-04-29 NOTE — TELEPHONE ENCOUNTER
PT'S DAUGHTER, MEIR, CALLED TO LET DR. PAN KNOW THAT PT IS STILL VERY SORE FROM HIS FALL AND PT IS NOW BRUISING.     MEIR STATED SHE BELIEVES THAT THEY MIGHT HAVE MISSED SOMETHING IN THE X-RAY BECAUSE PT CAN HARDLY MOVE AROUND.    PLEASE ADVISE MEIR.    MEIR'S CALLBACK NUMBER: [phone]

## 2020-04-30 ENCOUNTER — LAB REQUISITION (OUTPATIENT)
Dept: LAB | Facility: OTHER | Age: 80
End: 2020-04-30

## 2020-04-30 DIAGNOSIS — E11.9 TYPE 2 DIABETES MELLITUS WITHOUT COMPLICATIONS (HCC): ICD-10-CM

## 2020-04-30 PROCEDURE — 83036 HEMOGLOBIN GLYCOSYLATED A1C: CPT | Performed by: GENERAL PRACTICE

## 2020-05-01 LAB — HBA1C MFR BLD: 12.1 % (ref 4.8–5.6)

## 2020-05-11 ENCOUNTER — TELEMEDICINE (OUTPATIENT)
Dept: FAMILY MEDICINE CLINIC | Facility: CLINIC | Age: 80
End: 2020-05-11

## 2020-05-11 VITALS — HEIGHT: 67 IN | BODY MASS INDEX: 26.68 KG/M2 | WEIGHT: 170 LBS

## 2020-05-11 DIAGNOSIS — E11.8 TYPE 2 DIABETES MELLITUS WITH COMPLICATION, WITHOUT LONG-TERM CURRENT USE OF INSULIN (HCC): Primary | Chronic | ICD-10-CM

## 2020-05-11 DIAGNOSIS — N18.30 CHRONIC RENAL FAILURE, STAGE 3 (MODERATE) (HCC): ICD-10-CM

## 2020-05-11 PROCEDURE — 99213 OFFICE O/P EST LOW 20 MIN: CPT | Performed by: GENERAL PRACTICE

## 2020-05-11 NOTE — PROGRESS NOTES
You have chosen to receive care through a telehealth visit.  Do you consent to use a video/audio connection for your medical care today? Yes    Subjective   Laci Pruett is a 80 y.o. male.   Chief Complaint   Patient presents with   • Diabetes     For review and evaluation of management of chronic medical problems. Labs reviewed. A1C is over 12.   Diabetes   He presents for his follow-up diabetic visit. He has type 2 diabetes mellitus. His disease course has been stable. Pertinent negatives for hypoglycemia include no dizziness, headaches or nervousness/anxiousness. There are no diabetic associated symptoms. Pertinent negatives for diabetes include no chest pain, no fatigue and no weakness. There are no hypoglycemic complications. Symptoms are stable. Diabetic complications include nephropathy. Risk factors for coronary artery disease include diabetes mellitus, dyslipidemia and hypertension. Current diabetic treatment includes oral agent (monotherapy). He is compliant with treatment most of the time. His weight is stable. He is following a generally unhealthy diet. When asked about meal planning, he reported none. He has not had a previous visit with a dietitian. He never participates in exercise. Home blood sugar record trend: does not check regularly. An ACE inhibitor/angiotensin II receptor blocker is being taken. Eye exam is current (Dr. Anderson).   Hypertension   This is a chronic problem. The current episode started more than 1 year ago. The problem has been gradually improving since onset. The problem is controlled. Associated symptoms include anxiety. Pertinent negatives include no chest pain, headaches, neck pain, palpitations or shortness of breath. There are no associated agents to hypertension. Risk factors for coronary artery disease include diabetes mellitus, dyslipidemia, male gender and stress. Current antihypertension treatment includes calcium channel blockers and ACE inhibitors. The current  treatment provides significant improvement. There are no compliance problems.       The following portions of the patient's history were reviewed and updated as appropriate: allergies, current medications, past social history and problem list.    Outpatient Medications Prior to Visit   Medication Sig Dispense Refill   • aspirin 81 MG tablet Take 1 tablet by mouth Daily. 100 tablet 3   • calcitriol (ROCALTROL) 0.25 MCG capsule Take 1 tablet on Monday, Wednesday & Friday. 36 capsule 2   • carvedilol (COREG) 6.25 MG tablet Take 1 tablet by mouth 2 (Two) Times a Day With Meals. 180 tablet 3   • cholecalciferol (VITAMIN D3) 1000 units tablet Take 1,000 Units by mouth Daily.     • Digoxin 62.5 MCG tablet Take 62.5 mcg by mouth Daily. 90 tablet 3   • docusate sodium (COLACE) 50 MG capsule Take 1 capsule by mouth.     • folic acid (FOLVITE) 800 MCG tablet Take 1 tablet by mouth Daily. 30 tablet 0   • furosemide (LASIX) 40 MG tablet Take 1 tablet by mouth Daily As Needed (for weight gain or swelling). (Patient taking differently: Take 40 mg by mouth Daily.) 90 tablet 3   • glipizide (GLUCOTROL) 5 MG tablet Take 1 tablet by mouth 2 (Two) Times a Day Before Meals. 60 tablet 5   • levothyroxine (SYNTHROID) 50 MCG tablet Take 1 tablet by mouth Daily. ID # is [id number] 90 tablet 3   • Multiple Vitamin (VITAMIN E/FOLIC ACID/B-6/B-12) capsule Take  by mouth.     • nitroglycerin (NITROSTAT) 0.4 MG SL tablet Place 1 tablet under the tongue Every 5 (Five) Minutes As Needed for Chest Pain. Take no more than 3 doses in 15 minutes. 75 tablet 3   • omeprazole (priLOSEC) 20 MG capsule Take 1 capsule by mouth Daily. 90 capsule 3   • pravastatin (PRAVACHOL) 40 MG tablet Take 1 tablet by mouth Every Night. 90 tablet 3   • sacubitril-valsartan (ENTRESTO) 49-51 MG tablet Take 1 tablet by mouth 2 (Two) Times a Day. 180 tablet 3   • spironolactone (ALDACTONE) 25 MG tablet Take 0.5 tablets by mouth 3 (Three) Times a Week. 20 tablet 3   •  "tamsulosin (FLOMAX) 0.4 MG capsule 24 hr capsule Take 1 capsule by mouth every night at bedtime. 90 capsule 1   • vitamin B-12 (CYANOCOBALAMIN) 100 MCG tablet Take 50 mcg by mouth Daily.     • vitamin B-6 (PYRIDOXINE) 50 MG tablet Take 50 mg by mouth Daily.     • warfarin (COUMADIN) 3 MG tablet Take 1 tablet by mouth Daily. Except 2 tabs on wednesday 94 tablet 3     No facility-administered medications prior to visit.        Review of Systems   Constitutional: Negative.  Negative for chills, fatigue, fever and unexpected weight change.   HENT: Negative.  Negative for congestion, ear pain, hearing loss, nosebleeds, rhinorrhea, sneezing, sore throat and tinnitus.    Eyes: Negative.  Negative for discharge.   Respiratory: Negative.  Negative for cough, shortness of breath and wheezing.    Cardiovascular: Negative.  Negative for chest pain and palpitations.   Gastrointestinal: Negative.  Negative for abdominal pain, constipation, diarrhea, nausea and vomiting.   Endocrine: Negative.    Genitourinary: Negative.  Negative for dysuria, frequency and urgency.   Musculoskeletal: Negative.  Negative for arthralgias, back pain, joint swelling, myalgias and neck pain.   Skin: Negative.  Negative for rash.   Allergic/Immunologic: Negative.    Neurological: Negative.  Negative for dizziness, weakness, numbness and headaches.   Hematological: Negative.  Negative for adenopathy.   Psychiatric/Behavioral: Negative.  Negative for dysphoric mood and sleep disturbance. The patient is not nervous/anxious.      Objective   Visit Vitals  Ht 170.2 cm (67\")   Wt 77.1 kg (170 lb)   BMI 26.63 kg/m²     Physical Exam   Constitutional: He is oriented to person, place, and time. He appears well-developed and well-nourished.   HENT:   Head: Normocephalic and atraumatic.   Eyes: Pupils are equal, round, and reactive to light. Conjunctivae and EOM are normal.   Pulmonary/Chest: Effort normal.   Neurological: He is alert and oriented to person, " place, and time.   Psychiatric: He has a normal mood and affect.   Vitals reviewed.    Notes brought forward are reviewed and updated if indicated.    Results for orders placed or performed in visit on 04/30/20   Hemoglobin A1c   Result Value Ref Range    Hemoglobin A1C 12.10 (H) 4.80 - 5.60 %      Assessment/Plan   Problem List Items Addressed This Visit        Endocrine    Type 2 diabetes mellitus with complication, without long-term current use of insulin (CMS/[facility]) - Primary (Chronic)    Relevant Medications    SITagliptin (Januvia) 50 MG tablet    Other Relevant Orders    Ambulatory Referral to Endocrinology       Genitourinary    Chronic renal failure, stage 3 (moderate) (CMS/[facility])           Patient Instructions   Add Januvia. Referral to endocrinology.       This was an audio and video enabled telemedicine encounter. The time that was spent in reviewing the patient's chart and addressing their symptoms, diagnosis and treatment was 17 mins.      New Medications Ordered This Visit   Medications   • SITagliptin (Januvia) 50 MG tablet     Sig: Take 1 tablet by mouth Daily.     Dispense:  30 tablet     Refill:  2     Return in about 3 months (around 8/11/2020) for Recheck.

## 2020-05-13 ENCOUNTER — TELEPHONE (OUTPATIENT)
Dept: FAMILY MEDICINE CLINIC | Facility: CLINIC | Age: 80
End: 2020-05-13

## 2020-05-13 ENCOUNTER — OUTSIDE FACILITY SERVICE (OUTPATIENT)
Dept: FAMILY MEDICINE CLINIC | Facility: CLINIC | Age: 80
End: 2020-05-13

## 2020-05-13 PROCEDURE — G0180 MD CERTIFICATION HHA PATIENT: HCPCS | Performed by: GENERAL PRACTICE

## 2020-05-13 NOTE — TELEPHONE ENCOUNTER
Dr. Barnett,    I talked to Mr. Pruett and relayed the information about the other medications that were requested.  He said the Januvia was to expensive.   Then I called [facility] to see if we needed to get a PA.  She tells me at [facility] the medication was going to be sathya $500.00,   She then said they got it to go through for $42.00.  I called Mr. Pruett to let him know that the script was going to be $42.00, he said that is still to expensive for him.     I told him to continue on his current medication and follow-up with Dr. Magallanes and you as planned.     Please advise if anything further needs to be relayed to him.     Thank you    GERSON Pickard

## 2020-05-13 NOTE — TELEPHONE ENCOUNTER
Patient called in stating that the sorayauvia is way too expensice for him. He stated that he called the insurance company and they recommended   Metformin 500 mg tablets or pioglitazone.    Pharmacy confirmed.

## 2020-05-13 NOTE — TELEPHONE ENCOUNTER
Call and tell him that he cannot take either of those medications because of his kidney and heart disease.  We will just have to wait until he sees the endocrinologist.

## 2020-05-14 NOTE — TELEPHONE ENCOUNTER
Destiny,    I have called and talked to one of Mr. Eaves daughters Ms. Sharlene Palafox and relayed the information that I had discussed in this note with her father.     She ask what number was listed on the referral to Dr. Magallanes, She said he probably won't answer the phone and they need to call her or her sister with the referal appointment information.  Can you please either fax these numbers to Dr. Magallanes's office or call them with the additional numbers to kita with the appointment?    Sharlene Palafox's number is [phone]  Kellie Foote's number is [phone]      Thank you  GERSON Pickard

## 2020-05-18 NOTE — TELEPHONE ENCOUNTER
Patient's family has been called and the daughter states they have been called and he has an appt. For 05/29/2020 with Dr. Hammer

## 2020-06-01 ENCOUNTER — TRANSCRIBE ORDERS (OUTPATIENT)
Dept: LAB | Facility: HOSPITAL | Age: 80
End: 2020-06-01

## 2020-06-01 DIAGNOSIS — N18.9 CHRONIC KIDNEY DISEASE, UNSPECIFIED CKD STAGE: Primary | ICD-10-CM

## 2020-07-01 ENCOUNTER — TRANSCRIBE ORDERS (OUTPATIENT)
Dept: LAB | Facility: HOSPITAL | Age: 80
End: 2020-07-01

## 2020-07-01 DIAGNOSIS — I50.9 CONGESTIVE HEART FAILURE, UNSPECIFIED HF CHRONICITY, UNSPECIFIED HEART FAILURE TYPE (HCC): ICD-10-CM

## 2020-07-01 DIAGNOSIS — N18.9 CHRONIC KIDNEY DISEASE, UNSPECIFIED CKD STAGE: Primary | ICD-10-CM

## 2020-09-02 RX ORDER — CALCITRIOL 0.25 UG/1
CAPSULE, LIQUID FILLED ORAL
Qty: 36 CAPSULE | Refills: 2 | Status: SHIPPED | OUTPATIENT
Start: 2020-09-02 | End: 2021-01-01

## 2020-09-02 RX ORDER — TAMSULOSIN HYDROCHLORIDE 0.4 MG/1
CAPSULE ORAL
Qty: 90 CAPSULE | Refills: 1 | Status: SHIPPED | OUTPATIENT
Start: 2020-09-02 | End: 2021-03-23

## 2020-09-02 RX ORDER — GLIPIZIDE 5 MG/1
TABLET ORAL
Qty: 60 TABLET | Refills: 5 | Status: SHIPPED | OUTPATIENT
Start: 2020-09-02 | End: 2022-01-01

## 2020-11-19 ENCOUNTER — LAB (OUTPATIENT)
Dept: LAB | Facility: HOSPITAL | Age: 80
End: 2020-11-19

## 2020-11-19 ENCOUNTER — TRANSCRIBE ORDERS (OUTPATIENT)
Dept: LAB | Facility: HOSPITAL | Age: 80
End: 2020-11-19

## 2020-11-19 DIAGNOSIS — N18.9 CHRONIC KIDNEY DISEASE, UNSPECIFIED CKD STAGE: ICD-10-CM

## 2020-11-19 DIAGNOSIS — N18.30 STAGE 3 CHRONIC KIDNEY DISEASE, UNSPECIFIED WHETHER STAGE 3A OR 3B CKD (HCC): Primary | ICD-10-CM

## 2020-11-19 DIAGNOSIS — I50.9 CONGESTIVE HEART FAILURE, UNSPECIFIED HF CHRONICITY, UNSPECIFIED HEART FAILURE TYPE (HCC): ICD-10-CM

## 2020-11-19 LAB
ALBUMIN SERPL-MCNC: 4.4 G/DL (ref 3.5–5.2)
ANION GAP SERPL CALCULATED.3IONS-SCNC: 9.5 MMOL/L (ref 5–15)
BUN SERPL-MCNC: 29 MG/DL (ref 8–23)
BUN/CREAT SERPL: 23.4 (ref 7–25)
CALCIUM SPEC-SCNC: 8.9 MG/DL (ref 8.6–10.5)
CHLORIDE SERPL-SCNC: 104 MMOL/L (ref 98–107)
CO2 SERPL-SCNC: 28.5 MMOL/L (ref 22–29)
CREAT SERPL-MCNC: 1.24 MG/DL (ref 0.76–1.27)
DIGOXIN SERPL-MCNC: 0.9 NG/ML (ref 0.6–1.2)
GFR SERPL CREATININE-BSD FRML MDRD: 56 ML/MIN/1.73
GLUCOSE SERPL-MCNC: 255 MG/DL (ref 65–99)
PHOSPHATE SERPL-MCNC: 2 MG/DL (ref 2.5–4.5)
POTASSIUM SERPL-SCNC: 5.1 MMOL/L (ref 3.5–5.2)
PTH-INTACT SERPL-MCNC: 121 PG/ML (ref 15–65)
SODIUM SERPL-SCNC: 142 MMOL/L (ref 136–145)

## 2020-11-19 PROCEDURE — 80069 RENAL FUNCTION PANEL: CPT | Performed by: INTERNAL MEDICINE

## 2020-11-19 PROCEDURE — 80162 ASSAY OF DIGOXIN TOTAL: CPT

## 2020-11-19 PROCEDURE — 84156 ASSAY OF PROTEIN URINE: CPT | Performed by: INTERNAL MEDICINE

## 2020-11-19 PROCEDURE — 83970 ASSAY OF PARATHORMONE: CPT | Performed by: INTERNAL MEDICINE

## 2020-11-19 PROCEDURE — 36415 COLL VENOUS BLD VENIPUNCTURE: CPT

## 2020-11-19 PROCEDURE — 82570 ASSAY OF URINE CREATININE: CPT | Performed by: INTERNAL MEDICINE

## 2020-11-20 LAB
CREAT UR-MCNC: 53.5 MG/DL
PROT UR-MCNC: 4.9 MG/DL
PROT/CREAT UR: 91.6 MG/G CREA (ref 0–200)

## 2020-12-11 DIAGNOSIS — I42.0 DILATED CARDIOMYOPATHY (HCC): Chronic | ICD-10-CM

## 2020-12-11 RX ORDER — SACUBITRIL AND VALSARTAN 49; 51 MG/1; MG/1
1 TABLET, FILM COATED ORAL 2 TIMES DAILY
Qty: 180 TABLET | Refills: 3 | Status: SHIPPED | OUTPATIENT
Start: 2020-12-11

## 2020-12-15 ENCOUNTER — OFFICE VISIT (OUTPATIENT)
Dept: FAMILY MEDICINE CLINIC | Facility: CLINIC | Age: 80
End: 2020-12-15

## 2020-12-15 ENCOUNTER — LAB (OUTPATIENT)
Dept: LAB | Facility: HOSPITAL | Age: 80
End: 2020-12-15

## 2020-12-15 VITALS
DIASTOLIC BLOOD PRESSURE: 58 MMHG | WEIGHT: 165 LBS | HEIGHT: 67 IN | BODY MASS INDEX: 25.9 KG/M2 | SYSTOLIC BLOOD PRESSURE: 100 MMHG | OXYGEN SATURATION: 95 % | HEART RATE: 71 BPM

## 2020-12-15 DIAGNOSIS — E03.9 ACQUIRED HYPOTHYROIDISM: ICD-10-CM

## 2020-12-15 DIAGNOSIS — Z00.00 MEDICARE ANNUAL WELLNESS VISIT, SUBSEQUENT: Primary | ICD-10-CM

## 2020-12-15 DIAGNOSIS — Z23 NEED FOR INFLUENZA VACCINATION: ICD-10-CM

## 2020-12-15 DIAGNOSIS — I10 ESSENTIAL HYPERTENSION: ICD-10-CM

## 2020-12-15 DIAGNOSIS — Z11.59 NEED FOR HEPATITIS C SCREENING TEST: ICD-10-CM

## 2020-12-15 DIAGNOSIS — E11.8 TYPE 2 DIABETES MELLITUS WITH COMPLICATION, WITHOUT LONG-TERM CURRENT USE OF INSULIN (HCC): ICD-10-CM

## 2020-12-15 DIAGNOSIS — I48.20 CHRONIC ATRIAL FIBRILLATION (HCC): ICD-10-CM

## 2020-12-15 PROCEDURE — G0439 PPPS, SUBSEQ VISIT: HCPCS | Performed by: GENERAL PRACTICE

## 2020-12-15 PROCEDURE — G0008 ADMIN INFLUENZA VIRUS VAC: HCPCS | Performed by: GENERAL PRACTICE

## 2020-12-15 PROCEDURE — 86803 HEPATITIS C AB TEST: CPT

## 2020-12-15 PROCEDURE — 99214 OFFICE O/P EST MOD 30 MIN: CPT | Performed by: GENERAL PRACTICE

## 2020-12-15 PROCEDURE — 83721 ASSAY OF BLOOD LIPOPROTEIN: CPT | Performed by: GENERAL PRACTICE

## 2020-12-15 PROCEDURE — 84443 ASSAY THYROID STIM HORMONE: CPT | Performed by: GENERAL PRACTICE

## 2020-12-15 PROCEDURE — 36415 COLL VENOUS BLD VENIPUNCTURE: CPT | Performed by: GENERAL PRACTICE

## 2020-12-15 PROCEDURE — 83036 HEMOGLOBIN GLYCOSYLATED A1C: CPT | Performed by: GENERAL PRACTICE

## 2020-12-15 PROCEDURE — 84439 ASSAY OF FREE THYROXINE: CPT | Performed by: GENERAL PRACTICE

## 2020-12-15 PROCEDURE — 1159F MED LIST DOCD IN RCRD: CPT | Performed by: GENERAL PRACTICE

## 2020-12-15 PROCEDURE — 85025 COMPLETE CBC W/AUTO DIFF WBC: CPT | Performed by: GENERAL PRACTICE

## 2020-12-15 PROCEDURE — 1170F FXNL STATUS ASSESSED: CPT | Performed by: GENERAL PRACTICE

## 2020-12-15 PROCEDURE — 90694 VACC AIIV4 NO PRSRV 0.5ML IM: CPT | Performed by: GENERAL PRACTICE

## 2020-12-15 PROCEDURE — 80053 COMPREHEN METABOLIC PANEL: CPT | Performed by: GENERAL PRACTICE

## 2020-12-16 LAB
ALBUMIN SERPL-MCNC: 4.3 G/DL (ref 3.5–5.2)
ALBUMIN/GLOB SERPL: 1.1 G/DL
ALP SERPL-CCNC: 70 U/L (ref 39–117)
ALT SERPL W P-5'-P-CCNC: 14 U/L (ref 1–41)
ANION GAP SERPL CALCULATED.3IONS-SCNC: 7.8 MMOL/L (ref 5–15)
ARTICHOKE IGE QN: 112 MG/DL (ref 0–100)
AST SERPL-CCNC: 23 U/L (ref 1–40)
BASOPHILS # BLD AUTO: 0.04 10*3/MM3 (ref 0–0.2)
BASOPHILS NFR BLD AUTO: 0.6 % (ref 0–1.5)
BILIRUB SERPL-MCNC: 0.3 MG/DL (ref 0–1.2)
BUN SERPL-MCNC: 24 MG/DL (ref 8–23)
BUN/CREAT SERPL: 17 (ref 7–25)
CALCIUM SPEC-SCNC: 9 MG/DL (ref 8.6–10.5)
CHLORIDE SERPL-SCNC: 99 MMOL/L (ref 98–107)
CO2 SERPL-SCNC: 34.2 MMOL/L (ref 22–29)
CREAT SERPL-MCNC: 1.41 MG/DL (ref 0.76–1.27)
DEPRECATED RDW RBC AUTO: 45.2 FL (ref 37–54)
EOSINOPHIL # BLD AUTO: 0.24 10*3/MM3 (ref 0–0.4)
EOSINOPHIL NFR BLD AUTO: 3.3 % (ref 0.3–6.2)
ERYTHROCYTE [DISTWIDTH] IN BLOOD BY AUTOMATED COUNT: 14 % (ref 12.3–15.4)
GFR SERPL CREATININE-BSD FRML MDRD: 49 ML/MIN/1.73
GLOBULIN UR ELPH-MCNC: 4 GM/DL
GLUCOSE SERPL-MCNC: 188 MG/DL (ref 65–99)
HBA1C MFR BLD: 7.6 % (ref 4.8–5.6)
HCT VFR BLD AUTO: 41.4 % (ref 37.5–51)
HCV AB SER DONR QL: NORMAL
HGB BLD-MCNC: 13.5 G/DL (ref 13–17.7)
LYMPHOCYTES # BLD AUTO: 1.86 10*3/MM3 (ref 0.7–3.1)
LYMPHOCYTES NFR BLD AUTO: 25.9 % (ref 19.6–45.3)
MCH RBC QN AUTO: 29.1 PG (ref 26.6–33)
MCHC RBC AUTO-ENTMCNC: 32.6 G/DL (ref 31.5–35.7)
MCV RBC AUTO: 89.2 FL (ref 79–97)
MONOCYTES # BLD AUTO: 0.6 10*3/MM3 (ref 0.1–0.9)
MONOCYTES NFR BLD AUTO: 8.4 % (ref 5–12)
NEUTROPHILS NFR BLD AUTO: 4.41 10*3/MM3 (ref 1.7–7)
NEUTROPHILS NFR BLD AUTO: 61.5 % (ref 42.7–76)
PLATELET # BLD AUTO: 134 10*3/MM3 (ref 140–450)
PMV BLD AUTO: 12 FL (ref 6–12)
POTASSIUM SERPL-SCNC: 5.1 MMOL/L (ref 3.5–5.2)
PROT SERPL-MCNC: 8.3 G/DL (ref 6–8.5)
RBC # BLD AUTO: 4.64 10*6/MM3 (ref 4.14–5.8)
SODIUM SERPL-SCNC: 141 MMOL/L (ref 136–145)
T4 FREE SERPL-MCNC: 1.09 NG/DL (ref 0.93–1.7)
TSH SERPL DL<=0.05 MIU/L-ACNC: 2.01 UIU/ML (ref 0.27–4.2)
WBC # BLD AUTO: 7.17 10*3/MM3 (ref 3.4–10.8)

## 2021-01-01 ENCOUNTER — OFFICE VISIT (OUTPATIENT)
Dept: FAMILY MEDICINE CLINIC | Facility: CLINIC | Age: 81
End: 2021-01-01

## 2021-01-01 ENCOUNTER — TRANSCRIBE ORDERS (OUTPATIENT)
Dept: LAB | Facility: HOSPITAL | Age: 81
End: 2021-01-01

## 2021-01-01 ENCOUNTER — LAB (OUTPATIENT)
Dept: LAB | Facility: HOSPITAL | Age: 81
End: 2021-01-01

## 2021-01-01 ENCOUNTER — TRANSCRIBE ORDERS (OUTPATIENT)
Dept: LAB | Facility: OTHER | Age: 81
End: 2021-01-01

## 2021-01-01 ENCOUNTER — PREP FOR SURGERY (OUTPATIENT)
Dept: OTHER | Facility: HOSPITAL | Age: 81
End: 2021-01-01

## 2021-01-01 ENCOUNTER — TELEPHONE (OUTPATIENT)
Dept: FAMILY MEDICINE CLINIC | Facility: CLINIC | Age: 81
End: 2021-01-01

## 2021-01-01 ENCOUNTER — IMMUNIZATION (OUTPATIENT)
Dept: VACCINE CLINIC | Facility: HOSPITAL | Age: 81
End: 2021-01-01

## 2021-01-01 VITALS
BODY MASS INDEX: 25.27 KG/M2 | WEIGHT: 161 LBS | SYSTOLIC BLOOD PRESSURE: 101 MMHG | HEART RATE: 71 BPM | HEIGHT: 67 IN | OXYGEN SATURATION: 96 % | DIASTOLIC BLOOD PRESSURE: 60 MMHG

## 2021-01-01 VITALS
HEART RATE: 70 BPM | BODY MASS INDEX: 25.03 KG/M2 | OXYGEN SATURATION: 96 % | HEIGHT: 67 IN | WEIGHT: 159.5 LBS | SYSTOLIC BLOOD PRESSURE: 106 MMHG | DIASTOLIC BLOOD PRESSURE: 60 MMHG

## 2021-01-01 DIAGNOSIS — R74.8 ELEVATED LIVER ENZYMES: Primary | ICD-10-CM

## 2021-01-01 DIAGNOSIS — R74.8 ELEVATED LIVER ENZYMES: ICD-10-CM

## 2021-01-01 DIAGNOSIS — E87.5 HYPERKALEMIA: Primary | ICD-10-CM

## 2021-01-01 DIAGNOSIS — I10 ESSENTIAL HYPERTENSION: ICD-10-CM

## 2021-01-01 DIAGNOSIS — E11.8 TYPE 2 DIABETES MELLITUS WITH COMPLICATION, WITHOUT LONG-TERM CURRENT USE OF INSULIN (HCC): Primary | ICD-10-CM

## 2021-01-01 DIAGNOSIS — E78.2 MIXED HYPERLIPIDEMIA: ICD-10-CM

## 2021-01-01 DIAGNOSIS — E78.2 MIXED HYPERLIPIDEMIA: Chronic | ICD-10-CM

## 2021-01-01 DIAGNOSIS — Z00.00 MEDICARE ANNUAL WELLNESS VISIT, SUBSEQUENT: Primary | ICD-10-CM

## 2021-01-01 DIAGNOSIS — I10 ESSENTIAL HYPERTENSION: Chronic | ICD-10-CM

## 2021-01-01 DIAGNOSIS — E11.8 TYPE 2 DIABETES MELLITUS WITH COMPLICATION, WITHOUT LONG-TERM CURRENT USE OF INSULIN (HCC): ICD-10-CM

## 2021-01-01 DIAGNOSIS — I48.20 CHRONIC ATRIAL FIBRILLATION (HCC): ICD-10-CM

## 2021-01-01 DIAGNOSIS — E03.9 ACQUIRED HYPOTHYROIDISM: ICD-10-CM

## 2021-01-01 DIAGNOSIS — E87.5 HYPERKALEMIA: ICD-10-CM

## 2021-01-01 DIAGNOSIS — N17.9 ACUTE RENAL FAILURE, UNSPECIFIED ACUTE RENAL FAILURE TYPE (HCC): Primary | ICD-10-CM

## 2021-01-01 DIAGNOSIS — Z12.11 SCREEN FOR COLON CANCER: Primary | ICD-10-CM

## 2021-01-01 DIAGNOSIS — N17.9 ACUTE RENAL FAILURE, UNSPECIFIED ACUTE RENAL FAILURE TYPE (HCC): ICD-10-CM

## 2021-01-01 DIAGNOSIS — I48.20 CHRONIC ATRIAL FIBRILLATION (HCC): Chronic | ICD-10-CM

## 2021-01-01 DIAGNOSIS — N18.32 STAGE 3B CHRONIC KIDNEY DISEASE (HCC): ICD-10-CM

## 2021-01-01 DIAGNOSIS — E11.8 TYPE 2 DIABETES MELLITUS WITH COMPLICATION, WITHOUT LONG-TERM CURRENT USE OF INSULIN (HCC): Chronic | ICD-10-CM

## 2021-01-01 DIAGNOSIS — N18.32 STAGE 3B CHRONIC KIDNEY DISEASE (HCC): Primary | ICD-10-CM

## 2021-01-01 DIAGNOSIS — Z23 NEED FOR IMMUNIZATION AGAINST INFLUENZA: ICD-10-CM

## 2021-01-01 DIAGNOSIS — I10 ESSENTIAL HYPERTENSION: Primary | ICD-10-CM

## 2021-01-01 LAB
ALBUMIN SERPL-MCNC: 4 G/DL (ref 3.5–5.2)
ALBUMIN SERPL-MCNC: 4.1 G/DL (ref 3.5–5.2)
ALBUMIN SERPL-MCNC: 4.3 G/DL (ref 3.5–5.2)
ALBUMIN/GLOB SERPL: 0.9 G/DL
ALBUMIN/GLOB SERPL: 1.2 G/DL
ALBUMIN/GLOB SERPL: 1.3 G/DL
ALP SERPL-CCNC: 48 U/L (ref 39–117)
ALP SERPL-CCNC: 55 U/L (ref 39–117)
ALP SERPL-CCNC: 60 U/L (ref 39–117)
ALT SERPL W P-5'-P-CCNC: 14 U/L (ref 1–41)
ALT SERPL W P-5'-P-CCNC: 14 U/L (ref 1–41)
ALT SERPL W P-5'-P-CCNC: 17 U/L (ref 1–41)
ANION GAP SERPL CALCULATED.3IONS-SCNC: 10.5 MMOL/L (ref 5–15)
ANION GAP SERPL CALCULATED.3IONS-SCNC: 12 MMOL/L (ref 5–15)
ANION GAP SERPL CALCULATED.3IONS-SCNC: 8.6 MMOL/L (ref 5–15)
ANION GAP SERPL CALCULATED.3IONS-SCNC: 9.4 MMOL/L (ref 5–15)
ARTICHOKE IGE QN: 110 MG/DL (ref 0–100)
AST SERPL-CCNC: 18 U/L (ref 1–40)
AST SERPL-CCNC: 21 U/L (ref 1–40)
AST SERPL-CCNC: 23 U/L (ref 1–40)
BACTERIA UR QL AUTO: NORMAL /HPF
BASOPHILS # BLD AUTO: 0.02 10*3/MM3 (ref 0–0.2)
BASOPHILS # BLD AUTO: 0.03 10*3/MM3 (ref 0–0.2)
BASOPHILS NFR BLD AUTO: 0.3 % (ref 0–1.5)
BASOPHILS NFR BLD AUTO: 0.5 % (ref 0–1.5)
BILIRUB SERPL-MCNC: 0.3 MG/DL (ref 0–1.2)
BILIRUB SERPL-MCNC: 0.4 MG/DL (ref 0–1.2)
BILIRUB SERPL-MCNC: 0.6 MG/DL (ref 0–1.2)
BILIRUB UR QL STRIP: NEGATIVE
BUN SERPL-MCNC: 19 MG/DL (ref 8–23)
BUN SERPL-MCNC: 21 MG/DL (ref 8–23)
BUN SERPL-MCNC: 31 MG/DL (ref 8–23)
BUN SERPL-MCNC: 41 MG/DL (ref 8–23)
BUN/CREAT SERPL: 16.8 (ref 7–25)
BUN/CREAT SERPL: 19.4 (ref 7–25)
BUN/CREAT SERPL: 22.3 (ref 7–25)
BUN/CREAT SERPL: 24.2 (ref 7–25)
CALCIUM SPEC-SCNC: 8.8 MG/DL (ref 8.6–10.5)
CALCIUM SPEC-SCNC: 8.8 MG/DL (ref 8.6–10.5)
CALCIUM SPEC-SCNC: 9.3 MG/DL (ref 8.6–10.5)
CALCIUM SPEC-SCNC: 9.5 MG/DL (ref 8.6–10.5)
CHLORIDE SERPL-SCNC: 100 MMOL/L (ref 98–107)
CHLORIDE SERPL-SCNC: 100 MMOL/L (ref 98–107)
CHLORIDE SERPL-SCNC: 103 MMOL/L (ref 98–107)
CHLORIDE SERPL-SCNC: 104 MMOL/L (ref 98–107)
CHOLEST SERPL-MCNC: 135 MG/DL (ref 0–200)
CLARITY UR: CLEAR
CO2 SERPL-SCNC: 29.4 MMOL/L (ref 22–29)
CO2 SERPL-SCNC: 29.6 MMOL/L (ref 22–29)
CO2 SERPL-SCNC: 30 MMOL/L (ref 22–29)
CO2 SERPL-SCNC: 30.5 MMOL/L (ref 22–29)
COLOR UR: YELLOW
CREAT SERPL-MCNC: 1.08 MG/DL (ref 0.76–1.27)
CREAT SERPL-MCNC: 1.13 MG/DL (ref 0.76–1.27)
CREAT SERPL-MCNC: 1.28 MG/DL (ref 0.76–1.27)
CREAT SERPL-MCNC: 1.84 MG/DL (ref 0.76–1.27)
CREAT UR-MCNC: 108.2 MG/DL
CREAT UR-MCNC: 113.5 MG/DL
DEPRECATED RDW RBC AUTO: 45.9 FL (ref 37–54)
DEPRECATED RDW RBC AUTO: 48 FL (ref 37–54)
DIGOXIN SERPL-MCNC: 1 NG/ML (ref 0.6–1.2)
EOSINOPHIL # BLD AUTO: 0.2 10*3/MM3 (ref 0–0.4)
EOSINOPHIL # BLD AUTO: 0.21 10*3/MM3 (ref 0–0.4)
EOSINOPHIL NFR BLD AUTO: 3.4 % (ref 0.3–6.2)
EOSINOPHIL NFR BLD AUTO: 3.4 % (ref 0.3–6.2)
ERYTHROCYTE [DISTWIDTH] IN BLOOD BY AUTOMATED COUNT: 14.6 % (ref 12.3–15.4)
ERYTHROCYTE [DISTWIDTH] IN BLOOD BY AUTOMATED COUNT: 14.8 % (ref 12.3–15.4)
GFR SERPL CREATININE-BSD FRML MDRD: 36 ML/MIN/1.73
GFR SERPL CREATININE-BSD FRML MDRD: 54 ML/MIN/1.73
GFR SERPL CREATININE-BSD FRML MDRD: 63 ML/MIN/1.73
GFR SERPL CREATININE-BSD FRML MDRD: 66 ML/MIN/1.73
GLOBULIN UR ELPH-MCNC: 3.2 GM/DL
GLOBULIN UR ELPH-MCNC: 3.5 GM/DL
GLOBULIN UR ELPH-MCNC: 4.4 GM/DL
GLUCOSE SERPL-MCNC: 171 MG/DL (ref 65–99)
GLUCOSE SERPL-MCNC: 176 MG/DL (ref 65–99)
GLUCOSE SERPL-MCNC: 66 MG/DL (ref 65–99)
GLUCOSE SERPL-MCNC: 89 MG/DL (ref 65–99)
GLUCOSE UR STRIP-MCNC: ABNORMAL MG/DL
HBA1C MFR BLD: 6.47 % (ref 4.8–5.6)
HBA1C MFR BLD: 6.6 % (ref 4.8–5.6)
HCT VFR BLD AUTO: 34.8 % (ref 37.5–51)
HCT VFR BLD AUTO: 39.4 % (ref 37.5–51)
HDLC SERPL-MCNC: 48 MG/DL (ref 40–60)
HGB BLD-MCNC: 10.8 G/DL (ref 13–17.7)
HGB BLD-MCNC: 12.8 G/DL (ref 13–17.7)
HGB UR QL STRIP.AUTO: NEGATIVE
HYALINE CASTS UR QL AUTO: NORMAL /LPF
IMM GRANULOCYTES # BLD AUTO: 0.01 10*3/MM3 (ref 0–0.05)
IMM GRANULOCYTES # BLD AUTO: 0.02 10*3/MM3 (ref 0–0.05)
IMM GRANULOCYTES NFR BLD AUTO: 0.2 % (ref 0–0.5)
IMM GRANULOCYTES NFR BLD AUTO: 0.3 % (ref 0–0.5)
KETONES UR QL STRIP: ABNORMAL
LDLC SERPL CALC-MCNC: 73 MG/DL (ref 0–100)
LDLC/HDLC SERPL: 1.51 {RATIO}
LEUKOCYTE ESTERASE UR QL STRIP.AUTO: NEGATIVE
LYMPHOCYTES # BLD AUTO: 1.52 10*3/MM3 (ref 0.7–3.1)
LYMPHOCYTES # BLD AUTO: 1.74 10*3/MM3 (ref 0.7–3.1)
LYMPHOCYTES NFR BLD AUTO: 24.9 % (ref 19.6–45.3)
LYMPHOCYTES NFR BLD AUTO: 29.7 % (ref 19.6–45.3)
MCH RBC QN AUTO: 26.9 PG (ref 26.6–33)
MCH RBC QN AUTO: 29 PG (ref 26.6–33)
MCHC RBC AUTO-ENTMCNC: 31 G/DL (ref 31.5–35.7)
MCHC RBC AUTO-ENTMCNC: 32.5 G/DL (ref 31.5–35.7)
MCV RBC AUTO: 86.8 FL (ref 79–97)
MCV RBC AUTO: 89.1 FL (ref 79–97)
MONOCYTES # BLD AUTO: 0.44 10*3/MM3 (ref 0.1–0.9)
MONOCYTES # BLD AUTO: 0.46 10*3/MM3 (ref 0.1–0.9)
MONOCYTES NFR BLD AUTO: 7.5 % (ref 5–12)
MONOCYTES NFR BLD AUTO: 7.5 % (ref 5–12)
NEUTROPHILS NFR BLD AUTO: 3.42 10*3/MM3 (ref 1.7–7)
NEUTROPHILS NFR BLD AUTO: 3.89 10*3/MM3 (ref 1.7–7)
NEUTROPHILS NFR BLD AUTO: 58.6 % (ref 42.7–76)
NEUTROPHILS NFR BLD AUTO: 63.7 % (ref 42.7–76)
NITRITE UR QL STRIP: NEGATIVE
NRBC BLD AUTO-RTO: 0 /100 WBC (ref 0–0.2)
NRBC BLD AUTO-RTO: 0 /100 WBC (ref 0–0.2)
PH UR STRIP.AUTO: 8 [PH] (ref 5–8)
PHOSPHATE SERPL-MCNC: 2.6 MG/DL (ref 2.5–4.5)
PHOSPHATE SERPL-MCNC: 2.9 MG/DL (ref 2.5–4.5)
PLATELET # BLD AUTO: 123 10*3/MM3 (ref 140–450)
PLATELET # BLD AUTO: 169 10*3/MM3 (ref 140–450)
PMV BLD AUTO: 11.5 FL (ref 6–12)
PMV BLD AUTO: 12.7 FL (ref 6–12)
POTASSIUM SERPL-SCNC: 4.5 MMOL/L (ref 3.5–5.2)
POTASSIUM SERPL-SCNC: 4.7 MMOL/L (ref 3.5–5.2)
POTASSIUM SERPL-SCNC: 4.7 MMOL/L (ref 3.5–5.2)
POTASSIUM SERPL-SCNC: 5.4 MMOL/L (ref 3.5–5.2)
PROT ?TM UR-MCNC: 38 MG/DL
PROT SERPL-MCNC: 7.5 G/DL (ref 6–8.5)
PROT SERPL-MCNC: 7.6 G/DL (ref 6–8.5)
PROT SERPL-MCNC: 8.4 G/DL (ref 6–8.5)
PROT UR QL STRIP: ABNORMAL
PROT UR-MCNC: 9 MG/DL
PROT/CREAT UR: 334.8 MG/G CREA (ref 0–200)
PROT/CREAT UR: 83.2 MG/G CREA (ref 0–200)
PTH-INTACT SERPL-MCNC: 47.6 PG/ML (ref 15–65)
PTH-INTACT SERPL-MCNC: 76.1 PG/ML (ref 15–65)
RBC # BLD AUTO: 4.01 10*6/MM3 (ref 4.14–5.8)
RBC # BLD AUTO: 4.42 10*6/MM3 (ref 4.14–5.8)
RBC # UR STRIP: NORMAL /HPF
REF LAB TEST METHOD: NORMAL
SODIUM SERPL-SCNC: 141 MMOL/L (ref 136–145)
SODIUM SERPL-SCNC: 142 MMOL/L (ref 136–145)
SP GR UR STRIP: 1.03 (ref 1–1.03)
SQUAMOUS #/AREA URNS HPF: NORMAL /HPF
TRIGL SERPL-MCNC: 72 MG/DL (ref 0–150)
TSH SERPL DL<=0.05 MIU/L-ACNC: 1.5 UIU/ML (ref 0.27–4.2)
UROBILINOGEN UR QL STRIP: ABNORMAL
VLDLC SERPL-MCNC: 14 MG/DL (ref 5–40)
WBC # BLD AUTO: 5.85 10*3/MM3 (ref 3.4–10.8)
WBC # UR STRIP: NORMAL /HPF
WBC NRBC COR # BLD: 6.11 10*3/MM3 (ref 3.4–10.8)

## 2021-01-01 PROCEDURE — 84100 ASSAY OF PHOSPHORUS: CPT

## 2021-01-01 PROCEDURE — G0439 PPPS, SUBSEQ VISIT: HCPCS | Performed by: GENERAL PRACTICE

## 2021-01-01 PROCEDURE — 84156 ASSAY OF PROTEIN URINE: CPT

## 2021-01-01 PROCEDURE — 80053 COMPREHEN METABOLIC PANEL: CPT

## 2021-01-01 PROCEDURE — G0008 ADMIN INFLUENZA VIRUS VAC: HCPCS | Performed by: GENERAL PRACTICE

## 2021-01-01 PROCEDURE — 0002A: CPT | Performed by: THORACIC SURGERY (CARDIOTHORACIC VASCULAR SURGERY)

## 2021-01-01 PROCEDURE — 83970 ASSAY OF PARATHORMONE: CPT

## 2021-01-01 PROCEDURE — 1159F MED LIST DOCD IN RCRD: CPT | Performed by: GENERAL PRACTICE

## 2021-01-01 PROCEDURE — 99214 OFFICE O/P EST MOD 30 MIN: CPT | Performed by: GENERAL PRACTICE

## 2021-01-01 PROCEDURE — 82570 ASSAY OF URINE CREATININE: CPT

## 2021-01-01 PROCEDURE — 80053 COMPREHEN METABOLIC PANEL: CPT | Performed by: GENERAL PRACTICE

## 2021-01-01 PROCEDURE — 83721 ASSAY OF BLOOD LIPOPROTEIN: CPT | Performed by: GENERAL PRACTICE

## 2021-01-01 PROCEDURE — 36415 COLL VENOUS BLD VENIPUNCTURE: CPT | Performed by: INTERNAL MEDICINE

## 2021-01-01 PROCEDURE — 83036 HEMOGLOBIN GLYCOSYLATED A1C: CPT | Performed by: GENERAL PRACTICE

## 2021-01-01 PROCEDURE — 90662 IIV NO PRSV INCREASED AG IM: CPT | Performed by: GENERAL PRACTICE

## 2021-01-01 PROCEDURE — 81001 URINALYSIS AUTO W/SCOPE: CPT

## 2021-01-01 PROCEDURE — 36415 COLL VENOUS BLD VENIPUNCTURE: CPT

## 2021-01-01 PROCEDURE — 91300 HC SARSCOV02 VAC 30MCG/0.3ML IM: CPT | Performed by: THORACIC SURGERY (CARDIOTHORACIC VASCULAR SURGERY)

## 2021-01-01 PROCEDURE — 1126F AMNT PAIN NOTED NONE PRSNT: CPT | Performed by: GENERAL PRACTICE

## 2021-01-01 PROCEDURE — 36415 COLL VENOUS BLD VENIPUNCTURE: CPT | Performed by: GENERAL PRACTICE

## 2021-01-01 PROCEDURE — 80048 BASIC METABOLIC PNL TOTAL CA: CPT | Performed by: INTERNAL MEDICINE

## 2021-01-01 PROCEDURE — 85025 COMPLETE CBC W/AUTO DIFF WBC: CPT

## 2021-01-01 PROCEDURE — 85025 COMPLETE CBC W/AUTO DIFF WBC: CPT | Performed by: GENERAL PRACTICE

## 2021-01-01 PROCEDURE — 1170F FXNL STATUS ASSESSED: CPT | Performed by: GENERAL PRACTICE

## 2021-01-01 PROCEDURE — 84443 ASSAY THYROID STIM HORMONE: CPT

## 2021-01-01 PROCEDURE — 83036 HEMOGLOBIN GLYCOSYLATED A1C: CPT

## 2021-01-01 PROCEDURE — 80061 LIPID PANEL: CPT

## 2021-01-01 PROCEDURE — 80162 ASSAY OF DIGOXIN TOTAL: CPT

## 2021-01-01 RX ORDER — PRAVASTATIN SODIUM 40 MG
TABLET ORAL
Qty: 90 TABLET | Refills: 3 | Status: SHIPPED | OUTPATIENT
Start: 2021-01-01 | End: 2022-01-01

## 2021-01-01 RX ORDER — CALCITRIOL 0.25 UG/1
CAPSULE, LIQUID FILLED ORAL
Qty: 36 CAPSULE | Refills: 2 | Status: SHIPPED | OUTPATIENT
Start: 2021-01-01

## 2021-01-01 RX ORDER — WARFARIN SODIUM 3 MG/1
3 TABLET ORAL
COMMUNITY
End: 2022-01-01 | Stop reason: ALTCHOICE

## 2021-01-01 RX ORDER — DEXTROSE AND SODIUM CHLORIDE 5; .45 G/100ML; G/100ML
100 INJECTION, SOLUTION INTRAVENOUS CONTINUOUS PRN
Status: CANCELLED | OUTPATIENT
Start: 2022-01-01

## 2021-01-01 RX ORDER — TAMSULOSIN HYDROCHLORIDE 0.4 MG/1
CAPSULE ORAL
Qty: 90 CAPSULE | Refills: 1 | Status: SHIPPED | OUTPATIENT
Start: 2021-01-01 | End: 2022-01-01

## 2021-01-04 RX ORDER — WARFARIN SODIUM 3 MG/1
TABLET ORAL
Qty: 94 TABLET | Refills: 3 | Status: SHIPPED | OUTPATIENT
Start: 2021-01-04 | End: 2022-01-01 | Stop reason: ALTCHOICE

## 2021-01-04 NOTE — PROGRESS NOTES
Subjective   Laci Pruett is a 80 y.o. male.   Chief Complaint   Patient presents with   • Diabetes   • Medicare Wellness-subsequent     For review and evaluation of management of chronic medical problems. Labs reviewed.   Diabetes  He presents for his follow-up diabetic visit. He has type 2 diabetes mellitus. His disease course has been stable. Pertinent negatives for hypoglycemia include no dizziness, headaches or nervousness/anxiousness. There are no diabetic associated symptoms. Pertinent negatives for diabetes include no chest pain, no fatigue and no weakness. There are no hypoglycemic complications. Symptoms are stable. Diabetic complications include nephropathy. Risk factors for coronary artery disease include diabetes mellitus, dyslipidemia and hypertension. Current diabetic treatment includes oral agent (dual therapy). He is compliant with treatment most of the time. His weight is stable. He is following a generally healthy diet. When asked about meal planning, he reported none. He has not had a previous visit with a dietitian. He never participates in exercise. Home blood sugar record trend: does not check regularly. An ACE inhibitor/angiotensin II receptor blocker is being taken. Eye exam is current (Dr. Anderson).   Hyperlipidemia  This is a chronic problem. The current episode started more than 1 year ago. The problem is controlled. Recent lipid tests were reviewed and are normal. Exacerbating diseases include diabetes and hypothyroidism. Pertinent negatives include no chest pain, myalgias or shortness of breath. Current antihyperlipidemic treatment includes statins. The current treatment provides significant improvement of lipids. There are no compliance problems.    Hypertension  This is a chronic problem. The current episode started more than 1 year ago. The problem has been gradually improving since onset. The problem is controlled. Associated symptoms include anxiety. Pertinent negatives include  no chest pain, headaches, neck pain, palpitations or shortness of breath. There are no associated agents to hypertension. Risk factors for coronary artery disease include diabetes mellitus, dyslipidemia, male gender and stress. Current antihypertension treatment includes calcium channel blockers, ACE inhibitors, beta blockers, diuretics and angiotensin blockers. The current treatment provides significant improvement. There are no compliance problems.    Hypothyroidism  This is a chronic problem. The current episode started more than 1 year ago. The problem occurs constantly. The problem has been unchanged. Pertinent negatives include no abdominal pain, arthralgias, chest pain, chills, congestion, coughing, fatigue, fever, headaches, joint swelling, myalgias, nausea, neck pain, numbness, rash, sore throat, vomiting or weakness. Nothing aggravates the symptoms. Treatments tried: levothyroxine. The treatment provided significant relief.   Back Pain  This is a chronic problem. The current episode started more than 1 year ago. The problem occurs constantly. The problem is unchanged. The pain is present in the lumbar spine. The pain is at a severity of 5/10. The pain is moderate. The pain is worse during the night. Pertinent negatives include no abdominal pain, chest pain, fever, headaches, numbness or weakness.      The following portions of the patient's history were reviewed and updated as appropriate: allergies, current medications, past social history and problem list.    Outpatient Medications Prior to Visit   Medication Sig Dispense Refill   • aspirin 81 MG tablet Take 1 tablet by mouth Daily. 100 tablet 3   • calcitriol (ROCALTROL) 0.25 MCG capsule TAKE 1 CAPSULE MONDAY,     WEDNESDAY, FRIDAY 36 capsule 2   • carvedilol (COREG) 6.25 MG tablet Take 1 tablet by mouth 2 (Two) Times a Day With Meals. 180 tablet 3   • cholecalciferol (VITAMIN D3) 1000 units tablet Take 1,000 Units by mouth Daily.     • Digoxin 62.5 MCG  tablet Take 62.5 mcg by mouth Daily. 90 tablet 3   • docusate sodium (COLACE) 50 MG capsule Take 1 capsule by mouth.     • folic acid (FOLVITE) 800 MCG tablet Take 1 tablet by mouth Daily. 30 tablet 0   • furosemide (LASIX) 40 MG tablet Take 1 tablet by mouth Daily As Needed (for weight gain or swelling). (Patient taking differently: Take 40 mg by mouth Daily.) 90 tablet 3   • glipizide (GLUCOTROL) 5 MG tablet TAKE 1 TABLET TWICE DAILY  BEFORE MEALS 60 tablet 5   • levothyroxine (SYNTHROID) 50 MCG tablet Take 1 tablet by mouth Daily. ID # is [id number] 90 tablet 3   • Multiple Vitamin (VITAMIN E/FOLIC ACID/B-6/B-12) capsule Take  by mouth.     • nitroglycerin (NITROSTAT) 0.4 MG SL tablet Place 1 tablet under the tongue Every 5 (Five) Minutes As Needed for Chest Pain. Take no more than 3 doses in 15 minutes. 75 tablet 3   • omeprazole (priLOSEC) 20 MG capsule Take 1 capsule by mouth Daily. 90 capsule 3   • pravastatin (PRAVACHOL) 40 MG tablet Take 1 tablet by mouth Every Night. 90 tablet 3   • sacubitril-valsartan (Entresto) 49-51 MG tablet Take 1 tablet by mouth 2 (Two) Times a Day. 180 tablet 3   • SITagliptin (Januvia) 50 MG tablet Take 1 tablet by mouth Daily. 30 tablet 2   • spironolactone (ALDACTONE) 25 MG tablet Take 0.5 tablets by mouth 3 (Three) Times a Week. 20 tablet 3   • tamsulosin (FLOMAX) 0.4 MG capsule 24 hr capsule TAKE 1 CAPSULE NIGHTLY AT  BEDTIME 90 capsule 1   • vitamin B-12 (CYANOCOBALAMIN) 100 MCG tablet Take 50 mcg by mouth Daily.     • vitamin B-6 (PYRIDOXINE) 50 MG tablet Take 50 mg by mouth Daily.     • warfarin (COUMADIN) 3 MG tablet Take 1 tablet by mouth Daily. Except 2 tabs on wednesday 94 tablet 3     No facility-administered medications prior to visit.        Review of Systems   Constitutional: Negative for chills, fatigue and fever.   HENT: Negative for congestion and sore throat.    Respiratory: Negative for cough and shortness of breath.    Cardiovascular: Negative for chest  "pain and palpitations.   Gastrointestinal: Negative for abdominal pain, nausea and vomiting.   Musculoskeletal: Positive for back pain. Negative for arthralgias, joint swelling, myalgias and neck pain.   Skin: Negative for rash.   Neurological: Negative for dizziness, weakness, numbness and headaches.   Psychiatric/Behavioral: The patient is not nervous/anxious.        Objective   Visit Vitals  /58 (BP Location: Left arm)   Pulse 71   Ht 170.2 cm (67\")   Wt 74.8 kg (165 lb)   SpO2 95%   BMI 25.84 kg/m²     Physical Exam    Notes brought forward are reviewed and updated if indicated.     Assessment/Plan   Problems Addressed this Visit        Cardiac and Vasculature    Chronic atrial fibrillation (CMS/HCC) (Chronic)    Relevant Orders    CBC & Differential (Completed)    Comprehensive Metabolic Panel (Completed)    Hemoglobin A1c (Completed)    LDL Cholesterol, Direct (Completed)    CBC Auto Differential (Completed)    Essential hypertension (Chronic)    Relevant Orders    CBC & Differential (Completed)    Comprehensive Metabolic Panel (Completed)    Hemoglobin A1c (Completed)    LDL Cholesterol, Direct (Completed)    CBC Auto Differential (Completed)       Endocrine and Metabolic    Acquired hypothyroidism (Chronic)    Relevant Orders    CBC & Differential (Completed)    Comprehensive Metabolic Panel (Completed)    Hemoglobin A1c (Completed)    LDL Cholesterol, Direct (Completed)    TSH (Completed)    T4, Free (Completed)    CBC Auto Differential (Completed)    Type 2 diabetes mellitus with complication, without long-term current use of insulin (CMS/HCC) (Chronic)    Relevant Orders    CBC & Differential (Completed)    Comprehensive Metabolic Panel (Completed)    Hemoglobin A1c (Completed)    LDL Cholesterol, Direct (Completed)    CBC Auto Differential (Completed)      Other Visit Diagnoses     Medicare annual wellness visit, subsequent    -  Primary    Need for hepatitis C screening test        Relevant Orders "    Hepatitis C antibody (Completed)    Need for influenza vaccination        Relevant Orders    Fluad Quad 65+ yrs ([id number]) (Completed)      Diagnoses       Codes Comments    Medicare annual wellness visit, subsequent    -  Primary ICD-10-CM: Z00.00  ICD-9-CM: V70.0     Essential hypertension     ICD-10-CM: I10  ICD-9-CM: 401.9     Chronic atrial fibrillation (CMS/HCC)     ICD-10-CM: I48.20  ICD-9-CM: 427.31     Type 2 diabetes mellitus with complication, without long-term current use of insulin (CMS/HCC)     ICD-10-CM: E11.8  ICD-9-CM: 250.90     Acquired hypothyroidism     ICD-10-CM: E03.9  ICD-9-CM: 244.9     Need for hepatitis C screening test     ICD-10-CM: Z11.59  ICD-9-CM: V73.89     Need for influenza vaccination     ICD-10-CM: Z23  ICD-9-CM: V04.81           Continue current treatment.     No orders of the defined types were placed in this encounter.    Return in about 6 months (around 6/15/2021) for Recheck.

## 2021-01-18 DIAGNOSIS — I48.20 CHRONIC ATRIAL FIBRILLATION (HCC): ICD-10-CM

## 2021-01-18 DIAGNOSIS — I10 ESSENTIAL HYPERTENSION: Chronic | ICD-10-CM

## 2021-01-19 RX ORDER — CARVEDILOL 6.25 MG/1
TABLET ORAL
Qty: 180 TABLET | Refills: 3 | Status: SHIPPED | OUTPATIENT
Start: 2021-01-19 | End: 2021-04-15 | Stop reason: SDUPTHER

## 2021-02-02 RX ORDER — OMEPRAZOLE 20 MG/1
CAPSULE, DELAYED RELEASE ORAL
Qty: 90 CAPSULE | Refills: 3 | Status: SHIPPED | OUTPATIENT
Start: 2021-02-02 | End: 2022-01-01 | Stop reason: SDUPTHER

## 2021-02-19 DIAGNOSIS — E03.9 ACQUIRED HYPOTHYROIDISM: Chronic | ICD-10-CM

## 2021-02-22 RX ORDER — LEVOTHYROXINE SODIUM 50 MCG
TABLET ORAL
Qty: 90 TABLET | Refills: 3 | Status: SHIPPED | OUTPATIENT
Start: 2021-02-22 | End: 2022-01-01

## 2021-03-11 DIAGNOSIS — I42.0 DILATED CARDIOMYOPATHY (HCC): Chronic | ICD-10-CM

## 2021-03-11 RX ORDER — SPIRONOLACTONE 25 MG/1
TABLET ORAL
Qty: 20 TABLET | Refills: 3 | Status: SHIPPED | OUTPATIENT
Start: 2021-03-11 | End: 2022-01-01

## 2021-03-23 RX ORDER — TAMSULOSIN HYDROCHLORIDE 0.4 MG/1
CAPSULE ORAL
Qty: 90 CAPSULE | Refills: 1 | Status: SHIPPED | OUTPATIENT
Start: 2021-03-23 | End: 2021-01-01

## 2021-03-29 ENCOUNTER — LAB (OUTPATIENT)
Dept: LAB | Facility: HOSPITAL | Age: 81
End: 2021-03-29

## 2021-03-29 ENCOUNTER — TRANSCRIBE ORDERS (OUTPATIENT)
Dept: LAB | Facility: HOSPITAL | Age: 81
End: 2021-03-29

## 2021-03-29 DIAGNOSIS — N18.32 CHRONIC KIDNEY DISEASE (CKD) STAGE G3B/A1, MODERATELY DECREASED GLOMERULAR FILTRATION RATE (GFR) BETWEEN 30-44 ML/MIN/1.73 SQUARE METER AND ALBUMINURIA CREATININE RATIO LESS THAN 30 MG/G (CMS/H* (HCC): Primary | ICD-10-CM

## 2021-03-29 LAB
ALBUMIN SERPL-MCNC: 4.3 G/DL (ref 3.5–5.2)
ANION GAP SERPL CALCULATED.3IONS-SCNC: 9 MMOL/L (ref 5–15)
BUN SERPL-MCNC: 32 MG/DL (ref 8–23)
BUN/CREAT SERPL: 23.4 (ref 7–25)
CALCIUM SPEC-SCNC: 9.6 MG/DL (ref 8.6–10.5)
CHLORIDE SERPL-SCNC: 100 MMOL/L (ref 98–107)
CO2 SERPL-SCNC: 31 MMOL/L (ref 22–29)
CREAT SERPL-MCNC: 1.37 MG/DL (ref 0.76–1.27)
CREAT UR-MCNC: 40.6 MG/DL
GFR SERPL CREATININE-BSD FRML MDRD: 50 ML/MIN/1.73
GLUCOSE SERPL-MCNC: 213 MG/DL (ref 65–99)
PHOSPHATE SERPL-MCNC: 2.7 MG/DL (ref 2.5–4.5)
POTASSIUM SERPL-SCNC: 4.9 MMOL/L (ref 3.5–5.2)
PROT UR-MCNC: 8 MG/DL
PROT/CREAT UR: 197 MG/G CREA (ref 0–200)
SODIUM SERPL-SCNC: 140 MMOL/L (ref 136–145)

## 2021-03-29 PROCEDURE — 80069 RENAL FUNCTION PANEL: CPT | Performed by: INTERNAL MEDICINE

## 2021-03-29 PROCEDURE — 36415 COLL VENOUS BLD VENIPUNCTURE: CPT | Performed by: INTERNAL MEDICINE

## 2021-03-29 PROCEDURE — 84156 ASSAY OF PROTEIN URINE: CPT | Performed by: INTERNAL MEDICINE

## 2021-03-29 PROCEDURE — 82570 ASSAY OF URINE CREATININE: CPT | Performed by: INTERNAL MEDICINE

## 2021-04-02 ENCOUNTER — OFFICE VISIT (OUTPATIENT)
Dept: FAMILY MEDICINE CLINIC | Facility: CLINIC | Age: 81
End: 2021-04-02

## 2021-04-02 VITALS
SYSTOLIC BLOOD PRESSURE: 120 MMHG | HEART RATE: 70 BPM | WEIGHT: 163 LBS | DIASTOLIC BLOOD PRESSURE: 63 MMHG | HEIGHT: 67 IN | BODY MASS INDEX: 25.58 KG/M2 | OXYGEN SATURATION: 98 %

## 2021-04-02 DIAGNOSIS — E11.8 TYPE 2 DIABETES MELLITUS WITH COMPLICATION, WITHOUT LONG-TERM CURRENT USE OF INSULIN (HCC): Chronic | ICD-10-CM

## 2021-04-02 DIAGNOSIS — L72.3 SEBACEOUS CYST OF RIGHT AXILLA: Primary | ICD-10-CM

## 2021-04-02 PROCEDURE — 99213 OFFICE O/P EST LOW 20 MIN: CPT | Performed by: GENERAL PRACTICE

## 2021-04-02 RX ORDER — MUPIROCIN CALCIUM 20 MG/G
CREAM TOPICAL 2 TIMES DAILY
Qty: 30 G | Refills: 0 | Status: SHIPPED | OUTPATIENT
Start: 2021-04-02 | End: 2022-01-01

## 2021-04-02 NOTE — PROGRESS NOTES
Subjective   Laci Pruett is a 81 y.o. male.   Chief Complaint   Patient presents with   • Cyst     right armpit     History of Present Illness     Noticed a small knot under right axilla yesterday, was a bit tender. Cleaned it with H2O2 and is a little better. The patient denies fever or chills. Records reviewed. Recent labs, xrays reviewed and medications reconciled. Also needs a new pair of diabetic shoes.     The following portions of the patient's history were reviewed and updated as appropriate: allergies, current medications, past social history and problem list.    Outpatient Medications Prior to Visit   Medication Sig Dispense Refill   • aspirin 81 MG tablet Take 1 tablet by mouth Daily. 100 tablet 3   • calcitriol (ROCALTROL) 0.25 MCG capsule TAKE 1 CAPSULE MONDAY,     WEDNESDAY, FRIDAY 36 capsule 2   • carvedilol (COREG) 6.25 MG tablet TAKE 1 TABLET TWICE DAILY  WITH MEALS 180 tablet 3   • cholecalciferol (VITAMIN D3) 1000 units tablet Take 1,000 Units by mouth Daily.     • Digoxin 62.5 MCG tablet Take 62.5 mcg by mouth Daily. 90 tablet 3   • docusate sodium (COLACE) 50 MG capsule Take 1 capsule by mouth.     • folic acid (FOLVITE) 800 MCG tablet Take 1 tablet by mouth Daily. 30 tablet 0   • furosemide (LASIX) 40 MG tablet Take 1 tablet by mouth Daily As Needed (for weight gain or swelling). (Patient taking differently: Take 40 mg by mouth Daily.) 90 tablet 3   • glipizide (GLUCOTROL) 5 MG tablet TAKE 1 TABLET TWICE DAILY  BEFORE MEALS 60 tablet 5   • Jantoven 3 MG tablet TAKE 1 TABLET DAILY EXCEPT 2 TABLETS ON WEDNESDAY 94 tablet 3   • Multiple Vitamin (VITAMIN E/FOLIC ACID/B-6/B-12) capsule Take  by mouth.     • nitroglycerin (NITROSTAT) 0.4 MG SL tablet Place 1 tablet under the tongue Every 5 (Five) Minutes As Needed for Chest Pain. Take no more than 3 doses in 15 minutes. 75 tablet 3   • omeprazole (priLOSEC) 20 MG capsule TAKE 1 CAPSULE DAILY 90 capsule 3   • pravastatin (PRAVACHOL) 40 MG tablet  "Take 1 tablet by mouth Every Night. 90 tablet 3   • sacubitril-valsartan (Entresto) 49-51 MG tablet Take 1 tablet by mouth 2 (Two) Times a Day. 180 tablet 3   • SITagliptin (Januvia) 50 MG tablet Take 1 tablet by mouth Daily. 30 tablet 2   • spironolactone (ALDACTONE) 25 MG tablet TAKE 1/2 TABLET THREE TIMESWEEKLY 20 tablet 3   • Synthroid 50 MCG tablet TAKE 1 TABLET DAILY 90 tablet 3   • tamsulosin (FLOMAX) 0.4 MG capsule 24 hr capsule TAKE 1 CAPSULE AT BEDTIME 90 capsule 1   • vitamin B-12 (CYANOCOBALAMIN) 100 MCG tablet Take 50 mcg by mouth Daily.     • vitamin B-6 (PYRIDOXINE) 50 MG tablet Take 50 mg by mouth Daily.       No facility-administered medications prior to visit.     I have reviewed 12 systems with patient. Findings were negative except what is noted below and/or in history of present illness.   Review of Systems    Objective   Visit Vitals  /63 (BP Location: Left arm)   Pulse 70   Ht 170.2 cm (67\")   Wt 73.9 kg (163 lb)   SpO2 98%   BMI 25.53 kg/m²     Physical Exam  Vitals and nursing note reviewed.   Constitutional:       General: He is not in acute distress.     Appearance: He is well-developed.   HENT:      Head: Normocephalic.      Nose: Nose normal.   Eyes:      General:         Right eye: No discharge.         Left eye: No discharge.      Conjunctiva/sclera: Conjunctivae normal.      Pupils: Pupils are equal, round, and reactive to light.   Neck:      Thyroid: No thyromegaly.   Cardiovascular:      Rate and Rhythm: Normal rate and regular rhythm.      Heart sounds: Normal heart sounds. No murmur heard.     Pulmonary:      Effort: Pulmonary effort is normal.      Breath sounds: Normal breath sounds.   Chest:       Musculoskeletal:      Right foot: Deformity and bunion present.      Left foot: Deformity and bunion present.   Feet:      Right foot:      Protective Sensation: 5 sites tested. 4 sites sensed.      Skin integrity: Callus present.      Left foot:      Protective Sensation: 5 " sites tested. 5 sites sensed.      Skin integrity: Callus present.   Lymphadenopathy:      Cervical: No cervical adenopathy.   Skin:     General: Skin is warm and dry.   Neurological:      Mental Status: He is alert and oriented to person, place, and time.         Notes brought forward are reviewed and updated if indicated.     Assessment/Plan   Problems Addressed this Visit        Endocrine and Metabolic    Type 2 diabetes mellitus with complication, without long-term current use of insulin (CMS/[facility]) (Chronic)      Other Visit Diagnoses     Sebaceous cyst of right axilla    -  Primary    Relevant Medications    mupirocin (Bactroban) 2 % cream      Diagnoses       Codes Comments    Sebaceous cyst of right axilla    -  Primary ICD-10-CM: L72.3  ICD-9-CM: 706.2     Type 2 diabetes mellitus with complication, without long-term current use of insulin (CMS/[facility])     ICD-10-CM: E11.8  ICD-9-CM: 250.90          Cleanse with antibacterial soap and water. Bactroban bid. Recheck if not improving.  Prescription given for shoes.   New Medications Ordered This Visit   Medications   • mupirocin (Bactroban) 2 % cream     Sig: Apply  topically to the appropriate area as directed 2 (Two) Times a Day.     Dispense:  30 g     Refill:  0     Return if symptoms worsen or fail to improve, for Next scheduled follow up.

## 2021-04-05 ENCOUNTER — IMMUNIZATION (OUTPATIENT)
Dept: VACCINE CLINIC | Facility: HOSPITAL | Age: 81
End: 2021-04-05

## 2021-04-05 PROCEDURE — 0001A: CPT | Performed by: THORACIC SURGERY (CARDIOTHORACIC VASCULAR SURGERY)

## 2021-04-05 PROCEDURE — 91300 HC SARSCOV02 VAC 30MCG/0.3ML IM: CPT | Performed by: THORACIC SURGERY (CARDIOTHORACIC VASCULAR SURGERY)

## 2021-04-15 DIAGNOSIS — I48.20 CHRONIC ATRIAL FIBRILLATION (HCC): ICD-10-CM

## 2021-04-15 DIAGNOSIS — I10 ESSENTIAL HYPERTENSION: Chronic | ICD-10-CM

## 2021-04-15 RX ORDER — CARVEDILOL 6.25 MG/1
6.25 TABLET ORAL 2 TIMES DAILY WITH MEALS
Qty: 180 TABLET | Refills: 3 | Status: SHIPPED | OUTPATIENT
Start: 2021-04-15 | End: 2022-01-01 | Stop reason: SDUPTHER

## 2021-04-15 NOTE — TELEPHONE ENCOUNTER
PT NEEDS THIS SCRIPT SENT TO       [facility] - [place], [place] - [address] - [phone]  - [phone] FX

## 2021-06-15 NOTE — PROGRESS NOTES
Subjective   Laci Pruett is a 81 y.o. male.   Chief Complaint   Patient presents with   • Diabetes     For review and evaluation of management of chronic medical problems. Records reviewed. Recent labs, xrays reviewed and medications reconciled. Labs pending.    Diabetes  He presents for his follow-up diabetic visit. He has type 2 diabetes mellitus. His disease course has been stable. Pertinent negatives for hypoglycemia include no dizziness or nervousness/anxiousness. There are no diabetic associated symptoms. There are no hypoglycemic complications. Symptoms are stable. Diabetic complications include nephropathy. Risk factors for coronary artery disease include diabetes mellitus, dyslipidemia and hypertension. Current diabetic treatment includes oral agent (dual therapy). He is compliant with treatment most of the time. His weight is stable. He is following a generally healthy diet. When asked about meal planning, he reported none. He has not had a previous visit with a dietitian. He never participates in exercise. Home blood sugar record trend: does not check regularly. An ACE inhibitor/angiotensin II receptor blocker is being taken. Eye exam is current (Dr. Anderson).   Hyperlipidemia  This is a chronic problem. The current episode started more than 1 year ago. The problem is controlled. Recent lipid tests were reviewed and are normal. Exacerbating diseases include diabetes. Pertinent negatives include no shortness of breath. Current antihyperlipidemic treatment includes statins. The current treatment provides significant improvement of lipids. There are no compliance problems.    Hypertension  This is a chronic problem. The current episode started more than 1 year ago. The problem has been gradually improving since onset. The problem is controlled. Associated symptoms include anxiety. Pertinent negatives include no palpitations or shortness of breath. There are no associated agents to hypertension. Risk  factors for coronary artery disease include diabetes mellitus, dyslipidemia, male gender and stress. Current antihypertension treatment includes beta blockers, diuretics and angiotensin blockers. The current treatment provides significant improvement. There are no compliance problems.       The following portions of the patient's history were reviewed and updated as appropriate: allergies, current medications, past social history and problem list.    Outpatient Medications Prior to Visit   Medication Sig Dispense Refill   • aspirin 81 MG tablet Take 1 tablet by mouth Daily. 100 tablet 3   • calcitriol (ROCALTROL) 0.25 MCG capsule TAKE 1 CAPSULE MONDAY,     WEDNESDAY, FRIDAY 36 capsule 2   • carvedilol (COREG) 6.25 MG tablet Take 1 tablet by mouth 2 (Two) Times a Day With Meals. 180 tablet 3   • cholecalciferol (VITAMIN D3) 1000 units tablet Take 1,000 Units by mouth Daily.     • docusate sodium (COLACE) 50 MG capsule Take 1 capsule by mouth.     • folic acid (FOLVITE) 800 MCG tablet Take 1 tablet by mouth Daily. 30 tablet 0   • furosemide (LASIX) 40 MG tablet Take 1 tablet by mouth Daily As Needed (for weight gain or swelling). (Patient taking differently: Take 40 mg by mouth Daily.) 90 tablet 3   • glipizide (GLUCOTROL) 5 MG tablet TAKE 1 TABLET TWICE DAILY  BEFORE MEALS 60 tablet 5   • Jantoven 3 MG tablet TAKE 1 TABLET DAILY EXCEPT 2 TABLETS ON WEDNESDAY 94 tablet 3   • Multiple Vitamin (VITAMIN E/FOLIC ACID/B-6/B-12) capsule Take  by mouth.     • mupirocin (Bactroban) 2 % cream Apply  topically to the appropriate area as directed 2 (Two) Times a Day. 30 g 0   • nitroglycerin (NITROSTAT) 0.4 MG SL tablet Place 1 tablet under the tongue Every 5 (Five) Minutes As Needed for Chest Pain. Take no more than 3 doses in 15 minutes. 75 tablet 3   • omeprazole (priLOSEC) 20 MG capsule TAKE 1 CAPSULE DAILY 90 capsule 3   • pravastatin (PRAVACHOL) 40 MG tablet TAKE 1 TABLET EVERY NIGHT 90 tablet 3   • sacubitril-valsartan  "(Entresto) 49-51 MG tablet Take 1 tablet by mouth 2 (Two) Times a Day. 180 tablet 3   • SITagliptin (Januvia) 50 MG tablet Take 1 tablet by mouth Daily. 30 tablet 2   • spironolactone (ALDACTONE) 25 MG tablet TAKE 1/2 TABLET THREE TIMESWEEKLY 20 tablet 3   • Synthroid 50 MCG tablet TAKE 1 TABLET DAILY 90 tablet 3   • tamsulosin (FLOMAX) 0.4 MG capsule 24 hr capsule TAKE 1 CAPSULE AT BEDTIME 90 capsule 1   • vitamin B-12 (CYANOCOBALAMIN) 100 MCG tablet Take 50 mcg by mouth Daily.     • vitamin B-6 (PYRIDOXINE) 50 MG tablet Take 50 mg by mouth Daily.       No facility-administered medications prior to visit.     I have reviewed 12 systems with patient. Findings were negative except what is noted below and/or in history of present illness.   Review of Systems   Respiratory: Negative for shortness of breath.    Cardiovascular: Negative for palpitations.   Neurological: Negative for dizziness.   Psychiatric/Behavioral: The patient is not nervous/anxious.        Objective   Visit Vitals  /60 (BP Location: Left arm)   Pulse 71   Ht 170.2 cm (67\")   Wt 73 kg (161 lb)   SpO2 96%   BMI 25.22 kg/m²     Physical Exam  Vitals and nursing note reviewed.   Constitutional:       General: He is not in acute distress.     Appearance: He is well-developed.   HENT:      Head: Normocephalic.      Nose: Nose normal.   Eyes:      General:         Right eye: No discharge.         Left eye: No discharge.      Conjunctiva/sclera: Conjunctivae normal.      Pupils: Pupils are equal, round, and reactive to light.   Neck:      Thyroid: No thyromegaly.   Cardiovascular:      Rate and Rhythm: Normal rate and regular rhythm.      Heart sounds: Normal heart sounds. No murmur heard.     Pulmonary:      Effort: Pulmonary effort is normal.      Breath sounds: Normal breath sounds.   Lymphadenopathy:      Cervical: No cervical adenopathy.   Skin:     General: Skin is warm and dry.   Neurological:      Mental Status: He is alert and oriented to " person, place, and time.       Notes brought forward are reviewed and updated if indicated.     Assessment/Plan   Problems Addressed this Visit        Cardiac and Vasculature    Essential hypertension (Chronic)    Mixed hyperlipidemia (Chronic)       Endocrine and Metabolic    Type 2 diabetes mellitus with complication, without long-term current use of insulin (CMS/[facility]) - Primary (Chronic)    Relevant Orders    Hemoglobin A1c    Comprehensive Metabolic Panel    CBC & Differential    LDL Cholesterol, Direct      Diagnoses       Codes Comments    Type 2 diabetes mellitus with complication, without long-term current use of insulin (CMS/[facility])    -  Primary ICD-10-CM: E11.8  ICD-9-CM: 250.90     Essential hypertension     ICD-10-CM: I10  ICD-9-CM: 401.9     Mixed hyperlipidemia     ICD-10-CM: E78.2  ICD-9-CM: 272.2          Will notify regarding results. Check on med list. Continue current treatment.     No orders of the defined types were placed in this encounter.    Return in about 6 months (around 12/16/2021) for medicare wellness visit.        This document has been electronically signed by Jade Barnett MD on Randee 15, 2021 16:35 CDT

## 2021-09-22 NOTE — PROGRESS NOTES
Per Dr. Barnett, Mr. Pruett has been called with recent lab results & recommendations.  Continue current medications and follow-up as planned or sooner if any problems.     Hew has an appt with Dr. Mckay on Monday 09/07/2021, I did fax labs to Dr. Mckay

## 2021-09-22 NOTE — TELEPHONE ENCOUNTER
Per Dr. Barnett, Mr. Pruett has been called with recent lab results & recommendations.  Continue current medications and follow-up as planned or sooner if any problems.     Hew has an appt with Dr. Mckay on Monday 09/07/2021, I did fax labs to Dr. Mckay      ----- Message from Jade Barnett MD sent at 9/21/2021  5:13 PM CDT -----  Call and tell labs are okay except his kidney function is worse.  Make sure he has a follow-up with Dr. Mckay.

## 2021-12-17 PROBLEM — Z12.11 SCREEN FOR COLON CANCER: Status: ACTIVE | Noted: 2021-01-01

## 2021-12-20 NOTE — PROGRESS NOTES
The ABCs of the Annual Wellness Visit  Subsequent Medicare Wellness Visit    Chief Complaint   Patient presents with   • Medicare Wellness-subsequent   • Diabetes      Subjective    History of Present Illness:  Laci Pruett is a 81 y.o. male who presents for a Subsequent Medicare Wellness Visit. Due for colonoscopy and is scheduled.     The following portions of the patient's history were reviewed and   updated as appropriate: allergies, current medications, past family history, past medical history, past social history, past surgical history and problem list.    Compared to one year ago, the patient feels his physical   health is worse.    Compared to one year ago, the patient feels his mental   health is the same.    Recent Hospitalizations:  He was not admitted to the hospital during the last year.       Current Medical Providers:  Patient Care Team:  Jade Barnett MD as PCP - General  [place], Laci ANDRADE MD as Consulting Physician (Urology)  Woody Campbell MD as Consulting Physician (Nephrology)  Bryn Nevarez MD as Consulting Physician (Interventional Cardiology)    Outpatient Medications Prior to Visit   Medication Sig Dispense Refill   • aspirin 81 MG tablet Take 1 tablet by mouth Daily. 100 tablet 3   • calcitriol (ROCALTROL) 0.25 MCG capsule TAKE 1 CAPSULE MONDAY,     WEDNESDAY, FRIDAY 36 capsule 2   • carvedilol (COREG) 6.25 MG tablet Take 1 tablet by mouth 2 (Two) Times a Day With Meals. 180 tablet 3   • cholecalciferol (VITAMIN D3) 1000 units tablet Take 1,000 Units by mouth Daily.     • docusate sodium (COLACE) 50 MG capsule Take 1 capsule by mouth.     • folic acid (FOLVITE) 800 MCG tablet Take 1 tablet by mouth Daily. 30 tablet 0   • furosemide (LASIX) 40 MG tablet Take 1 tablet by mouth Daily As Needed (for weight gain or swelling). (Patient taking differently: Take 40 mg by mouth Daily.) 90 tablet 3   • glipizide (GLUCOTROL) 5 MG tablet TAKE 1 TABLET TWICE DAILY  BEFORE MEALS 60  tablet 5   • Jantoven 3 MG tablet TAKE 1 TABLET DAILY EXCEPT 2 TABLETS ON WEDNESDAY 94 tablet 3   • Multiple Vitamin (VITAMIN E/FOLIC ACID/B-6/B-12) capsule Take  by mouth.     • mupirocin (Bactroban) 2 % cream Apply  topically to the appropriate area as directed 2 (Two) Times a Day. 30 g 0   • nitroglycerin (NITROSTAT) 0.4 MG SL tablet Place 1 tablet under the tongue Every 5 (Five) Minutes As Needed for Chest Pain. Take no more than 3 doses in 15 minutes. 75 tablet 3   • omeprazole (priLOSEC) 20 MG capsule TAKE 1 CAPSULE DAILY 90 capsule 3   • pravastatin (PRAVACHOL) 40 MG tablet TAKE 1 TABLET EVERY NIGHT 90 tablet 3   • sacubitril-valsartan (Entresto) 49-51 MG tablet Take 1 tablet by mouth 2 (Two) Times a Day. 180 tablet 3   • SITagliptin (Januvia) 50 MG tablet Take 1 tablet by mouth Daily. 30 tablet 2   • spironolactone (ALDACTONE) 25 MG tablet TAKE 1/2 TABLET THREE TIMESWEEKLY 20 tablet 3   • Synthroid 50 MCG tablet TAKE 1 TABLET DAILY 90 tablet 3   • tamsulosin (FLOMAX) 0.4 MG capsule 24 hr capsule TAKE 1 CAPSULE AT BEDTIME 90 capsule 1   • vitamin B-12 (CYANOCOBALAMIN) 100 MCG tablet Take 50 mcg by mouth Daily.     • vitamin B-6 (PYRIDOXINE) 50 MG tablet Take 50 mg by mouth Daily.     • warfarin (COUMADIN) 3 MG tablet Take 3 mg by mouth Daily.       No facility-administered medications prior to visit.       No opioid medication identified on active medication list. I have reviewed chart for other potential  high risk medication/s and harmful drug interactions in the elderly.          Aspirin is on active medication list. Aspirin use is indicated based on review of current medical condition/s. Pros and cons of this therapy have been discussed today. Benefits of this medication outweigh potential harm.  Patient has been encouraged to continue taking this medication.  .      Patient Active Problem List   Diagnosis   • Acquired hypothyroidism   • Chronic atrial fibrillation (HCC)   • Degenerative disc disease,  "lumbar   • Essential hypertension   • Mixed hyperlipidemia   • Type 2 diabetes mellitus with complication, without long-term current use of insulin (HCC)   • Positive colorectal cancer screening using Cologuard test   • Chronic renal failure, stage 3 (moderate) (HCC)   • Cardiomyopathy (HCC)   • Ventricular tachycardia (HCC)   • Chronic systolic congestive heart failure (HCC)   • Screen for colon cancer     Advance Care Planning  Advance Directive is not on file.  ACP discussion was held with the patient during this visit. Patient has an advance directive (not in EMR), copy requested.          Objective    Vitals:    12/20/21 1259   BP: 106/60   Pulse: 70   SpO2: 96%   Weight: 72.3 kg (159 lb 8 oz)   Height: 170.2 cm (67\")   PainSc: 0-No pain     BMI Readings from Last 1 Encounters:   12/20/21 24.98 kg/m²   BMI is within normal parameters. No follow-up required.    Does the patient have evidence of cognitive impairment? No    Physical Exam  Vitals and nursing note reviewed.   Constitutional:       General: He is not in acute distress.     Appearance: He is well-developed.   HENT:      Head: Normocephalic.      Nose: Nose normal.   Eyes:      General:         Right eye: No discharge.         Left eye: No discharge.      Conjunctiva/sclera: Conjunctivae normal.      Pupils: Pupils are equal, round, and reactive to light.   Neck:      Thyroid: No thyromegaly.   Cardiovascular:      Rate and Rhythm: Normal rate and regular rhythm.      Heart sounds: Normal heart sounds. No murmur heard.      Pulmonary:      Effort: Pulmonary effort is normal.      Breath sounds: Normal breath sounds.   Lymphadenopathy:      Cervical: No cervical adenopathy.   Skin:     General: Skin is warm and dry.   Neurological:      Mental Status: He is alert and oriented to person, place, and time.      Comments: MMSE 28/30       Lab Results   Component Value Date    TRIG 72 12/17/2021    HDL 48 12/17/2021    LDL 73 12/17/2021    VLDL 14 12/17/2021 "    HGBA1C 6.47 (H) 12/17/2021            HEALTH RISK ASSESSMENT    Smoking Status:  Social History     Tobacco Use   Smoking Status Never Smoker   Smokeless Tobacco Never Used     Alcohol Consumption:  Social History     Substance and Sexual Activity   Alcohol Use No    Comment: 50 yrs ago     Fall Risk Screen:    TORIBIO Fall Risk Assessment was completed, and patient is at HIGH risk for falls. Assessment completed on:12/20/2021    Depression Screening:  PHQ-2/PHQ-9 Depression Screening 12/20/2021   Little interest or pleasure in doing things 2   Feeling down, depressed, or hopeless 2   Trouble falling or staying asleep, or sleeping too much 1   Feeling tired or having little energy 3   Poor appetite or overeating 0   Feeling bad about yourself - or that you are a failure or have let yourself or your family down 0   Trouble concentrating on things, such as reading the newspaper or watching television 1   Moving or speaking so slowly that other people could have noticed. Or the opposite - being so fidgety or restless that you have been moving around a lot more than usual 1   Thoughts that you would be better off dead, or of hurting yourself in some way 0   Total Score 10   If you checked off any problems, how difficult have these problems made it for you to do your work, take care of things at home, or get along with other people? Extremely dIfficult       Health Habits and Functional and Cognitive Screening:  Functional & Cognitive Status 12/20/2021   Do you have difficulty preparing food and eating? Yes   Do you have difficulty bathing yourself, getting dressed or grooming yourself? No   Do you have difficulty using the toilet? No   Do you have difficulty moving around from place to place? No   Do you have trouble with steps or getting out of a bed or a chair? Yes   Current Diet Well Balanced Diet   Dental Exam Not up to date   Eye Exam Up to date   Exercise (times per week) 0 times per week   Current Exercises  Include No Regular Exercise   Current Exercise Activities Include -   Do you need help using the phone?  Yes   Are you deaf or do you have serious difficulty hearing?  Yes   Do you need help with transportation? Yes   Do you need help shopping? Yes   Do you need help preparing meals?  Yes   Do you need help with housework?  Yes   Do you need help with laundry? No   Do you need help taking your medications? Yes   Do you need help managing money? Yes   Do you ever drive or ride in a car without wearing a seat belt? No   Have you felt unusual stress, anger or loneliness in the last month? No   Who do you live with? Alone   If you need help, do you have trouble finding someone available to you? No   Have you been bothered in the last four weeks by sexual problems? No   Do you have difficulty concentrating, remembering or making decisions? Yes       Age-appropriate Screening Schedule:  Refer to the list below for future screening recommendations based on patient's age, sex and/or medical conditions. Orders for these recommended tests are listed in the plan section. The patient has been provided with a written plan.    Health Maintenance   Topic Date Due   • ZOSTER VACCINE (2 of 2) 08/01/2017   • DIABETIC EYE EXAM  10/05/2018   • HEMOGLOBIN A1C  06/17/2022   • LIPID PANEL  12/17/2022   • URINE MICROALBUMIN  12/17/2022   • TDAP/TD VACCINES (2 - Td or Tdap) 06/06/2027   • INFLUENZA VACCINE  Completed              Assessment/Plan   CMS Preventative Services Quick Reference  Risk Factors Identified During Encounter  Depression/Dysphoria  Fall Risk-High or Moderate  Immunizations Discussed/Encouraged (specific Immunizations; Influenza and COVID19  The above risks/problems have been discussed with the patient.  Follow up actions/plans if indicated are seen below in the Assessment/Plan Section.  Pertinent information has been shared with the patient in the After Visit Summary.    Diagnoses and all orders for this visit:    1.  Medicare annual wellness visit, subsequent (Primary)    2. Mixed hyperlipidemia  -     CBC & Differential; Future  -     Comprehensive Metabolic Panel; Future  -     LDL Cholesterol, Direct; Future  -     Hemoglobin A1c; Future    3. Essential hypertension  -     CBC & Differential; Future  -     Comprehensive Metabolic Panel; Future  -     LDL Cholesterol, Direct; Future  -     Hemoglobin A1c; Future    4. Chronic atrial fibrillation (HCC)    5. Type 2 diabetes mellitus with complication, without long-term current use of insulin (HCC)  -     CBC & Differential; Future  -     Comprehensive Metabolic Panel; Future  -     LDL Cholesterol, Direct; Future  -     Hemoglobin A1c; Future    6. Need for immunization against influenza  -     Fluzone High-Dose 65+yrs ([id number])        Follow Up:   Return in about 6 months (around 6/20/2022).     An After Visit Summary and PPPS were made available to the patient.  Information has been scanned into chart. Discussed importance of taking medications as prescribed. Encouraged healthy eating habits with low fat, low salt choices and working towards maintaining a healthy weight. Recommended regular exercise if able as well as care to prevent falls including avoiding anything on the floor that they could slip or trip on such as throw rugs, making sure they have a bathmat to step onto when their feet are wet and having grab bars and railings where needed.

## 2021-12-20 NOTE — PATIENT INSTRUCTIONS
Get covid booster    Check at pharmacy on Shingrix shingles vaccine     Medicare Wellness  Personal Prevention Plan of Service     Date of Office Visit:  2021  Encounter Provider:  Jade Barnett MD  Place of Service:  [facility]  Patient Name: Laci Pruett  :  1940    As part of the Medicare Wellness portion of your visit today, we are providing you with this personalized preventive plan of services (PPPS). This plan is based upon recommendations of the [place] Preventive Services Task Force (USPSTF) and the Advisory Committee on Immunization Practices (ACIP).    This lists the preventive care services that should be considered, and provides dates of when you are due. Items listed as completed are up-to-date and do not require any further intervention.    Health Maintenance   Topic Date Due   • ZOSTER VACCINE (2 of 2) 2017   • DIABETIC EYE EXAM  10/05/2018   • INFLUENZA VACCINE  2021   • COVID-19 Vaccine (3 - Booster for Pfizer series) 10/26/2021   • COLORECTAL CANCER SCREENING  2021   • ANNUAL WELLNESS VISIT  12/15/2021   • HEMOGLOBIN A1C  2022   • LIPID PANEL  2022   • URINE MICROALBUMIN  2022   • TDAP/TD VACCINES (2 - Td or Tdap) 2027   • HEPATITIS C SCREENING  Completed   • Pneumococcal Vaccine 65+  Completed       No orders of the defined types were placed in this encounter.      Return in about 6 months (around 2022).

## 2022-01-01 ENCOUNTER — TRANSCRIBE ORDERS (OUTPATIENT)
Dept: LAB | Facility: OTHER | Age: 82
End: 2022-01-01

## 2022-01-01 ENCOUNTER — LAB (OUTPATIENT)
Dept: LAB | Facility: OTHER | Age: 82
End: 2022-01-01

## 2022-01-01 ENCOUNTER — TELEPHONE (OUTPATIENT)
Dept: FAMILY MEDICINE CLINIC | Facility: CLINIC | Age: 82
End: 2022-01-01

## 2022-01-01 ENCOUNTER — HOSPITAL ENCOUNTER (OUTPATIENT)
Facility: HOSPITAL | Age: 82
Setting detail: HOSPITAL OUTPATIENT SURGERY
Discharge: HOME | End: 2022-01-17
Attending: SURGERY | Admitting: SURGERY

## 2022-01-01 ENCOUNTER — ANESTHESIA (OUTPATIENT)
Dept: GASTROENTEROLOGY | Facility: HOSPITAL | Age: 82
End: 2022-01-01

## 2022-01-01 ENCOUNTER — ANESTHESIA EVENT (OUTPATIENT)
Dept: GASTROENTEROLOGY | Facility: HOSPITAL | Age: 82
End: 2022-01-01

## 2022-01-01 VITALS
DIASTOLIC BLOOD PRESSURE: 54 MMHG | HEIGHT: 67 IN | OXYGEN SATURATION: 94 % | BODY MASS INDEX: 24.6 KG/M2 | SYSTOLIC BLOOD PRESSURE: 100 MMHG | RESPIRATION RATE: 20 BRPM | TEMPERATURE: 97.6 F | HEART RATE: 70 BPM | WEIGHT: 156.75 LBS

## 2022-01-01 DIAGNOSIS — I48.20 CHRONIC ATRIAL FIBRILLATION: ICD-10-CM

## 2022-01-01 DIAGNOSIS — E03.9 ACQUIRED HYPOTHYROIDISM: Chronic | ICD-10-CM

## 2022-01-01 DIAGNOSIS — I10 ESSENTIAL HYPERTENSION: Chronic | ICD-10-CM

## 2022-01-01 DIAGNOSIS — N18.32 STAGE 3B CHRONIC KIDNEY DISEASE: Primary | ICD-10-CM

## 2022-01-01 DIAGNOSIS — N18.32 STAGE 3B CHRONIC KIDNEY DISEASE: ICD-10-CM

## 2022-01-01 DIAGNOSIS — Z12.11 SCREEN FOR COLON CANCER: ICD-10-CM

## 2022-01-01 LAB
ALBUMIN SERPL-MCNC: 4.3 G/DL (ref 3.5–5)
ANION GAP SERPL CALCULATED.3IONS-SCNC: 6 MMOL/L (ref 5–15)
BUN SERPL-MCNC: 40 MG/DL (ref 7–23)
BUN/CREAT SERPL: 24.4 (ref 7–25)
CALCIUM SPEC-SCNC: 9.1 MG/DL (ref 8.4–10.2)
CHLORIDE SERPL-SCNC: 101 MMOL/L (ref 101–112)
CO2 SERPL-SCNC: 33 MMOL/L (ref 22–30)
CREAT SERPL-MCNC: 1.64 MG/DL (ref 0.7–1.3)
EGFRCR SERPLBLD CKD-EPI 2021: 42 ML/MIN/1.73
GLUCOSE BLDC GLUCOMTR-MCNC: 69 MG/DL (ref 70–130)
GLUCOSE SERPL-MCNC: 123 MG/DL (ref 70–99)
PHOSPHATE SERPL-MCNC: 3.8 MG/DL (ref 2.5–4.5)
POTASSIUM SERPL-SCNC: 5.5 MMOL/L (ref 3.4–5)
SODIUM SERPL-SCNC: 140 MMOL/L (ref 137–145)

## 2022-01-01 PROCEDURE — 82962 GLUCOSE BLOOD TEST: CPT

## 2022-01-01 PROCEDURE — 25010000002 PROPOFOL 10 MG/ML EMULSION: Performed by: NURSE ANESTHETIST, CERTIFIED REGISTERED

## 2022-01-01 PROCEDURE — 36415 COLL VENOUS BLD VENIPUNCTURE: CPT | Performed by: INTERNAL MEDICINE

## 2022-01-01 PROCEDURE — 80069 RENAL FUNCTION PANEL: CPT | Performed by: INTERNAL MEDICINE

## 2022-01-01 RX ORDER — LEVOTHYROXINE SODIUM 0.05 MG/1
50 TABLET ORAL DAILY
Qty: 90 TABLET | Refills: 3 | Status: SHIPPED | OUTPATIENT
Start: 2022-01-01

## 2022-01-01 RX ORDER — EMPAGLIFLOZIN AND METFORMIN HYDROCHLORIDE 5; 1000 MG/1; MG/1
1 TABLET ORAL 2 TIMES DAILY
Qty: 60 TABLET | Refills: 0
Start: 2022-01-01

## 2022-01-01 RX ORDER — MELATONIN 10 MG
1 CAPSULE ORAL NIGHTLY
Qty: 30 CAPSULE | Refills: 0
Start: 2022-01-01

## 2022-01-01 RX ORDER — TAMSULOSIN HYDROCHLORIDE 0.4 MG/1
1 CAPSULE ORAL
Qty: 90 CAPSULE | Refills: 1
Start: 2022-01-01

## 2022-01-01 RX ORDER — LEVOTHYROXINE SODIUM 0.05 MG/1
50 TABLET ORAL DAILY
Qty: 90 TABLET | Refills: 3
Start: 2022-01-01 | End: 2022-01-01 | Stop reason: SDUPTHER

## 2022-01-01 RX ORDER — DIGOXIN 125 MCG
125 TABLET ORAL DAILY
Qty: 90 TABLET | Refills: 0
Start: 2022-01-01

## 2022-01-01 RX ORDER — TAMSULOSIN HYDROCHLORIDE 0.4 MG/1
1 CAPSULE ORAL
Qty: 90 CAPSULE | Refills: 1
Start: 2022-01-01 | End: 2022-01-01 | Stop reason: SDUPTHER

## 2022-01-01 RX ORDER — PRAVASTATIN SODIUM 40 MG
40 TABLET ORAL NIGHTLY
Qty: 90 TABLET | Refills: 3
Start: 2022-01-01

## 2022-01-01 RX ORDER — DEXTROSE AND SODIUM CHLORIDE 5; .45 G/100ML; G/100ML
100 INJECTION, SOLUTION INTRAVENOUS CONTINUOUS PRN
Status: DISCONTINUED | OUTPATIENT
Start: 2022-01-01 | End: 2022-01-01 | Stop reason: HOSPADM

## 2022-01-01 RX ORDER — WARFARIN SODIUM 3 MG/1
TABLET ORAL
Qty: 94 TABLET | Refills: 3
Start: 2022-01-01

## 2022-01-01 RX ORDER — DIPHENHYDRAMINE HCL 25 MG
25 TABLET ORAL EVERY 6 HOURS PRN
Qty: 30 TABLET | Refills: 0
Start: 2022-01-01

## 2022-01-01 RX ORDER — PROPOFOL 10 MG/ML
VIAL (ML) INTRAVENOUS AS NEEDED
Status: DISCONTINUED | OUTPATIENT
Start: 2022-01-01 | End: 2022-01-01 | Stop reason: SURG

## 2022-01-01 RX ORDER — OMEPRAZOLE 20 MG/1
20 CAPSULE, DELAYED RELEASE ORAL DAILY
Qty: 90 CAPSULE | Refills: 1 | Status: SHIPPED | OUTPATIENT
Start: 2022-01-01

## 2022-01-01 RX ORDER — LIDOCAINE HYDROCHLORIDE 20 MG/ML
INJECTION, SOLUTION INTRAVENOUS AS NEEDED
Status: DISCONTINUED | OUTPATIENT
Start: 2022-01-01 | End: 2022-01-01 | Stop reason: SURG

## 2022-01-01 RX ORDER — BUMETANIDE 1 MG/1
1 TABLET ORAL DAILY
Qty: 90 TABLET | Refills: 0
Start: 2022-01-01

## 2022-01-01 RX ORDER — CARVEDILOL 6.25 MG/1
6.25 TABLET ORAL 2 TIMES DAILY WITH MEALS
Qty: 180 TABLET | Refills: 3 | Status: SHIPPED | OUTPATIENT
Start: 2022-01-01

## 2022-01-01 RX ADMIN — PROPOFOL 20 MG: 10 INJECTION, EMULSION INTRAVENOUS at 09:23

## 2022-01-01 RX ADMIN — PROPOFOL 20 MG: 10 INJECTION, EMULSION INTRAVENOUS at 09:14

## 2022-01-01 RX ADMIN — LIDOCAINE HYDROCHLORIDE 50 MG: 20 INJECTION, SOLUTION INTRAVENOUS at 09:08

## 2022-01-01 RX ADMIN — PROPOFOL 50 MG: 10 INJECTION, EMULSION INTRAVENOUS at 09:08

## 2022-01-01 RX ADMIN — PROPOFOL 20 MG: 10 INJECTION, EMULSION INTRAVENOUS at 09:19

## 2022-01-01 RX ADMIN — PROPOFOL 10 MG: 10 INJECTION, EMULSION INTRAVENOUS at 09:26

## 2022-01-01 RX ADMIN — PROPOFOL 20 MG: 10 INJECTION, EMULSION INTRAVENOUS at 09:16

## 2022-01-01 RX ADMIN — DEXTROSE AND SODIUM CHLORIDE 100 ML/HR: 5; 450 INJECTION, SOLUTION INTRAVENOUS at 08:46

## 2022-01-01 RX ADMIN — PROPOFOL 20 MG: 10 INJECTION, EMULSION INTRAVENOUS at 09:11

## 2022-01-03 NOTE — TELEPHONE ENCOUNTER
Pt wanted to see if you could order him an oxygen machine that makes its own oxygen. He didn't know where you get this from. [phone].

## 2022-01-03 NOTE — TELEPHONE ENCOUNTER
Spoke with pt he's not sure what the name of the machine is that he got from a friend to use at night while sleeping. Maybe a cpap not sure, he will talk to his friend to get more information. Its not a portable o2 concentrator, he just needs something to help him breathe at night.

## 2022-01-17 NOTE — ANESTHESIA PREPROCEDURE EVALUATION
Anesthesia Evaluation     NPO Solid Status: > 8 hours  NPO Liquid Status: > 4 hours           Airway   Mallampati: II  TM distance: >3 FB  Neck ROM: limited  No difficulty expected  Dental    (+) lower dentures and upper dentures    Pulmonary     breath sounds clear to auscultation  Cardiovascular   Exercise tolerance: poor (<4 METS)    Rhythm: irregular  Rate: normal    (+) hypertension 2 medications or greater, dysrhythmias Atrial Fib, hyperlipidemia,       Neuro/Psych  GI/Hepatic/Renal/Endo    (+)   diabetes mellitus type 2,     Musculoskeletal     Abdominal    Substance History      OB/GYN          Other   arthritis,                        Anesthesia Plan    ASA 3     MAC       Anesthetic plan, all risks, benefits, and alternatives have been provided, discussed and informed consent has been obtained with: patient.    Plan discussed with CRNA.

## 2022-01-17 NOTE — H&P
No chief complaint on file.      Laci Pruett is a 81 y.o. male referred today for evaluation for colonoscopy.  He notes no change in bowel habits, no blood in the stool.     Positive cologuard    Prior Colonoscopy:yes  Prior Polyps:no  Family History of Colon Cancer:no  On anticoagulation:yes    Past Surgical History:   Procedure Laterality Date   • CARDIAC CATHETERIZATION  01/21/2016    Coronary arteries are essentially normal.Cardiomyopathy. EF of 25%.RV pacing noted.Moderate to severe 3+ mitral regurg noted.Hemodynamic data as mentioned.Moderate pulmonary hypertension with CHF.   • COLONOSCOPY N/A 11/16/2018    Procedure: COLONOSCOPY;  Surgeon: Martínez Guillaume MD;  Location: [facility] ENDOSCOPY;  Service: General   • OTHER SURGICAL HISTORY  06/28/2008    Colonoscopy remove polyps ,diverticula, roids   • OTHER SURGICAL HISTORY  03/15/2012    I&D, Simple    • OTHER SURGICAL HISTORY  06/11/2008    Vision screen ,DIABETIC EYE EXAM   • OTHER SURGICAL HISTORY      Pacemaker AICD pocket    • VASECTOMY       Past Medical History:   Diagnosis Date   • Acquired hypothyroidism    • Chronic atrial fibrillation (HCC)    • Degeneration of intervertebral disc between fourth and fifth lumbar vertebrae    • Essential hypertension    • Hyperlipidemia    • Need for prophylactic vaccination against Streptococcus pneumoniae (pneumococcus)    • Type 2 diabetes mellitus (HCC)     Type 2 diabetes mellitus - no retinopathy        Social History     Socioeconomic History   • Marital status:    Tobacco Use   • Smoking status: Never Smoker   • Smokeless tobacco: Never Used   Vaping Use   • Vaping Use: Never used   Substance and Sexual Activity   • Alcohol use: No     Comment: 50 yrs ago   • Drug use: Never     Family History   Problem Relation Age of Onset   • Breast cancer Sister    • Arthritis Sister    • Cancer Sister    • Heart disease Sister    • Coronary artery disease Other    • Diabetes Other    • Hypertension Other    •  Arthritis Mother    • Diabetes Mother    • Heart disease Mother    • Liver disease Mother    • Hypertension Mother    • Arthritis Father    • Diabetes Father    • Heart disease Father    • Hypertension Father    • Kidney disease Father    • Arthritis Brother    • Diabetes Brother    • Heart disease Brother      No Known Allergies    Home Medications:  Prior to Admission medications    Medication Sig Start Date End Date Taking? Authorizing Provider   carvedilol (COREG) 6.25 MG tablet Take 1 tablet by mouth 2 (Two) Times a Day With Meals. 4/15/21  Yes Jade Barnett MD   cholecalciferol (VITAMIN D3) 1000 units tablet Take 1,000 Units by mouth Daily.   Yes Dianna Henry MD   DIGOXIN PO Take  by mouth Daily.   Yes Dianna Henry MD   docusate sodium (COLACE) 50 MG capsule Take 1 capsule by mouth.   Yes Dianna Henry MD   folic acid (FOLVITE) 800 MCG tablet Take 1 tablet by mouth Daily. 8/29/18  Yes Jade Barnett MD   furosemide (LASIX) 40 MG tablet Take 1 tablet by mouth Daily As Needed (for weight gain or swelling).  Patient taking differently: Take 40 mg by mouth Daily. 1/24/19  Yes Jade Barnett MD   glipizide (GLUCOTROL) 5 MG tablet TAKE 1 TABLET TWICE DAILY  BEFORE MEALS 9/2/20  Yes Jade Barnett MD   Jantoven 3 MG tablet TAKE 1 TABLET DAILY EXCEPT 2 TABLETS ON WEDNESDAY 1/4/21  Yes Jade Barnett MD   MAGNESIUM PO Take  by mouth Daily.   Yes Dianna Henry MD   Multiple Vitamin (VITAMIN E/FOLIC ACID/B-6/B-12) capsule Take  by mouth.   Yes Dianna Henry MD   mupirocin (Bactroban) 2 % cream Apply  topically to the appropriate area as directed 2 (Two) Times a Day. 4/2/21  Yes Jade Barnett MD   omeprazole (priLOSEC) 20 MG capsule TAKE 1 CAPSULE DAILY 2/2/21  Yes Jade Barnett MD   POTASSIUM PO Take  by mouth Daily.   Yes Dianna Henry MD   pravastatin (PRAVACHOL) 40 MG tablet TAKE 1 TABLET EVERY NIGHT 6/7/21  Yes Jade Barnett MD    sacubitril-valsartan (Entresto) 49-51 MG tablet Take 1 tablet by mouth 2 (Two) Times a Day. 12/11/20  Yes Jade Barnett MD   SITagliptin (Januvia) 50 MG tablet Take 1 tablet by mouth Daily. 5/11/20  Yes Jade Barnett MD   spironolactone (ALDACTONE) 25 MG tablet TAKE 1/2 TABLET THREE TIMESWEEKLY 3/11/21  Yes Jade Barnett MD   Synthroid 50 MCG tablet TAKE 1 TABLET DAILY 2/22/21  Yes Jade Barnett MD   tamsulosin (FLOMAX) 0.4 MG capsule 24 hr capsule TAKE 1 CAPSULE AT BEDTIME 9/29/21  Yes Jade Barnett MD   vitamin B-12 (CYANOCOBALAMIN) 100 MCG tablet Take 50 mcg by mouth Daily.   Yes ProviderDianna MD   vitamin B-6 (PYRIDOXINE) 50 MG tablet Take 50 mg by mouth Daily.   Yes ProviderDianna MD   aspirin 81 MG tablet Take 1 tablet by mouth Daily. 9/28/18   Jade Barnett MD   calcitriol (ROCALTROL) 0.25 MCG capsule TAKE 1 CAPSULE MONDAY,     WEDNESDAY, FRIDAY 6/28/21   Jade Barnett MD   nitroglycerin (NITROSTAT) 0.4 MG SL tablet Place 1 tablet under the tongue Every 5 (Five) Minutes As Needed for Chest Pain. Take no more than 3 doses in 15 minutes. 1/23/20   Jade Barnett MD   warfarin (COUMADIN) 3 MG tablet Take 3 mg by mouth Daily.    Provider, MD Dianna       Review of Systems   Constitutional: Negative.    HENT: Negative for hearing loss, nosebleeds and trouble swallowing.    Respiratory: Positive for shortness of breath. Negative for apnea and chest tightness.    Cardiovascular: Negative for chest pain and palpitations.   Gastrointestinal: Negative for abdominal distention, abdominal pain, blood in stool, constipation, diarrhea, nausea and vomiting.   Genitourinary: Negative for difficulty urinating, dysuria, frequency and urgency.   Musculoskeletal: Negative for back pain, joint swelling and neck pain.   Skin: Negative for rash.   Neurological: Negative for dizziness, seizures, weakness, light-headedness, numbness and headaches.   Hematological: Negative  for adenopathy.   Psychiatric/Behavioral: Negative for agitation. The patient is not nervous/anxious.    chronic sob, no recent changes    Vitals:    01/17/22 0840   BP: 114/66   Pulse: 90   Resp: 20   Temp: 97 °F (36.1 °C)   SpO2: 95%       Physical Exam  Constitutional:       General: He is not in acute distress.     Appearance: He is well-developed.   HENT:      Head: Normocephalic and atraumatic.   Eyes:      General: No scleral icterus.     Conjunctiva/sclera: Conjunctivae normal.   Neck:      Thyroid: No thyromegaly.      Trachea: No tracheal deviation.   Cardiovascular:      Rate and Rhythm: Normal rate and regular rhythm.      Heart sounds: Normal heart sounds. No murmur heard.  No friction rub. No gallop.    Pulmonary:      Effort: Pulmonary effort is normal. No respiratory distress.      Breath sounds: Normal breath sounds. No stridor. No wheezing or rales.   Chest:      Chest wall: No tenderness.   Abdominal:      General: Bowel sounds are normal. There is no distension.      Palpations: Abdomen is soft. There is no mass.      Tenderness: There is no abdominal tenderness. There is no guarding or rebound.      Hernia: No hernia is present.   Musculoskeletal:         General: No deformity. Normal range of motion.      Cervical back: Normal range of motion and neck supple.   Lymphadenopathy:      Cervical: No cervical adenopathy.   Skin:     General: Skin is warm and dry.      Coloration: Skin is not pale.      Findings: No erythema or rash.      Nails: There is no clubbing.   Neurological:      Mental Status: He is alert and oriented to person, place, and time.   Psychiatric:         Behavior: Behavior normal.         Thought Content: Thought content normal.         Assessment     In need of screening colonoscopy.    Plan     Risks, benefits, rationale and prep for colonoscopy have been discussed with the patient.  The patient indicates understanding of these issues and agrees with the  plan.

## 2022-01-17 NOTE — ANESTHESIA POSTPROCEDURE EVALUATION
Patient: Laci Pruett    Procedure Summary     Date: 01/17/22 Room / Location: [facility] ENDOSCOPY 2 / [facility] ENDOSCOPY    Anesthesia Start: 0856 Anesthesia Stop: 0942    Procedure: COLONOSCOPY (N/A ) Diagnosis:       Screen for colon cancer      (Screen for colon cancer [Z12.11])    Surgeons: Martínez Guillaume MD Provider: Anika Hernandez CRNA    Anesthesia Type: MAC ASA Status: 3          Anesthesia Type: MAC    Vitals  No vitals data found for the desired time range.          Post Anesthesia Care and Evaluation    Patient location during evaluation: PHASE II  Patient participation: complete - patient participated  Level of consciousness: awake and alert  Pain score: 0  Pain management: adequate  Airway patency: patent  Anesthetic complications: No anesthetic complications  PONV Status: none  Cardiovascular status: acceptable  Respiratory status: acceptable  Hydration status: acceptable

## 2022-03-22 NOTE — TELEPHONE ENCOUNTER
Incoming Refill Request      Medication requested (name and dose): levothyroxine (Synthroid) 50 MCG tablet    Pharmacy where request should be sent: [facility] REF # [id number]    Additional details provided by patient:  Best call back number: [phone]    Does the patient have less than a 3 day supply:  [] Yes  [x] No    Bonnie Mike Rep  03/22/22, 11:23 CDT

## 2022-03-28 NOTE — TELEPHONE ENCOUNTER
Patients daughter called stating that Mr. Pruett received an letter stating that his Tamsulosin(Flomax) .04 MG was denied by his doctor. They was wanting to know what they needed to do.    She requested a call back @ [phone].

## 2022-03-29 NOTE — TELEPHONE ENCOUNTER
Incoming Refill Request      Medication requested (name and dose): tamsulosin (FLOMAX) 0.4 MG capsule 24 hr capsule    Pharmacy where request should be sent: [facility], [place]     Additional details provided by patient: NONE    Best call back number: [phone]    Does the patient have less than a 3 day supply:  [x] Yes  [] No    Bonnie Mike Rep  03/29/22, 14:48 CDT

## 2022-04-05 NOTE — TELEPHONE ENCOUNTER
[facility] in [place] called in reference to a missing prescription Tamsulosin (Flomax)0.4 MG 24 hr capsule. Caller stated it has been sent twice and they have not received it. Caller stated that if  would want to send it verbally the number is [phone] and dial [phone] when the recording starts. That will go through to the Pharmacists.    right

## 2025-04-09 NOTE — PROGRESS NOTES
Problem: Adult Inpatient Plan of Care  Goal: Plan of Care Review  Outcome: Progressing  Flowsheets (Taken 4/9/2025 6065)  Progress: no change  Outcome Evaluation: VSS. pt A&Ox4. pt on 2L NC. prn pain medication given for pain. sinus/SB 49-72 PAC on tele. colostomy bag changed during this shift. safety maintained.  Plan of Care Reviewed With: patient                                 Card labs stable

## (undated) DEVICE — TRAP SXN POLYP QUICKCATCH LF

## (undated) DEVICE — CANN SMPL SOFTECH BIFLO ETCO2 A/M 7FT

## (undated) DEVICE — Device: Brand: DISPOSABLE ELECTROSURGICAL SNARE